# Patient Record
Sex: FEMALE | Race: WHITE | NOT HISPANIC OR LATINO | URBAN - METROPOLITAN AREA
[De-identification: names, ages, dates, MRNs, and addresses within clinical notes are randomized per-mention and may not be internally consistent; named-entity substitution may affect disease eponyms.]

---

## 2017-09-13 ENCOUNTER — OFFICE (OUTPATIENT)
Dept: URBAN - METROPOLITAN AREA CLINIC 75 | Facility: CLINIC | Age: 62
End: 2017-09-13

## 2017-09-13 VITALS
HEIGHT: 72 IN | DIASTOLIC BLOOD PRESSURE: 70 MMHG | WEIGHT: 189 LBS | SYSTOLIC BLOOD PRESSURE: 136 MMHG | HEART RATE: 82 BPM

## 2017-09-13 DIAGNOSIS — K74.60 UNSPECIFIED CIRRHOSIS OF LIVER: ICD-10-CM

## 2017-09-13 PROCEDURE — 99244 OFF/OP CNSLTJ NEW/EST MOD 40: CPT | Performed by: INTERNAL MEDICINE

## 2017-10-30 VITALS
RESPIRATION RATE: 26 BRPM | TEMPERATURE: 97.7 F | HEART RATE: 74 BPM | DIASTOLIC BLOOD PRESSURE: 97 MMHG | HEIGHT: 72 IN | HEART RATE: 89 BPM | RESPIRATION RATE: 16 BRPM | OXYGEN SATURATION: 100 % | WEIGHT: 187 LBS | TEMPERATURE: 98.5 F | SYSTOLIC BLOOD PRESSURE: 137 MMHG | OXYGEN SATURATION: 93 % | SYSTOLIC BLOOD PRESSURE: 151 MMHG | HEART RATE: 68 BPM | HEART RATE: 77 BPM | DIASTOLIC BLOOD PRESSURE: 74 MMHG | HEART RATE: 80 BPM | OXYGEN SATURATION: 96 % | SYSTOLIC BLOOD PRESSURE: 131 MMHG | SYSTOLIC BLOOD PRESSURE: 140 MMHG | SYSTOLIC BLOOD PRESSURE: 133 MMHG | DIASTOLIC BLOOD PRESSURE: 78 MMHG | RESPIRATION RATE: 19 BRPM | OXYGEN SATURATION: 80 % | DIASTOLIC BLOOD PRESSURE: 76 MMHG | DIASTOLIC BLOOD PRESSURE: 75 MMHG | HEART RATE: 69 BPM

## 2017-10-31 ENCOUNTER — OFFICE (OUTPATIENT)
Dept: URBAN - METROPOLITAN AREA CLINIC 64 | Facility: CLINIC | Age: 62
End: 2017-10-31

## 2017-10-31 ENCOUNTER — AMBULATORY SURGICAL CENTER (OUTPATIENT)
Dept: URBAN - METROPOLITAN AREA SURGERY 17 | Facility: SURGERY | Age: 62
End: 2017-10-31

## 2017-10-31 DIAGNOSIS — K29.70 GASTRITIS, UNSPECIFIED, WITHOUT BLEEDING: ICD-10-CM

## 2017-10-31 DIAGNOSIS — K74.60 UNSPECIFIED CIRRHOSIS OF LIVER: ICD-10-CM

## 2017-10-31 DIAGNOSIS — I85.00 ESOPHAGEAL VARICES WITHOUT BLEEDING: ICD-10-CM

## 2017-10-31 DIAGNOSIS — K29.50 UNSPECIFIED CHRONIC GASTRITIS WITHOUT BLEEDING: ICD-10-CM

## 2017-10-31 LAB
GI HISTOLOGY: A. UNSPECIFIED: (no result)
GI HISTOLOGY: PDF REPORT: (no result)

## 2017-10-31 PROCEDURE — 88305 TISSUE EXAM BY PATHOLOGIST: CPT | Performed by: INTERNAL MEDICINE

## 2017-10-31 PROCEDURE — 43239 EGD BIOPSY SINGLE/MULTIPLE: CPT | Performed by: INTERNAL MEDICINE

## 2017-10-31 RX ADMIN — PROPOFOL 50 MG: 10 INJECTION, EMULSION INTRAVENOUS at 14:14

## 2017-10-31 RX ADMIN — LIDOCAINE HYDROCHLORIDE 50 MG: 10 INJECTION, SOLUTION EPIDURAL; INFILTRATION; INTRACAUDAL; PERINEURAL at 14:10

## 2017-10-31 RX ADMIN — PROPOFOL 100 MG: 10 INJECTION, EMULSION INTRAVENOUS at 14:10

## 2017-10-31 RX ADMIN — PROPOFOL 50 MG: 10 INJECTION, EMULSION INTRAVENOUS at 14:12

## 2017-12-21 ENCOUNTER — OFFICE (OUTPATIENT)
Dept: URBAN - METROPOLITAN AREA CLINIC 75 | Facility: CLINIC | Age: 62
End: 2017-12-21

## 2017-12-21 VITALS
SYSTOLIC BLOOD PRESSURE: 130 MMHG | HEIGHT: 72 IN | DIASTOLIC BLOOD PRESSURE: 72 MMHG | WEIGHT: 192 LBS | HEART RATE: 95 BPM

## 2017-12-21 DIAGNOSIS — Z12.11 ENCOUNTER FOR SCREENING FOR MALIGNANT NEOPLASM OF COLON: ICD-10-CM

## 2017-12-21 DIAGNOSIS — K29.70 GASTRITIS, UNSPECIFIED, WITHOUT BLEEDING: ICD-10-CM

## 2017-12-21 DIAGNOSIS — K74.60 UNSPECIFIED CIRRHOSIS OF LIVER: ICD-10-CM

## 2017-12-21 DIAGNOSIS — I85.00 ESOPHAGEAL VARICES WITHOUT BLEEDING: ICD-10-CM

## 2017-12-21 PROCEDURE — 99214 OFFICE O/P EST MOD 30 MIN: CPT | Performed by: INTERNAL MEDICINE

## 2018-04-02 VITALS
HEART RATE: 67 BPM | RESPIRATION RATE: 16 BRPM | TEMPERATURE: 97.4 F | DIASTOLIC BLOOD PRESSURE: 88 MMHG | RESPIRATION RATE: 15 BRPM | DIASTOLIC BLOOD PRESSURE: 61 MMHG | OXYGEN SATURATION: 97 % | OXYGEN SATURATION: 98 % | DIASTOLIC BLOOD PRESSURE: 64 MMHG | HEART RATE: 74 BPM | RESPIRATION RATE: 29 BRPM | OXYGEN SATURATION: 95 % | OXYGEN SATURATION: 94 % | HEART RATE: 77 BPM | DIASTOLIC BLOOD PRESSURE: 86 MMHG | DIASTOLIC BLOOD PRESSURE: 68 MMHG | SYSTOLIC BLOOD PRESSURE: 134 MMHG | SYSTOLIC BLOOD PRESSURE: 139 MMHG | HEART RATE: 73 BPM | SYSTOLIC BLOOD PRESSURE: 124 MMHG | SYSTOLIC BLOOD PRESSURE: 142 MMHG | RESPIRATION RATE: 18 BRPM | SYSTOLIC BLOOD PRESSURE: 126 MMHG | DIASTOLIC BLOOD PRESSURE: 76 MMHG | SYSTOLIC BLOOD PRESSURE: 97 MMHG | HEART RATE: 78 BPM | TEMPERATURE: 97.8 F | RESPIRATION RATE: 22 BRPM | RESPIRATION RATE: 19 BRPM | DIASTOLIC BLOOD PRESSURE: 70 MMHG | RESPIRATION RATE: 17 BRPM | SYSTOLIC BLOOD PRESSURE: 140 MMHG | HEART RATE: 75 BPM | OXYGEN SATURATION: 93 % | DIASTOLIC BLOOD PRESSURE: 72 MMHG | RESPIRATION RATE: 34 BRPM | RESPIRATION RATE: 30 BRPM | HEIGHT: 72 IN | SYSTOLIC BLOOD PRESSURE: 137 MMHG | HEART RATE: 69 BPM | WEIGHT: 192 LBS | DIASTOLIC BLOOD PRESSURE: 67 MMHG | HEART RATE: 70 BPM

## 2018-04-03 ENCOUNTER — AMBULATORY SURGICAL CENTER (OUTPATIENT)
Dept: URBAN - METROPOLITAN AREA SURGERY 17 | Facility: SURGERY | Age: 63
End: 2018-04-03

## 2018-04-03 DIAGNOSIS — Z12.11 ENCOUNTER FOR SCREENING FOR MALIGNANT NEOPLASM OF COLON: ICD-10-CM

## 2018-04-03 DIAGNOSIS — K57.30 DIVERTICULOSIS OF LARGE INTESTINE WITHOUT PERFORATION OR ABS: ICD-10-CM

## 2018-04-03 DIAGNOSIS — K64.8 OTHER HEMORRHOIDS: ICD-10-CM

## 2018-04-03 PROCEDURE — 45378 DIAGNOSTIC COLONOSCOPY: CPT | Mod: 33 | Performed by: INTERNAL MEDICINE

## 2018-04-03 RX ADMIN — PROPOFOL 100 MG: 10 INJECTION, EMULSION INTRAVENOUS at 07:54

## 2018-04-03 RX ADMIN — PROPOFOL 25 MG: 10 INJECTION, EMULSION INTRAVENOUS at 07:58

## 2018-04-03 RX ADMIN — PROPOFOL 25 MG: 10 INJECTION, EMULSION INTRAVENOUS at 08:09

## 2018-04-03 RX ADMIN — LIDOCAINE HYDROCHLORIDE 25 MG: 10 INJECTION, SOLUTION EPIDURAL; INFILTRATION; INTRACAUDAL; PERINEURAL at 07:54

## 2018-04-03 RX ADMIN — PROPOFOL 25 MG: 10 INJECTION, EMULSION INTRAVENOUS at 08:05

## 2018-04-03 RX ADMIN — PROPOFOL 25 MG: 10 INJECTION, EMULSION INTRAVENOUS at 08:01

## 2018-04-03 RX ADMIN — PROPOFOL 25 MG: 10 INJECTION, EMULSION INTRAVENOUS at 08:03

## 2018-10-05 ENCOUNTER — OFFICE (OUTPATIENT)
Dept: URBAN - METROPOLITAN AREA CLINIC 75 | Facility: CLINIC | Age: 63
End: 2018-10-05

## 2018-10-05 VITALS
HEART RATE: 68 BPM | HEIGHT: 71 IN | DIASTOLIC BLOOD PRESSURE: 68 MMHG | SYSTOLIC BLOOD PRESSURE: 128 MMHG | WEIGHT: 188 LBS

## 2018-10-05 DIAGNOSIS — K74.60 UNSPECIFIED CIRRHOSIS OF LIVER: ICD-10-CM

## 2018-10-05 DIAGNOSIS — I85.00 ESOPHAGEAL VARICES WITHOUT BLEEDING: ICD-10-CM

## 2018-10-05 DIAGNOSIS — Z80.0 FAMILY HISTORY OF MALIGNANT NEOPLASM OF DIGESTIVE ORGANS: ICD-10-CM

## 2018-10-05 DIAGNOSIS — K58.9 IRRITABLE BOWEL SYNDROME WITHOUT DIARRHEA: ICD-10-CM

## 2018-10-05 PROCEDURE — 99214 OFFICE O/P EST MOD 30 MIN: CPT | Performed by: INTERNAL MEDICINE

## 2019-10-04 ENCOUNTER — OFFICE (OUTPATIENT)
Dept: URBAN - METROPOLITAN AREA CLINIC 75 | Facility: CLINIC | Age: 64
End: 2019-10-04

## 2019-10-04 VITALS
WEIGHT: 188 LBS | DIASTOLIC BLOOD PRESSURE: 82 MMHG | HEART RATE: 69 BPM | SYSTOLIC BLOOD PRESSURE: 122 MMHG | HEIGHT: 71 IN

## 2019-10-04 DIAGNOSIS — R15.2 FECAL URGENCY: ICD-10-CM

## 2019-10-04 DIAGNOSIS — K76.9 LIVER DISEASE, UNSPECIFIED: ICD-10-CM

## 2019-10-04 DIAGNOSIS — I85.00 ESOPHAGEAL VARICES WITHOUT BLEEDING: ICD-10-CM

## 2019-10-04 DIAGNOSIS — Q44.7 OTHER CONGENITAL MALFORMATIONS OF LIVER: ICD-10-CM

## 2019-10-04 PROCEDURE — 99214 OFFICE O/P EST MOD 30 MIN: CPT | Performed by: INTERNAL MEDICINE

## 2020-01-03 VITALS
DIASTOLIC BLOOD PRESSURE: 75 MMHG | HEART RATE: 77 BPM | SYSTOLIC BLOOD PRESSURE: 129 MMHG | HEART RATE: 74 BPM | HEART RATE: 64 BPM | HEART RATE: 68 BPM | SYSTOLIC BLOOD PRESSURE: 127 MMHG | OXYGEN SATURATION: 95 % | HEART RATE: 65 BPM | HEIGHT: 71 IN | HEART RATE: 83 BPM | DIASTOLIC BLOOD PRESSURE: 78 MMHG | SYSTOLIC BLOOD PRESSURE: 131 MMHG | RESPIRATION RATE: 20 BRPM | SYSTOLIC BLOOD PRESSURE: 166 MMHG | DIASTOLIC BLOOD PRESSURE: 80 MMHG | TEMPERATURE: 97.2 F | DIASTOLIC BLOOD PRESSURE: 82 MMHG | RESPIRATION RATE: 26 BRPM | OXYGEN SATURATION: 96 % | HEART RATE: 69 BPM | DIASTOLIC BLOOD PRESSURE: 77 MMHG | TEMPERATURE: 98 F | RESPIRATION RATE: 24 BRPM | SYSTOLIC BLOOD PRESSURE: 139 MMHG | DIASTOLIC BLOOD PRESSURE: 79 MMHG | OXYGEN SATURATION: 91 % | WEIGHT: 188 LBS | SYSTOLIC BLOOD PRESSURE: 134 MMHG | RESPIRATION RATE: 18 BRPM

## 2020-01-07 ENCOUNTER — OFFICE (OUTPATIENT)
Dept: URBAN - METROPOLITAN AREA PATHOLOGY 4 | Facility: PATHOLOGY | Age: 65
End: 2020-01-07

## 2020-01-07 ENCOUNTER — AMBULATORY SURGICAL CENTER (OUTPATIENT)
Dept: URBAN - METROPOLITAN AREA SURGERY 17 | Facility: SURGERY | Age: 65
End: 2020-01-07

## 2020-01-07 DIAGNOSIS — K74.60 UNSPECIFIED CIRRHOSIS OF LIVER: ICD-10-CM

## 2020-01-07 DIAGNOSIS — K31.89 OTHER DISEASES OF STOMACH AND DUODENUM: ICD-10-CM

## 2020-01-07 DIAGNOSIS — K29.70 GASTRITIS, UNSPECIFIED, WITHOUT BLEEDING: ICD-10-CM

## 2020-01-07 DIAGNOSIS — I85.00 ESOPHAGEAL VARICES WITHOUT BLEEDING: ICD-10-CM

## 2020-01-07 LAB
GI HISTOLOGY: A. UNSPECIFIED: (no result)
GI HISTOLOGY: PDF REPORT: (no result)

## 2020-01-07 PROCEDURE — 43239 EGD BIOPSY SINGLE/MULTIPLE: CPT | Performed by: INTERNAL MEDICINE

## 2020-01-07 PROCEDURE — 88305 TISSUE EXAM BY PATHOLOGIST: CPT | Performed by: INTERNAL MEDICINE

## 2020-07-27 ENCOUNTER — OFFICE (OUTPATIENT)
Dept: URBAN - METROPOLITAN AREA CLINIC 75 | Facility: CLINIC | Age: 65
End: 2020-07-27

## 2020-07-27 VITALS — TEMPERATURE: 97.8 F | HEIGHT: 71 IN | WEIGHT: 190 LBS

## 2020-07-27 DIAGNOSIS — I85.00 ESOPHAGEAL VARICES WITHOUT BLEEDING: ICD-10-CM

## 2020-07-27 DIAGNOSIS — K74.60 UNSPECIFIED CIRRHOSIS OF LIVER: ICD-10-CM

## 2020-07-27 PROCEDURE — 99214 OFFICE O/P EST MOD 30 MIN: CPT | Performed by: INTERNAL MEDICINE

## 2021-07-28 ENCOUNTER — INPATIENT HOSPITAL (OUTPATIENT)
Dept: URBAN - METROPOLITAN AREA HOSPITAL 107 | Facility: HOSPITAL | Age: 66
End: 2021-07-28

## 2021-07-28 DIAGNOSIS — D50.9 IRON DEFICIENCY ANEMIA, UNSPECIFIED: ICD-10-CM

## 2021-07-28 DIAGNOSIS — I85.00 ESOPHAGEAL VARICES WITHOUT BLEEDING: ICD-10-CM

## 2021-07-28 DIAGNOSIS — K74.60 UNSPECIFIED CIRRHOSIS OF LIVER: ICD-10-CM

## 2021-07-28 DIAGNOSIS — R04.2 HEMOPTYSIS: ICD-10-CM

## 2021-07-28 PROCEDURE — 99254 IP/OBS CNSLTJ NEW/EST MOD 60: CPT | Performed by: NURSE PRACTITIONER

## 2021-07-29 ENCOUNTER — INPATIENT HOSPITAL (OUTPATIENT)
Dept: URBAN - METROPOLITAN AREA HOSPITAL 107 | Facility: HOSPITAL | Age: 66
End: 2021-07-29

## 2021-07-29 DIAGNOSIS — I85.00 ESOPHAGEAL VARICES WITHOUT BLEEDING: ICD-10-CM

## 2021-07-29 DIAGNOSIS — K22.2 ESOPHAGEAL OBSTRUCTION: ICD-10-CM

## 2021-07-29 DIAGNOSIS — D50.9 IRON DEFICIENCY ANEMIA, UNSPECIFIED: ICD-10-CM

## 2021-07-29 DIAGNOSIS — K74.60 UNSPECIFIED CIRRHOSIS OF LIVER: ICD-10-CM

## 2021-07-29 DIAGNOSIS — R04.2 HEMOPTYSIS: ICD-10-CM

## 2021-07-29 PROCEDURE — 43235 EGD DIAGNOSTIC BRUSH WASH: CPT | Performed by: INTERNAL MEDICINE

## 2021-10-22 ENCOUNTER — OFFICE (OUTPATIENT)
Dept: URBAN - METROPOLITAN AREA CLINIC 75 | Facility: CLINIC | Age: 66
End: 2021-10-22

## 2021-10-22 VITALS
OXYGEN SATURATION: 97 % | WEIGHT: 184 LBS | HEIGHT: 71 IN | DIASTOLIC BLOOD PRESSURE: 78 MMHG | SYSTOLIC BLOOD PRESSURE: 122 MMHG | HEART RATE: 77 BPM

## 2021-10-22 DIAGNOSIS — K74.69 OTHER CIRRHOSIS OF LIVER: ICD-10-CM

## 2021-10-22 PROCEDURE — 99214 OFFICE O/P EST MOD 30 MIN: CPT | Performed by: NURSE PRACTITIONER

## 2022-12-26 ENCOUNTER — APPOINTMENT (OUTPATIENT)
Dept: GENERAL RADIOLOGY | Facility: HOSPITAL | Age: 67
DRG: 177 | End: 2022-12-26
Payer: COMMERCIAL

## 2022-12-26 ENCOUNTER — HOSPITAL ENCOUNTER (INPATIENT)
Facility: HOSPITAL | Age: 67
LOS: 5 days | Discharge: HOME | DRG: 177 | End: 2022-12-31
Attending: EMERGENCY MEDICINE | Admitting: STUDENT IN AN ORGANIZED HEALTH CARE EDUCATION/TRAINING PROGRAM
Payer: COMMERCIAL

## 2022-12-26 ENCOUNTER — APPOINTMENT (OUTPATIENT)
Dept: ULTRASOUND IMAGING | Facility: HOSPITAL | Age: 67
DRG: 177 | End: 2022-12-26
Payer: COMMERCIAL

## 2022-12-26 ENCOUNTER — APPOINTMENT (OUTPATIENT)
Dept: CT IMAGING | Facility: HOSPITAL | Age: 67
DRG: 177 | End: 2022-12-26
Payer: COMMERCIAL

## 2022-12-26 DIAGNOSIS — K29.80 DUODENITIS: ICD-10-CM

## 2022-12-26 DIAGNOSIS — Z94.0 HISTORY OF RENAL TRANSPLANT: ICD-10-CM

## 2022-12-26 DIAGNOSIS — R50.9 FEBRILE ILLNESS: ICD-10-CM

## 2022-12-26 DIAGNOSIS — J18.9 MULTIFOCAL PNEUMONIA: Primary | ICD-10-CM

## 2022-12-26 LAB
ALBUMIN SERPL-MCNC: 2.9 G/DL (ref 3.5–5.2)
ALBUMIN/GLOB SERPL: 0.7 G/DL
ALP SERPL-CCNC: 89 U/L (ref 39–117)
ALT SERPL W P-5'-P-CCNC: 29 U/L (ref 1–33)
ANION GAP SERPL CALCULATED.3IONS-SCNC: 7.3 MMOL/L (ref 5–15)
AST SERPL-CCNC: 37 U/L (ref 1–32)
BACTERIA UR QL AUTO: ABNORMAL /HPF
BASOPHILS # BLD AUTO: 0.01 10*3/MM3 (ref 0–0.2)
BASOPHILS NFR BLD AUTO: 0.1 % (ref 0–1.5)
BILIRUB SERPL-MCNC: 0.7 MG/DL (ref 0–1.2)
BILIRUB UR QL STRIP: NEGATIVE
BUN SERPL-MCNC: 19 MG/DL (ref 8–23)
BUN/CREAT SERPL: 20.9 (ref 7–25)
CALCIUM SPEC-SCNC: 9.3 MG/DL (ref 8.6–10.5)
CHLORIDE SERPL-SCNC: 107 MMOL/L (ref 98–107)
CLARITY UR: CLEAR
CO2 SERPL-SCNC: 23.7 MMOL/L (ref 22–29)
COLOR UR: YELLOW
CREAT SERPL-MCNC: 0.91 MG/DL (ref 0.57–1)
D-LACTATE SERPL-SCNC: 1 MMOL/L (ref 0.5–2)
DEPRECATED RDW RBC AUTO: 45.5 FL (ref 37–54)
EGFRCR SERPLBLD CKD-EPI 2021: 68.4 ML/MIN/1.73
EOSINOPHIL # BLD AUTO: 0 10*3/MM3 (ref 0–0.4)
EOSINOPHIL NFR BLD AUTO: 0 % (ref 0.3–6.2)
ERYTHROCYTE [DISTWIDTH] IN BLOOD BY AUTOMATED COUNT: 15.1 % (ref 12.3–15.4)
GLOBULIN UR ELPH-MCNC: 4 GM/DL
GLUCOSE SERPL-MCNC: 137 MG/DL (ref 65–99)
GLUCOSE UR STRIP-MCNC: NEGATIVE MG/DL
HCT VFR BLD AUTO: 33.4 % (ref 34–46.6)
HGB BLD-MCNC: 10.6 G/DL (ref 12–15.9)
HGB UR QL STRIP.AUTO: NEGATIVE
HYALINE CASTS UR QL AUTO: ABNORMAL /LPF
IMM GRANULOCYTES # BLD AUTO: 0.02 10*3/MM3 (ref 0–0.05)
IMM GRANULOCYTES NFR BLD AUTO: 0.3 % (ref 0–0.5)
KETONES UR QL STRIP: NEGATIVE
LEUKOCYTE ESTERASE UR QL STRIP.AUTO: ABNORMAL
LIPASE SERPL-CCNC: 36 U/L (ref 13–60)
LYMPHOCYTES # BLD AUTO: 0.47 10*3/MM3 (ref 0.7–3.1)
LYMPHOCYTES NFR BLD AUTO: 6.7 % (ref 19.6–45.3)
MCH RBC QN AUTO: 26.6 PG (ref 26.6–33)
MCHC RBC AUTO-ENTMCNC: 31.7 G/DL (ref 31.5–35.7)
MCV RBC AUTO: 83.7 FL (ref 79–97)
MONOCYTES # BLD AUTO: 0.69 10*3/MM3 (ref 0.1–0.9)
MONOCYTES NFR BLD AUTO: 9.8 % (ref 5–12)
NEUTROPHILS NFR BLD AUTO: 5.84 10*3/MM3 (ref 1.7–7)
NEUTROPHILS NFR BLD AUTO: 83.1 % (ref 42.7–76)
NITRITE UR QL STRIP: POSITIVE
NRBC BLD AUTO-RTO: 0 /100 WBC (ref 0–0.2)
PH UR STRIP.AUTO: <=5 [PH] (ref 5–8)
PLATELET # BLD AUTO: 145 10*3/MM3 (ref 140–450)
PMV BLD AUTO: 10.4 FL (ref 6–12)
POTASSIUM SERPL-SCNC: 3.9 MMOL/L (ref 3.5–5.2)
PROCALCITONIN SERPL-MCNC: 0.26 NG/ML (ref 0–0.25)
PROT SERPL-MCNC: 6.9 G/DL (ref 6–8.5)
PROT UR QL STRIP: ABNORMAL
RBC # BLD AUTO: 3.99 10*6/MM3 (ref 3.77–5.28)
RBC # UR STRIP: ABNORMAL /HPF
REF LAB TEST METHOD: ABNORMAL
SODIUM SERPL-SCNC: 138 MMOL/L (ref 136–145)
SP GR UR STRIP: >=1.03 (ref 1–1.03)
SQUAMOUS #/AREA URNS HPF: ABNORMAL /HPF
UROBILINOGEN UR QL STRIP: ABNORMAL
WBC # UR STRIP: ABNORMAL /HPF
WBC NRBC COR # BLD: 7.03 10*3/MM3 (ref 3.4–10.8)

## 2022-12-26 PROCEDURE — 25010000002 HYDROMORPHONE 1 MG/ML SOLUTION: Performed by: EMERGENCY MEDICINE

## 2022-12-26 PROCEDURE — 71045 X-RAY EXAM CHEST 1 VIEW: CPT

## 2022-12-26 PROCEDURE — 25010000002 ONDANSETRON PER 1 MG: Performed by: EMERGENCY MEDICINE

## 2022-12-26 PROCEDURE — 25010000002 IOPAMIDOL 61 % SOLUTION: Performed by: EMERGENCY MEDICINE

## 2022-12-26 PROCEDURE — 87086 URINE CULTURE/COLONY COUNT: CPT | Performed by: EMERGENCY MEDICINE

## 2022-12-26 PROCEDURE — 74177 CT ABD & PELVIS W/CONTRAST: CPT

## 2022-12-26 PROCEDURE — 25010000002 CEFTRIAXONE PER 250 MG: Performed by: EMERGENCY MEDICINE

## 2022-12-26 PROCEDURE — 25010000002 AZITHROMYCIN PER 500 MG: Performed by: EMERGENCY MEDICINE

## 2022-12-26 PROCEDURE — 83690 ASSAY OF LIPASE: CPT | Performed by: EMERGENCY MEDICINE

## 2022-12-26 PROCEDURE — 83605 ASSAY OF LACTIC ACID: CPT | Performed by: EMERGENCY MEDICINE

## 2022-12-26 PROCEDURE — 87040 BLOOD CULTURE FOR BACTERIA: CPT | Performed by: EMERGENCY MEDICINE

## 2022-12-26 PROCEDURE — 80053 COMPREHEN METABOLIC PANEL: CPT | Performed by: EMERGENCY MEDICINE

## 2022-12-26 PROCEDURE — 99285 EMERGENCY DEPT VISIT HI MDM: CPT

## 2022-12-26 PROCEDURE — 76705 ECHO EXAM OF ABDOMEN: CPT

## 2022-12-26 PROCEDURE — 3E03329 INTRODUCTION OF OTHER ANTI-INFECTIVE INTO PERIPHERAL VEIN, PERCUTANEOUS APPROACH: ICD-10-PCS | Performed by: STUDENT IN AN ORGANIZED HEALTH CARE EDUCATION/TRAINING PROGRAM

## 2022-12-26 PROCEDURE — 85025 COMPLETE CBC W/AUTO DIFF WBC: CPT | Performed by: EMERGENCY MEDICINE

## 2022-12-26 PROCEDURE — 25010000002 PIPERACILLIN SOD-TAZOBACTAM PER 1 G: Performed by: STUDENT IN AN ORGANIZED HEALTH CARE EDUCATION/TRAINING PROGRAM

## 2022-12-26 PROCEDURE — 36415 COLL VENOUS BLD VENIPUNCTURE: CPT

## 2022-12-26 PROCEDURE — XW033E5 INTRODUCTION OF REMDESIVIR ANTI-INFECTIVE INTO PERIPHERAL VEIN, PERCUTANEOUS APPROACH, NEW TECHNOLOGY GROUP 5: ICD-10-PCS | Performed by: STUDENT IN AN ORGANIZED HEALTH CARE EDUCATION/TRAINING PROGRAM

## 2022-12-26 PROCEDURE — 3E0DX3Z INTRODUCTION OF ANTI-INFLAMMATORY INTO MOUTH AND PHARYNX, EXTERNAL APPROACH: ICD-10-PCS | Performed by: STUDENT IN AN ORGANIZED HEALTH CARE EDUCATION/TRAINING PROGRAM

## 2022-12-26 PROCEDURE — 81001 URINALYSIS AUTO W/SCOPE: CPT | Performed by: EMERGENCY MEDICINE

## 2022-12-26 PROCEDURE — 84145 PROCALCITONIN (PCT): CPT | Performed by: EMERGENCY MEDICINE

## 2022-12-26 RX ORDER — MYCOPHENOLATE MOFETIL 250 MG/1
250 CAPSULE ORAL EVERY MORNING
COMMUNITY

## 2022-12-26 RX ORDER — SODIUM CHLORIDE 0.9 % (FLUSH) 0.9 %
10 SYRINGE (ML) INJECTION AS NEEDED
Status: DISCONTINUED | OUTPATIENT
Start: 2022-12-26 | End: 2022-12-31 | Stop reason: HOSPADM

## 2022-12-26 RX ORDER — HYDROMORPHONE HYDROCHLORIDE 1 MG/ML
0.5 INJECTION, SOLUTION INTRAMUSCULAR; INTRAVENOUS; SUBCUTANEOUS ONCE
Status: COMPLETED | OUTPATIENT
Start: 2022-12-26 | End: 2022-12-26

## 2022-12-26 RX ORDER — SODIUM CHLORIDE 9 MG/ML
40 INJECTION, SOLUTION INTRAVENOUS AS NEEDED
Status: DISCONTINUED | OUTPATIENT
Start: 2022-12-26 | End: 2022-12-31 | Stop reason: HOSPADM

## 2022-12-26 RX ORDER — ONDANSETRON 4 MG/1
4 TABLET, FILM COATED ORAL EVERY 6 HOURS PRN
Status: DISCONTINUED | OUTPATIENT
Start: 2022-12-26 | End: 2022-12-31 | Stop reason: HOSPADM

## 2022-12-26 RX ORDER — NADOLOL 20 MG/1
20 TABLET ORAL DAILY
COMMUNITY

## 2022-12-26 RX ORDER — MULTIPLE VITAMINS W/ MINERALS TAB 9MG-400MCG
1 TAB ORAL DAILY
COMMUNITY

## 2022-12-26 RX ORDER — ACETAMINOPHEN 325 MG/1
650 TABLET ORAL EVERY 4 HOURS PRN
Status: DISCONTINUED | OUTPATIENT
Start: 2022-12-26 | End: 2022-12-27

## 2022-12-26 RX ORDER — SODIUM CHLORIDE 0.9 % (FLUSH) 0.9 %
10 SYRINGE (ML) INJECTION EVERY 12 HOURS SCHEDULED
Status: DISCONTINUED | OUTPATIENT
Start: 2022-12-26 | End: 2022-12-31 | Stop reason: HOSPADM

## 2022-12-26 RX ORDER — ONDANSETRON 2 MG/ML
4 INJECTION INTRAMUSCULAR; INTRAVENOUS ONCE
Status: COMPLETED | OUTPATIENT
Start: 2022-12-26 | End: 2022-12-26

## 2022-12-26 RX ORDER — ACETAMINOPHEN 160 MG/5ML
650 SOLUTION ORAL EVERY 4 HOURS PRN
Status: DISCONTINUED | OUTPATIENT
Start: 2022-12-26 | End: 2022-12-27

## 2022-12-26 RX ORDER — ACETAMINOPHEN 325 MG/1
650 TABLET ORAL ONCE
Status: COMPLETED | OUTPATIENT
Start: 2022-12-26 | End: 2022-12-26

## 2022-12-26 RX ORDER — ACETAMINOPHEN 650 MG/1
650 SUPPOSITORY RECTAL EVERY 4 HOURS PRN
Status: DISCONTINUED | OUTPATIENT
Start: 2022-12-26 | End: 2022-12-27

## 2022-12-26 RX ORDER — TACROLIMUS 1 MG/1
1 CAPSULE ORAL 2 TIMES DAILY
COMMUNITY

## 2022-12-26 RX ORDER — ACETAMINOPHEN 500 MG
1000 TABLET ORAL ONCE
Status: DISCONTINUED | OUTPATIENT
Start: 2022-12-26 | End: 2022-12-26

## 2022-12-26 RX ORDER — CHOLECALCIFEROL (VITAMIN D3) 125 MCG
5 CAPSULE ORAL NIGHTLY PRN
Status: DISCONTINUED | OUTPATIENT
Start: 2022-12-26 | End: 2022-12-31 | Stop reason: HOSPADM

## 2022-12-26 RX ORDER — ONDANSETRON 2 MG/ML
4 INJECTION INTRAMUSCULAR; INTRAVENOUS EVERY 6 HOURS PRN
Status: DISCONTINUED | OUTPATIENT
Start: 2022-12-26 | End: 2022-12-31 | Stop reason: HOSPADM

## 2022-12-26 RX ADMIN — TAZOBACTAM SODIUM AND PIPERACILLIN SODIUM 3.38 G: 375; 3 INJECTION, SOLUTION INTRAVENOUS at 22:22

## 2022-12-26 RX ADMIN — CEFTRIAXONE SODIUM 1 G: 1 INJECTION, POWDER, FOR SOLUTION INTRAMUSCULAR; INTRAVENOUS at 20:02

## 2022-12-26 RX ADMIN — HYDROMORPHONE HYDROCHLORIDE 0.5 MG: 1 INJECTION, SOLUTION INTRAMUSCULAR; INTRAVENOUS; SUBCUTANEOUS at 18:06

## 2022-12-26 RX ADMIN — AZITHROMYCIN MONOHYDRATE 500 MG: 500 INJECTION, POWDER, LYOPHILIZED, FOR SOLUTION INTRAVENOUS at 20:40

## 2022-12-26 RX ADMIN — SODIUM CHLORIDE, POTASSIUM CHLORIDE, SODIUM LACTATE AND CALCIUM CHLORIDE 1000 ML: 600; 310; 30; 20 INJECTION, SOLUTION INTRAVENOUS at 18:04

## 2022-12-26 RX ADMIN — IOPAMIDOL 85 ML: 612 INJECTION, SOLUTION INTRAVENOUS at 19:31

## 2022-12-26 RX ADMIN — ONDANSETRON 4 MG: 2 INJECTION INTRAMUSCULAR; INTRAVENOUS at 18:04

## 2022-12-26 RX ADMIN — ACETAMINOPHEN 650 MG: 325 TABLET, FILM COATED ORAL at 18:04

## 2022-12-26 NOTE — ED NOTES
Pt arrives with assist to triage for right upper quadrant pain under her breast that started around 1530 today. Pt denies any injury. Pt has history of kidney transplant on that side

## 2022-12-27 PROBLEM — J12.82 PNEUMONIA DUE TO COVID-19 VIRUS: Status: ACTIVE | Noted: 2022-12-27

## 2022-12-27 PROBLEM — R10.11 RIGHT UPPER QUADRANT ABDOMINAL PAIN: Status: ACTIVE | Noted: 2022-12-27

## 2022-12-27 PROBLEM — U07.1 PNEUMONIA DUE TO COVID-19 VIRUS: Status: ACTIVE | Noted: 2022-12-27

## 2022-12-27 PROBLEM — I12.9 BENIGN HYPERTENSIVE KIDNEY DISEASE WITH CHRONIC KIDNEY DISEASE: Status: ACTIVE | Noted: 2020-10-06

## 2022-12-27 PROBLEM — I10 BENIGN HYPERTENSION: Status: ACTIVE | Noted: 2021-12-13

## 2022-12-27 PROBLEM — K81.9 ACALCULOUS CHOLECYSTITIS: Status: ACTIVE | Noted: 2022-12-27

## 2022-12-27 PROBLEM — N30.00 ACUTE CYSTITIS WITHOUT HEMATURIA: Status: ACTIVE | Noted: 2022-12-27

## 2022-12-27 PROBLEM — K29.80 DUODENITIS: Status: ACTIVE | Noted: 2022-12-27

## 2022-12-27 PROBLEM — K74.60 CIRRHOSIS OF LIVER: Status: ACTIVE | Noted: 2022-12-27

## 2022-12-27 LAB
ALBUMIN SERPL-MCNC: 2.5 G/DL (ref 3.5–5.2)
ALP SERPL-CCNC: 76 U/L (ref 39–117)
ALT SERPL W P-5'-P-CCNC: 24 U/L (ref 1–33)
ANION GAP SERPL CALCULATED.3IONS-SCNC: 6.4 MMOL/L (ref 5–15)
AST SERPL-CCNC: 26 U/L (ref 1–32)
B PARAPERT DNA SPEC QL NAA+PROBE: NOT DETECTED
B PERT DNA SPEC QL NAA+PROBE: NOT DETECTED
BASOPHILS # BLD AUTO: 0.01 10*3/MM3 (ref 0–0.2)
BASOPHILS NFR BLD AUTO: 0.3 % (ref 0–1.5)
BILIRUB CONJ SERPL-MCNC: <0.2 MG/DL (ref 0–0.3)
BILIRUB INDIRECT SERPL-MCNC: ABNORMAL MG/DL
BILIRUB SERPL-MCNC: 0.4 MG/DL (ref 0–1.2)
BUN SERPL-MCNC: 17 MG/DL (ref 8–23)
BUN/CREAT SERPL: 20 (ref 7–25)
C PNEUM DNA NPH QL NAA+NON-PROBE: NOT DETECTED
CALCIUM SPEC-SCNC: 8.9 MG/DL (ref 8.6–10.5)
CHLORIDE SERPL-SCNC: 107 MMOL/L (ref 98–107)
CO2 SERPL-SCNC: 24.6 MMOL/L (ref 22–29)
CREAT SERPL-MCNC: 0.85 MG/DL (ref 0.57–1)
CREAT SERPL-MCNC: 0.9 MG/DL (ref 0.57–1)
DEPRECATED RDW RBC AUTO: 47.3 FL (ref 37–54)
EGFRCR SERPLBLD CKD-EPI 2021: 69.3 ML/MIN/1.73
EGFRCR SERPLBLD CKD-EPI 2021: 74.3 ML/MIN/1.73
EOSINOPHIL # BLD AUTO: 0.02 10*3/MM3 (ref 0–0.4)
EOSINOPHIL NFR BLD AUTO: 0.5 % (ref 0.3–6.2)
ERYTHROCYTE [DISTWIDTH] IN BLOOD BY AUTOMATED COUNT: 15.3 % (ref 12.3–15.4)
FLUAV SUBTYP SPEC NAA+PROBE: NOT DETECTED
FLUBV RNA ISLT QL NAA+PROBE: NOT DETECTED
GLUCOSE SERPL-MCNC: 124 MG/DL (ref 65–99)
HADV DNA SPEC NAA+PROBE: NOT DETECTED
HAV IGM SERPL QL IA: NORMAL
HBV CORE IGM SERPL QL IA: NORMAL
HBV SURFACE AG SERPL QL IA: NORMAL
HCOV 229E RNA SPEC QL NAA+PROBE: NOT DETECTED
HCOV HKU1 RNA SPEC QL NAA+PROBE: NOT DETECTED
HCOV NL63 RNA SPEC QL NAA+PROBE: NOT DETECTED
HCOV OC43 RNA SPEC QL NAA+PROBE: NOT DETECTED
HCT VFR BLD AUTO: 30.4 % (ref 34–46.6)
HCV AB SER DONR QL: NORMAL
HGB BLD-MCNC: 9.7 G/DL (ref 12–15.9)
HMPV RNA NPH QL NAA+NON-PROBE: NOT DETECTED
HPIV1 RNA ISLT QL NAA+PROBE: NOT DETECTED
HPIV2 RNA SPEC QL NAA+PROBE: NOT DETECTED
HPIV3 RNA NPH QL NAA+PROBE: NOT DETECTED
HPIV4 P GENE NPH QL NAA+PROBE: NOT DETECTED
IMM GRANULOCYTES # BLD AUTO: 0.01 10*3/MM3 (ref 0–0.05)
IMM GRANULOCYTES NFR BLD AUTO: 0.3 % (ref 0–0.5)
IRON 24H UR-MRATE: 17 MCG/DL (ref 37–145)
IRON SATN MFR SERPL: 7 % (ref 20–50)
LYMPHOCYTES # BLD AUTO: 0.47 10*3/MM3 (ref 0.7–3.1)
LYMPHOCYTES NFR BLD AUTO: 12.4 % (ref 19.6–45.3)
M PNEUMO IGG SER IA-ACNC: NOT DETECTED
MAGNESIUM SERPL-MCNC: 1.8 MG/DL (ref 1.6–2.4)
MCH RBC QN AUTO: 27.1 PG (ref 26.6–33)
MCHC RBC AUTO-ENTMCNC: 31.9 G/DL (ref 31.5–35.7)
MCV RBC AUTO: 84.9 FL (ref 79–97)
MONOCYTES # BLD AUTO: 0.34 10*3/MM3 (ref 0.1–0.9)
MONOCYTES NFR BLD AUTO: 9 % (ref 5–12)
NEUTROPHILS NFR BLD AUTO: 2.93 10*3/MM3 (ref 1.7–7)
NEUTROPHILS NFR BLD AUTO: 77.5 % (ref 42.7–76)
NRBC BLD AUTO-RTO: 0 /100 WBC (ref 0–0.2)
PHOSPHATE SERPL-MCNC: 2.3 MG/DL (ref 2.5–4.5)
PLATELET # BLD AUTO: 105 10*3/MM3 (ref 140–450)
PMV BLD AUTO: 10.5 FL (ref 6–12)
POTASSIUM SERPL-SCNC: 4 MMOL/L (ref 3.5–5.2)
PROT SERPL-MCNC: 6.3 G/DL (ref 6–8.5)
RBC # BLD AUTO: 3.58 10*6/MM3 (ref 3.77–5.28)
RHINOVIRUS RNA SPEC NAA+PROBE: NOT DETECTED
RSV RNA NPH QL NAA+NON-PROBE: NOT DETECTED
SARS-COV-2 RNA NPH QL NAA+NON-PROBE: DETECTED
SODIUM SERPL-SCNC: 138 MMOL/L (ref 136–145)
TIBC SERPL-MCNC: 249 MCG/DL (ref 298–536)
TRANSFERRIN SERPL-MCNC: 167 MG/DL (ref 200–360)
WBC NRBC COR # BLD: 3.78 10*3/MM3 (ref 3.4–10.8)

## 2022-12-27 PROCEDURE — 63710000001 TACROLIMUS PER 1 MG: Performed by: STUDENT IN AN ORGANIZED HEALTH CARE EDUCATION/TRAINING PROGRAM

## 2022-12-27 PROCEDURE — 25010000002 ENOXAPARIN PER 10 MG: Performed by: STUDENT IN AN ORGANIZED HEALTH CARE EDUCATION/TRAINING PROGRAM

## 2022-12-27 PROCEDURE — 63710000001 ONDANSETRON PER 8 MG: Performed by: STUDENT IN AN ORGANIZED HEALTH CARE EDUCATION/TRAINING PROGRAM

## 2022-12-27 PROCEDURE — 80048 BASIC METABOLIC PNL TOTAL CA: CPT | Performed by: STUDENT IN AN ORGANIZED HEALTH CARE EDUCATION/TRAINING PROGRAM

## 2022-12-27 PROCEDURE — 84466 ASSAY OF TRANSFERRIN: CPT | Performed by: INTERNAL MEDICINE

## 2022-12-27 PROCEDURE — 25010000002 REMDESIVIR 100 MG/20ML SOLUTION 1 EACH VIAL: Performed by: STUDENT IN AN ORGANIZED HEALTH CARE EDUCATION/TRAINING PROGRAM

## 2022-12-27 PROCEDURE — 0202U NFCT DS 22 TRGT SARS-COV-2: CPT | Performed by: STUDENT IN AN ORGANIZED HEALTH CARE EDUCATION/TRAINING PROGRAM

## 2022-12-27 PROCEDURE — 83735 ASSAY OF MAGNESIUM: CPT | Performed by: STUDENT IN AN ORGANIZED HEALTH CARE EDUCATION/TRAINING PROGRAM

## 2022-12-27 PROCEDURE — 99222 1ST HOSP IP/OBS MODERATE 55: CPT | Performed by: INTERNAL MEDICINE

## 2022-12-27 PROCEDURE — 83540 ASSAY OF IRON: CPT | Performed by: INTERNAL MEDICINE

## 2022-12-27 PROCEDURE — 25010000002 HYDROMORPHONE PER 4 MG: Performed by: NURSE PRACTITIONER

## 2022-12-27 PROCEDURE — 25010000002 PIPERACILLIN SOD-TAZOBACTAM PER 1 G: Performed by: STUDENT IN AN ORGANIZED HEALTH CARE EDUCATION/TRAINING PROGRAM

## 2022-12-27 PROCEDURE — 25010000002 HYDROMORPHONE 1 MG/ML SOLUTION: Performed by: NURSE PRACTITIONER

## 2022-12-27 PROCEDURE — 80076 HEPATIC FUNCTION PANEL: CPT | Performed by: STUDENT IN AN ORGANIZED HEALTH CARE EDUCATION/TRAINING PROGRAM

## 2022-12-27 PROCEDURE — 84100 ASSAY OF PHOSPHORUS: CPT | Performed by: STUDENT IN AN ORGANIZED HEALTH CARE EDUCATION/TRAINING PROGRAM

## 2022-12-27 PROCEDURE — 82565 ASSAY OF CREATININE: CPT | Performed by: STUDENT IN AN ORGANIZED HEALTH CARE EDUCATION/TRAINING PROGRAM

## 2022-12-27 PROCEDURE — 85025 COMPLETE CBC W/AUTO DIFF WBC: CPT | Performed by: STUDENT IN AN ORGANIZED HEALTH CARE EDUCATION/TRAINING PROGRAM

## 2022-12-27 PROCEDURE — 99222 1ST HOSP IP/OBS MODERATE 55: CPT | Performed by: SURGERY

## 2022-12-27 PROCEDURE — 80074 ACUTE HEPATITIS PANEL: CPT | Performed by: NURSE PRACTITIONER

## 2022-12-27 PROCEDURE — 63710000001 DEXAMETHASONE PER 0.25 MG: Performed by: STUDENT IN AN ORGANIZED HEALTH CARE EDUCATION/TRAINING PROGRAM

## 2022-12-27 RX ORDER — ENOXAPARIN SODIUM 100 MG/ML
40 INJECTION SUBCUTANEOUS EVERY 24 HOURS
Status: DISCONTINUED | OUTPATIENT
Start: 2022-12-27 | End: 2022-12-31 | Stop reason: HOSPADM

## 2022-12-27 RX ORDER — SODIUM CHLORIDE 9 MG/ML
50 INJECTION, SOLUTION INTRAVENOUS CONTINUOUS
Status: DISCONTINUED | OUTPATIENT
Start: 2022-12-27 | End: 2022-12-29

## 2022-12-27 RX ORDER — NADOLOL 20 MG/1
20 TABLET ORAL DAILY
Status: DISCONTINUED | OUTPATIENT
Start: 2022-12-27 | End: 2022-12-31 | Stop reason: HOSPADM

## 2022-12-27 RX ORDER — PANTOPRAZOLE SODIUM 40 MG/1
40 TABLET, DELAYED RELEASE ORAL
Status: DISCONTINUED | OUTPATIENT
Start: 2022-12-28 | End: 2022-12-31 | Stop reason: HOSPADM

## 2022-12-27 RX ORDER — TACROLIMUS 1 MG/1
1 CAPSULE ORAL 2 TIMES DAILY
Status: DISCONTINUED | OUTPATIENT
Start: 2022-12-27 | End: 2022-12-31 | Stop reason: HOSPADM

## 2022-12-27 RX ORDER — MYCOPHENOLATE MOFETIL 250 MG/1
250 CAPSULE ORAL EVERY MORNING
Status: DISCONTINUED | OUTPATIENT
Start: 2022-12-28 | End: 2022-12-31 | Stop reason: HOSPADM

## 2022-12-27 RX ORDER — HYDROMORPHONE HYDROCHLORIDE 1 MG/ML
0.5 INJECTION, SOLUTION INTRAMUSCULAR; INTRAVENOUS; SUBCUTANEOUS
Status: DISCONTINUED | OUTPATIENT
Start: 2022-12-27 | End: 2022-12-31 | Stop reason: HOSPADM

## 2022-12-27 RX ADMIN — ONDANSETRON HYDROCHLORIDE 4 MG: 4 TABLET, FILM COATED ORAL at 20:09

## 2022-12-27 RX ADMIN — HYDROMORPHONE HYDROCHLORIDE 0.5 MG: 1 INJECTION, SOLUTION INTRAMUSCULAR; INTRAVENOUS; SUBCUTANEOUS at 11:58

## 2022-12-27 RX ADMIN — ENOXAPARIN SODIUM 40 MG: 100 INJECTION SUBCUTANEOUS at 15:53

## 2022-12-27 RX ADMIN — TAZOBACTAM SODIUM AND PIPERACILLIN SODIUM 3.38 G: 375; 3 INJECTION, SOLUTION INTRAVENOUS at 11:58

## 2022-12-27 RX ADMIN — REMDESIVIR 200 MG: 100 INJECTION, POWDER, LYOPHILIZED, FOR SOLUTION INTRAVENOUS at 17:10

## 2022-12-27 RX ADMIN — SODIUM CHLORIDE 50 ML/HR: 9 INJECTION, SOLUTION INTRAVENOUS at 11:58

## 2022-12-27 RX ADMIN — TAZOBACTAM SODIUM AND PIPERACILLIN SODIUM 3.38 G: 375; 3 INJECTION, SOLUTION INTRAVENOUS at 03:37

## 2022-12-27 RX ADMIN — TAZOBACTAM SODIUM AND PIPERACILLIN SODIUM 3.38 G: 375; 3 INJECTION, SOLUTION INTRAVENOUS at 20:09

## 2022-12-27 RX ADMIN — TACROLIMUS 1 MG: 1 CAPSULE ORAL at 20:09

## 2022-12-27 RX ADMIN — HYDROMORPHONE HYDROCHLORIDE 0.5 MG: 1 INJECTION, SOLUTION INTRAMUSCULAR; INTRAVENOUS; SUBCUTANEOUS at 20:09

## 2022-12-27 RX ADMIN — Medication 1 PACKET: at 20:09

## 2022-12-27 RX ADMIN — HYDROMORPHONE HYDROCHLORIDE 0.5 MG: 1 INJECTION, SOLUTION INTRAMUSCULAR; INTRAVENOUS; SUBCUTANEOUS at 03:38

## 2022-12-27 RX ADMIN — DEXAMETHASONE 6 MG: 4 TABLET ORAL at 15:53

## 2022-12-27 NOTE — CONSULTS
[facility]  Initial Inpatient Consult Note    Referring Provider: Abnormal CT abdomen    Reason for Consultation: LHA    Subjective     History of present illness:      Thank you for requesting my opinion.    67-year-old woman with a history of polycystic kidney disease status postrenal transplant on immunosuppression admitted to the ER yesterday with complaints of acute onset right upper quadrant pain.  Gastroenterology is consulted for abnormal CT imaging of the abdomen.    She states she has felt poorly for the past few days with malaise, fatigue and poor appetite.  Yesterday, while moving out of bed, she developed acute onset of sharp right-sided abdominal pain.  She said it was greater than 10 out of 10 in severity.  Pain has reduced but she is  to palpation up under the rib cage in the right upper quadrant.    CT imaging notes mild thickening and irregularity of the gallbladder with the radiologist raising concern for a calculus cholecystitis..  Nodular appearance of liver consistent with cirrhosis, splenomegaly with collateral vessel formation consistent with portal hypertension.  Liver cyst.  Colonic diverticulosis.  Thick-walled segment of jejunum consistent with enteritis.  Full thickening and irregularity of the duodenum suggesting duodenitis.    She is now on antibiotics for multifocal pneumonia findings on yesterday's chest x-ray.  Blood and urine cultures are pending.    Review of her chart indicates that she has known cirrhosis since 2017, she has had a liver biopsy, and this is thought to be associated with her polycystic disease.  She is followed by Dr. Hernandez with [facility] and has most recently seen his nurse practitioner though she is due for follow-up.  Her last EGD was July 2021 and notable for small esophageal varices.    Labs are notable for a mildly elevated AST level.        Past Medical History:  Past Medical History:   Diagnosis Date  "  • Acute cystitis without hematuria 12/27/2022   • Kidney transplanted    • Right upper quadrant abdominal pain 12/27/2022   • Tachycardia     Had ablation       Past Surgical History:  Past Surgical History:   Procedure Laterality Date   • PARATHYROIDECTOMY      \"All removed but 1/2 of one\"   • TRANSPLANTATION RENAL          Social History:   Social History     Tobacco Use   • Smoking status: Never   • Smokeless tobacco: Never   Substance Use Topics   • Alcohol use: Never        Family History:  History reviewed. No pertinent family history.    Home Meds:  Medications Prior to Admission   Medication Sig Dispense Refill Last Dose   • multivitamin with minerals (MULTIVITAMIN ADULT PO) Take 1 tablet by mouth Daily.   12/26/2022   • mycophenolate (CELLCEPT) 250 MG capsule Take 250 mg by mouth Every Morning.   12/26/2022   • nadolol (CORGARD) 20 MG tablet Take 20 mg by mouth Daily.   12/26/2022   • tacrolimus (PROGRAF) 1 MG capsule Take 1 mg by mouth 2 (Two) Times a Day.   12/26/2022       Current Meds:   piperacillin-tazobactam, 3.375 g, Intravenous, Q8H  sodium chloride, 10 mL, Intravenous, Q12H        Allergies:  No Known Allergies    Review of Systems  All systems were reviewed and negative except for:  Constitution:  positive for anorexia, fatigue and malaise  Gastrointestinal: positive for  pain     Objective     Vital Signs  Temp:  [97 °F (36.1 °C)-101.2 °F (38.4 °C)] 97.5 °F (36.4 °C)  Heart Rate:  [] 61  Resp:  [16-20] 20  BP: (101-139)/(68-85) 129/72    Physical Exam:  Constitutional:   Alert, cooperative, in no acute distress, appears stated age   Eyes:           Lids and lashes normal, conjunctivae and sclerae normal,   no icterus   Ears, nose, mouth and throat:  Normal appearance of external ears and nose, no oral l  lesions, no thrush, oral mucosa moist   Respiratory:    Clear to auscultation, respirations regular, even and             Unlabored on nasal cannula    Cardiovascular:   Regular rhythm " and normal rate, normal S1 and S2, no        murmur, no gallop, palpable distal pulses, no lower extremity edema   Gastrointestinal:    Soft, nondistended, mildly tender to palpation, no guarding, no rebound tenderness, normal bowel sounds, no palpable masses or organomegaly  Rectal exam: deferred   Musculoskeletal:  Normal station, no atrophy, no tenderness to palpation, normal digits and nails   Skin:  Normal color, no bleeding, bruising, rashes or lesions   Lymphatics:  No palpable cervical or supraclavicular adenopathy   Psychiatric:  Judgement and insight: normal   Orientation to person, place and time: normal   Mood and affect: normal       Results Review:   I reviewed the patient's new clinical results.    Results from last 7 days   Lab Units 12/26/22  1755   WBC 10*3/mm3 7.03   HEMOGLOBIN g/dL 10.6*   HEMATOCRIT % 33.4*   PLATELETS 10*3/mm3 145       Results from last 7 days   Lab Units 12/26/22  1755   SODIUM mmol/L 138   POTASSIUM mmol/L 3.9   CHLORIDE mmol/L 107   CO2 mmol/L 23.7   BUN mg/dL 19   CREATININE mg/dL 0.91   CALCIUM mg/dL 9.3   BILIRUBIN mg/dL 0.7   ALK PHOS U/L 89   ALT (SGPT) U/L 29   AST (SGOT) U/L 37*   GLUCOSE mg/dL 137*             Lab Results   Lab Value Date/Time    LIPASE 36 12/26/2022 1755    LIPASE 32 07/28/2021 1137       Radiology:  Imaging Results (Last 72 Hours)     Procedure Component Value Units Date/Time    CT Abdomen Pelvis With Contrast [[id number]] Collected: 12/26/22 1939     Updated: 12/26/22 2012    Narrative:      CT ABDOMEN PELVIS W CONTRAST-     Radiation dose reduction techniques were utilized, including automated  exposure control and exposure modulation based on body size.           Clinical: Right upper quadrant pain, febrile     Correlation with gallbladder ultrasound earlier at 1831 hours.     FINDINGS: There is mild thickening and irregularity of the gallbladder  wall which was not appreciated on the ultrasound examination.  Potentially calculus cholecystitis.  CBD is dilated, 11 mm in diameter.  This smoothly tapers to the ampulla. No stone demonstrated. Possible  small diverticulum at this location measuring 13 mm, partially filled  with gas.     The liver is grossly enlarged and heterogenous in attenuation with  superficial irregularity and a nodular appearance consistent with  cirrhosis. Areas of diminished attenuation likely fatty infiltration.  Underlying diffuse hepatocellular disease or neoplasm possible. The  spleen is enlarged and there is extensive collateral vessel formation  throughout the upper abdomen with recannulization of the umbilical vein  consistent with portal hypertension. Within the right lobe of the liver  there is a 14 mm area of nodularity having the appearance of a cyst. The  pancreas is satisfactory in appearance.     Both adrenal glands are normal. The native kidneys are small and there  is nephrocalcinosis. There is 2 cm right renal cyst. Right pelvic renal  transplant demonstrates a normal pattern of enhancement, no obstructive  uropathy mass or calculus. There is wall thickening demonstrated along  the right lateral border of the urinary bladder. There is some serosal  spiculation at this location. Cystitis not excluded. Tiny gas bubble is  noted within, has a been recent catheterization?     There is diverticulosis of the colon. No indication of diverticulitis.  Within the midabdomen there is a thick-walled segment of jejunum  measuring approximately 12 cm in length. This consistent with enteritis.  The stomach is collapsed. Duodenal bulb is typical in appearance. There  is some fold thickening and irregularity of the second third and fourth  portions of the duodenum suggestive of duodenitis.     No free air or free intraperitoneal fluid. The uterus is small in size,  appropriate for age, no ovarian abnormality. There are Tarlov cysts.     Conclusion:  1. The liver is grossly abnormal, it is enlarged with surface  irregularity and  heterogenous. The appearance is consistent with  cirrhosis with likely fatty infiltration. Underlying hepatitis or  hepatocellular carcinoma not excluded. There is splenomegaly and  extensive collateral vessel formation consistent with portal  hypertension.  2. There is gallbladder wall thickening and irregularity, possible  acalculus cholecystitis. CBD is dilated at 11 mm with smooth tapering to  the ampulla. No definite stone identified. At this location there  appears to be a partially gas-filled diverticulum.  3. Renal transplant is satisfactory in appearance. There is asymmetric  wall thickening of the right bladder with serosal irregularity/edema  worrisome for cystitis.  4. Long segment of jejunum which demonstrates wall thickening. In  addition there is fold irregularity of the duodenum, findings suggest  enteritis/duodenitis.  5. Diverticulosis of the colon. No diverticulitis.     MRI/MRCP would be helpful as follow-up.        This report was finalized on 12/26/2022 8:09 PM by Dr. Tong Noriega M.D.       XR Chest 1 View [[id number]] Collected: 12/26/22 1922     Updated: 12/26/22 1930    Narrative:      SINGLE VIEW OF THE CHEST     HISTORY: Fever     COMPARISON: None available.     FINDINGS:  Cardiomegaly is present. There is tortuosity of the aorta. No  pneumothorax is seen. There is some blunting the left costophrenic  angle. There are multifocal pulmonary opacities which are noted within  the right upper lobe, concerning for pneumonia. There is some additional  mild consolidation noted at the lung bases bilaterally. This may  represent atelectasis and/or scarring, although additional infiltrate is  not excluded.       Impression:      Multifocal pulmonary opacities, concerning for pneumonia.     This report was finalized on 12/26/2022 7:24 PM by Dr. Jackelyn Merritt M.D.       US Gallbladder [[id number]] Collected: 12/26/22 1857     Updated: 12/26/22 1908    Narrative:      US GALLBLADDER-clinical:  Fever with right upper quadrant pain     Renal transplant     COMPARISON: None     FINDINGS: Only a small portion of the pancreatic body could be  visualized and is within normal limits however the majority is obscured.  No pancreatic ductal dilatation seen. No peripancreatic fluid  identified.     There is some coarsening of the overall hepatic echotexture in addition  there is some surface irregularity of the liver, the findings are  worrisome for cirrhosis. No intrahepatic ductal dilatation.     The native right kidney is small, 4 cm in length. The transplant is 11  cm in length. Transplant cortical echotexture is within normal limits,  no obstructive uropathy calculus or mass is demonstrated. No renal RI  calculated.     The gallbladder is satisfactory in appearance. No gallstones or  gallbladder wall thickening nor pericholecystic fluid. CBD is dilated at  12 mm. Pancreatic protocol CT would be the best means for further  evaluation. No free fluid is demonstrated within the upper abdomen.     CONCLUSION: The gallbladder is satisfactory in appearance, no gallstones  seen. CBD diameter however is abnormal at 12 mm. Only small portion of  the pancreatic body could be visualized. Pancreatic protocol CT would be  the best means for further evaluation if possible.     The right renal transplant is satisfactory in appearance. There is  coarsening of the hepatic echotexture with surface irregularity  worrisome for cirrhosis. No free fluid is demonstrated within the right  upper quadrant.     This report was finalized on 12/26/2022 7:05 PM by Dr. Tong Noriega M.D.             Assessment & Plan       Multifocal pneumonia    Right upper quadrant abdominal pain    History of kidney transplant    Acute cystitis without hematuria      Impression  1.  Abnormal CT imaging of the abdomen with duodenitis, thickened jejunum    2.  Cirrhosis with portal hypertension: Chronic issue, thought to be related to her polycystic  disease    3.  Acute right upper quadrant pain: Concerns for acalculus cholecystitis    4.  History of polycystic kidney disease status postrenal transplant on immunosuppression    5.  Multifocal pneumonia: On antibiotics    Plan  Await general surgery's input regarding acalculus cholecystitis  If there is no concern for a calculus cholecystitis, will plan for EGD to further assess duodenitis seen on CT imaging when cleared by Dr. Grove from her pneumonia standpoint  Will defer diet to general surgery  Advised her and her family to contact Dr. Hernandez's office to arrange for hospital follow-up regarding her cirrhosis; she is established with that group    I discussed the patients findings and my recommendations with patient and family    All necessary PPE, including face mask and eye protection, were worn during this encounter.  Hand sanitization was performed both before and after the patient interaction.    Patt Wilkins MD  [facility]      Dictated utilizing Dragon dictation

## 2022-12-27 NOTE — CONSULTS
12/27/2022: Consult to Infection Prevention nurse received regarding positive COVID-19 testing results. Patient is COVID positive as noted with respiratory viral panel testing. Enhanced Droplet and Contact Precautions required. Cheryl LEBRONN, RN, CIC

## 2022-12-27 NOTE — PLAN OF CARE
Problem: Adult Inpatient Plan of Care  Goal: Plan of Care Review  Outcome: Ongoing, Progressing  Goal: Patient-Specific Goal (Individualized)  Outcome: Ongoing, Progressing  Goal: Absence of Hospital-Acquired Illness or Injury  Outcome: Ongoing, Progressing  Intervention: Prevent Skin Injury  Recent Flowsheet Documentation  Taken 12/26/2022 2200 by Aida Rich RN  Skin Protection:   tubing/devices free from skin contact   transparent dressing maintained   adhesive use limited  Intervention: Prevent and Manage VTE (Venous Thromboembolism) Risk  Recent Flowsheet Documentation  Taken 12/26/2022 2200 by Aida Rich RN  VTE Prevention/Management: sequential compression devices on  Range of Motion: active ROM (range of motion) encouraged  Goal: Optimal Comfort and Wellbeing  Outcome: Ongoing, Progressing  Intervention: Monitor Pain and Promote Comfort  Recent Flowsheet Documentation  Taken 12/26/2022 2200 by Aida Rich RN  Pain Management Interventions:   quiet environment facilitated   position adjusted   pillow support provided   diversional activity provided   care clustered  Intervention: Provide Person-Centered Care  Recent Flowsheet Documentation  Taken 12/26/2022 2200 by Aida Rich RN  Trust Relationship/Rapport:   care explained   choices provided   emotional support provided   empathic listening provided   questions answered  Goal: Readiness for Transition of Care  Outcome: Ongoing, Progressing  Intervention: Mutually Develop Transition Plan  Recent Flowsheet Documentation  Taken 12/26/2022 2157 by Aida Rich RN  Transportation Anticipated: family or friend will provide  Patient/Family Anticipated Services at Transition: none  Patient/Family Anticipates Transition to: home with family     Problem: Fall Injury Risk  Goal: Absence of Fall and Fall-Related Injury  Outcome: Ongoing, Progressing     Problem: Pain Acute  Goal: Acceptable Pain Control and Functional Ability  Outcome:  Ongoing, Progressing  Intervention: Develop Pain Management Plan  Recent Flowsheet Documentation  Taken 12/26/2022 2200 by Aida Rich, RN  Pain Management Interventions:   quiet environment facilitated   position adjusted   pillow support provided   diversional activity provided   care clustered  Intervention: Optimize Psychosocial Wellbeing  Recent Flowsheet Documentation  Taken 12/26/2022 2200 by Aida Rich, RN  Diversional Activities:   smartphone   television   Goal Outcome Evaluation:

## 2022-12-27 NOTE — PROGRESS NOTES
"[facility]: Enoxaparin Consult    Jacey Valdez has a pharmacy consult to dose prophylactic enoxaparin per Dr. Harrison's request.     Indication: VTE Prophylaxis  Home Anticoagulation: none     Relevant clinical data and objective history reviewed:  67 y.o. female 180.3 cm (71\") 85.5 kg (188 lb 9.6 oz)   Body mass index is 26.3 kg/m².   Results from last 7 days   Lab Units 12/27/22  0945   PLATELETS 10*3/mm3 105*     Estimated Creatinine Clearance: 75.6 mL/min (by C-G formula based on SCr of 0.85 mg/dL).    Assessment/Plan    Will start patient on 40mg subcutaneous every 24 hours, adjusted for renal function. Consult order will be discontinued but pharmacy will continue to follow.     Jenaro Kamara [facility]  Clinical Pharmacist    "

## 2022-12-27 NOTE — PROGRESS NOTES
Name: Jacey Valdez ADMIT: 2022   : 1955  PCP: Morales Alan MD    MRN: [id number] LOS: 1 days   AGE/SEX: 67 y.o. female  ROOM: [facility]     Subjective   Subjective     No events overnight. No new complaints. Does endorse some cough and fatigue and continued right upper quadrant pain       Objective   Objective   Vital Signs  Temp:  [97 °F (36.1 °C)-101.2 °F (38.4 °C)] 97.3 °F (36.3 °C)  Heart Rate:  [] 61  Resp:  [16-20] 20  BP: (101-139)/(68-85) 111/68  SpO2:  [89 %-98 %] 96 %  on  Flow (L/min):  [2] 2;   Device (Oxygen Therapy): nasal cannula  Body mass index is 26.3 kg/m².  Physical Exam  Constitutional:       General: She is not in acute distress.     Appearance: She is not toxic-appearing.   Cardiovascular:      Rate and Rhythm: Normal rate and regular rhythm.      Heart sounds: Normal heart sounds.   Pulmonary:      Effort: Pulmonary effort is normal. No respiratory distress.      Breath sounds: Normal breath sounds. No wheezing, rhonchi or rales.   Abdominal:      General: Bowel sounds are normal. There is no distension.      Palpations: Abdomen is soft.      Tenderness: There is abdominal tenderness. There is no guarding or rebound.   Musculoskeletal:         General: No tenderness.      Right lower leg: No edema.      Left lower leg: No edema.   Neurological:      Mental Status: She is alert.   Psychiatric:         Mood and Affect: Mood normal.         Behavior: Behavior normal.         Results Review     I reviewed the patient's new clinical results.  Results from last 7 days   Lab Units 22  0945 22  1755   WBC 10*3/mm3 3.78 7.03   HEMOGLOBIN g/dL 9.7* 10.6*   PLATELETS 10*3/mm3 105* 145     Results from last 7 days   Lab Units 22  0945 22  1755   SODIUM mmol/L 138 138   POTASSIUM mmol/L 4.0 3.9   CHLORIDE mmol/L 107 107   CO2 mmol/L 24.6 23.7   BUN mg/dL 17 19   CREATININE mg/dL 0.85 0.91   GLUCOSE mg/dL 124* 137*   Estimated Creatinine Clearance:  75.6 mL/min (by C-G formula based on SCr of 0.85 mg/dL).  Results from last 7 days   Lab Units 12/26/22  1755   ALBUMIN g/dL 2.9*   BILIRUBIN mg/dL 0.7   ALK PHOS U/L 89   AST (SGOT) U/L 37*   ALT (SGPT) U/L 29     Results from last 7 days   Lab Units 12/27/22  0945 12/26/22  1755   CALCIUM mg/dL 8.9 9.3   ALBUMIN g/dL  --  2.9*   MAGNESIUM mg/dL 1.8  --    PHOSPHORUS mg/dL 2.3*  --      Results from last 7 days   Lab Units 12/26/22  1755   PROCALCITONIN ng/mL 0.26*   LACTATE mmol/L 1.0     COVID19   Date Value Ref Range Status   12/27/2022 Detected (C) Not Detected - Ref. Range Final     No results found for: HGBA1C, POCGLU    CT Abdomen Pelvis With Contrast  CT ABDOMEN PELVIS W CONTRAST-     Radiation dose reduction techniques were utilized, including automated  exposure control and exposure modulation based on body size.           Clinical: Right upper quadrant pain, febrile     Correlation with gallbladder ultrasound earlier at 1831 hours.     FINDINGS: There is mild thickening and irregularity of the gallbladder  wall which was not appreciated on the ultrasound examination.  Potentially calculus cholecystitis. CBD is dilated, 11 mm in diameter.  This smoothly tapers to the ampulla. No stone demonstrated. Possible  small diverticulum at this location measuring 13 mm, partially filled  with gas.     The liver is grossly enlarged and heterogenous in attenuation with  superficial irregularity and a nodular appearance consistent with  cirrhosis. Areas of diminished attenuation likely fatty infiltration.  Underlying diffuse hepatocellular disease or neoplasm possible. The  spleen is enlarged and there is extensive collateral vessel formation  throughout the upper abdomen with recannulization of the umbilical vein  consistent with portal hypertension. Within the right lobe of the liver  there is a 14 mm area of nodularity having the appearance of a cyst. The  pancreas is satisfactory in appearance.     Both adrenal  glands are normal. The native kidneys are small and there  is nephrocalcinosis. There is 2 cm right renal cyst. Right pelvic renal  transplant demonstrates a normal pattern of enhancement, no obstructive  uropathy mass or calculus. There is wall thickening demonstrated along  the right lateral border of the urinary bladder. There is some serosal  spiculation at this location. Cystitis not excluded. Tiny gas bubble is  noted within, has a been recent catheterization?     There is diverticulosis of the colon. No indication of diverticulitis.  Within the midabdomen there is a thick-walled segment of jejunum  measuring approximately 12 cm in length. This consistent with enteritis.  The stomach is collapsed. Duodenal bulb is typical in appearance. There  is some fold thickening and irregularity of the second third and fourth  portions of the duodenum suggestive of duodenitis.     No free air or free intraperitoneal fluid. The uterus is small in size,  appropriate for age, no ovarian abnormality. There are Tarlov cysts.     Conclusion:  1. The liver is grossly abnormal, it is enlarged with surface  irregularity and heterogenous. The appearance is consistent with  cirrhosis with likely fatty infiltration. Underlying hepatitis or  hepatocellular carcinoma not excluded. There is splenomegaly and  extensive collateral vessel formation consistent with portal  hypertension.  2. There is gallbladder wall thickening and irregularity, possible  acalculus cholecystitis. CBD is dilated at 11 mm with smooth tapering to  the ampulla. No definite stone identified. At this location there  appears to be a partially gas-filled diverticulum.  3. Renal transplant is satisfactory in appearance. There is asymmetric  wall thickening of the right bladder with serosal irregularity/edema  worrisome for cystitis.  4. Long segment of jejunum which demonstrates wall thickening. In  addition there is fold irregularity of the duodenum, findings  suggest  enteritis/duodenitis.  5. Diverticulosis of the colon. No diverticulitis.     MRI/MRCP would be helpful as follow-up.        This report was finalized on 12/26/2022 8:09 PM by Dr. Tong Noriega M.D.     XR Chest 1 View  Narrative: SINGLE VIEW OF THE CHEST     HISTORY: Fever     COMPARISON: None available.     FINDINGS:  Cardiomegaly is present. There is tortuosity of the aorta. No  pneumothorax is seen. There is some blunting the left costophrenic  angle. There are multifocal pulmonary opacities which are noted within  the right upper lobe, concerning for pneumonia. There is some additional  mild consolidation noted at the lung bases bilaterally. This may  represent atelectasis and/or scarring, although additional infiltrate is  not excluded.     Impression: Multifocal pulmonary opacities, concerning for pneumonia.     This report was finalized on 12/26/2022 7:24 PM by Dr. Jackelyn Merritt M.D.     US Gallbladder  US GALLBLADDER-clinical: Fever with right upper quadrant pain     Renal transplant     COMPARISON: None     FINDINGS: Only a small portion of the pancreatic body could be  visualized and is within normal limits however the majority is obscured.  No pancreatic ductal dilatation seen. No peripancreatic fluid  identified.     There is some coarsening of the overall hepatic echotexture in addition  there is some surface irregularity of the liver, the findings are  worrisome for cirrhosis. No intrahepatic ductal dilatation.     The native right kidney is small, 4 cm in length. The transplant is 11  cm in length. Transplant cortical echotexture is within normal limits,  no obstructive uropathy calculus or mass is demonstrated. No renal RI  calculated.     The gallbladder is satisfactory in appearance. No gallstones or  gallbladder wall thickening nor pericholecystic fluid. CBD is dilated at  12 mm. Pancreatic protocol CT would be the best means for further  evaluation. No free fluid is demonstrated  within the upper abdomen.     CONCLUSION: The gallbladder is satisfactory in appearance, no gallstones  seen. CBD diameter however is abnormal at 12 mm. Only small portion of  the pancreatic body could be visualized. Pancreatic protocol CT would be  the best means for further evaluation if possible.     The right renal transplant is satisfactory in appearance. There is  coarsening of the hepatic echotexture with surface irregularity  worrisome for cirrhosis. No free fluid is demonstrated within the right  upper quadrant.     This report was finalized on 12/26/2022 7:05 PM by Dr. Tong Noriega M.D.       Scheduled Medications  dexamethasone, 6 mg, Oral, Daily With Breakfast  piperacillin-tazobactam, 3.375 g, Intravenous, Q8H  remdesivir, 200 mg, Intravenous, Q24H   Followed by  [START ON 12/28/2022] remdesivir, 100 mg, Intravenous, Q24H  sodium chloride, 10 mL, Intravenous, Q12H    Infusions  Pharmacy Consult - Remdesivir,   sodium chloride, 50 mL/hr, Last Rate: 50 mL/hr (12/27/22 1158)    Diet  Diet: Liquid Diets; Clear Liquid; Texture: Regular Texture (IDDSI 7); Fluid Consistency: Thin (IDDSI 0)       Assessment/Plan     Active Hospital Problems    Diagnosis  POA   • **Pneumonia due to COVID-19 virus [U07.1, J12.82]  Yes   • Right upper quadrant abdominal pain [R10.11]  Yes   • Acalculous cholecystitis [K81.9]  Yes   • Duodenitis [K29.80]  Yes   • Cirrhosis of liver (HCC) [K74.60]  Yes   • Multifocal pneumonia [J18.9]  Yes   • History of kidney transplant [Z94.0]  Not Applicable      Resolved Hospital Problems   No resolved problems to display.       67 y.o. female admitted with Pneumonia due to COVID-19 virus.    · COVID-19 pneumonia causing hypoxemia-start remdesivir x 5 days or until discharge and dexamethasone 6 mg daily for 10 days or until discharge. Monitor daily CRP. procalcitonin is very slightly elevated, but in the absence of a consolidation or leukocytosis I think this is nonspecific and could be  related to covid itself or her acalculous cholecystitis. Regardless, she is on zosyn.  · Acalculous cholecystitis-on zosyn. General surgery is following and if she doesn't improve they will plan cholecystostomy tube as she isn't a candidate for cholecystectomy at this time due to cirrhosis  · Duodenitis-GI is consulted and considering EGD. No objection from my standpoint to proceed, but I do recognize that often COVID infection prevents non-urgent diagnostic procedures. Add PPI   · Asymptomatic bacturia-on zosyn, but I doubt that she has a UTI  · Cirrhosis with history of varices-continue nadolol   · History of renal transplantation for polycystic kidney disease-continue cellcept and tacrolimus  · Pharmacy to dose Lovenox for DVT prophylaxis.  · Full code.  · Discussed with patient, spouse and nursing staff.  · Anticipate discharge home with family timing yet to be determined.      Pablo Grove MD  [facility]  12/27/22  14:48 EST    I wore protective equipment throughout this patient encounter including a face mask, gloves and protective eyewear.  Hand hygiene was performed before donning protective equipment and after removal when leaving the room.

## 2022-12-27 NOTE — H&P
Patient Name:  Jacey Valdez  YOB: 1955  MRN:  [id number]  Admit Date:  12/26/2022  Patient Care Team:  Morales Alan MD as PCP - General (Family Medicine)      Subjective   History Present Illness     Chief Complaint   Patient presents with    Abdominal Pain         History of Present Illness   Ms. Valdez is a 67 y.o. old female with a past medical history that includes polycystic kidney disease status post renal transplant, HTN and tachycardia who presents to [facility] today with complaints of 1 day 10/10 non radiating sharp stabbing right upper quadrant pain with no exacerbating or eliminating factors.  She also endorses some mild nausea. She denies any other acute distress chest pain, shortness of breath, palpitations, lightheadedness, dizziness, syncope, headache, fever, chills, vomiting, diarrhea, constipation, or  symptoms.  Patient does also report chronic sinusitis, with drainage and nonproductive cough.      Initial evaluation in the emergency department revealed  Fever of 101.2, chest x-ray was remarkable for multifocal pneumonia, initially 89% on room air was placed on 2 L nasal cannula with increased oxygen saturation of 97%.  Patient currently on room air with oxygen saturations of 94%.  Normal white count of 7.03, noted decreased hemoglobin 10.6, and hematocrit 33.4 which looks to be close to her baseline, a mildly elevated AST of 37.  Urinalysis positive nitrates, 1+ leukocytes, 6-12 WBCs, 2+ bacteria, with 3-6 squamous epithelial cells.  CT scan remarkable for grossly abnormal liver enlarged with surface irregularities and heterogeneous.  Consistent with cirrhosis.  Splenomegaly with extensive collateral vessel formation consistent with portal hypertension.  There is gallbladder wall thickening and irregularity possible acalculous cholecystitis.  Common bile duct is dilated at 11 mm with smooth tapering to the ampulla.  Asymmetric wall thickening of the  "right bladder with serosal irregularities consistent with cystitis.  Long segment of jejunum demonstrates wall thickening and additional fold irregularities of the duodenum, enteritis versus duodenitis.  And diverticulosis of the colon is noted.  Abdominal ultrasound shows satisfactory appearance of gallbladder with no gallstones seen.,  Bile duct dilation at 12 mm.  Coarsening of hepatic echotexture with surface irregularities consistent with cirrhosis.    Patient will be admitted to the hospitalist group for further evaluation and treatment of right upper quadrant pain, possible cirrhosis, acute cystitis, and other acute and or chronic conditions.  Review of Systems   Constitutional: Positive for appetite change.   HENT: Negative.    Eyes: Negative.    Respiratory: Negative.    Cardiovascular: Negative.    Gastrointestinal: Positive for abdominal pain and nausea.   Endocrine: Negative.    Genitourinary: Negative.    Musculoskeletal: Negative.    Skin: Negative.    Allergic/Immunologic: Negative.    Neurological: Negative.    Hematological: Negative.    Psychiatric/Behavioral: Negative.    All other systems reviewed and are negative.       Personal History     Past Medical History:   Diagnosis Date    Kidney transplanted     Right upper quadrant abdominal pain 12/27/2022    Tachycardia     Had ablation     Past Surgical History:   Procedure Laterality Date    PARATHYROIDECTOMY      \"All removed but 1/2 of one\"    TRANSPLANTATION RENAL       History reviewed. No pertinent family history.  Social History     Tobacco Use    Smoking status: Never    Smokeless tobacco: Never   Vaping Use    Vaping Use: Never used   Substance Use Topics    Alcohol use: Never    Drug use: Never     No current facility-administered medications on file prior to encounter.     Current Outpatient Medications on File Prior to Encounter   Medication Sig Dispense Refill    multivitamin with minerals (MULTIVITAMIN ADULT PO) Take 1 tablet by mouth " Daily.      mycophenolate (CELLCEPT) 250 MG capsule Take 250 mg by mouth Every Morning.      nadolol (CORGARD) 20 MG tablet Take 20 mg by mouth Daily.      tacrolimus (PROGRAF) 1 MG capsule Take 1 mg by mouth 2 (Two) Times a Day.       No Known Allergies    Objective    Objective     Vital Signs  Temp:  [97 °F (36.1 °C)-101.2 °F (38.4 °C)] 97.7 °F (36.5 °C)  Heart Rate:  [] 68  Resp:  [16-20] 20  BP: (101-139)/(68-85) 101/68  SpO2:  [89 %-98 %] 92 %  on  Flow (L/min):  [2] 2;   Device (Oxygen Therapy): nasal cannula  Body mass index is 26.3 kg/m².    Physical Exam  Vitals and nursing note reviewed.   Constitutional:       Appearance: Normal appearance.   HENT:      Mouth/Throat:      Mouth: Mucous membranes are dry.   Eyes:      Conjunctiva/sclera: Conjunctivae normal.   Cardiovascular:      Rate and Rhythm: Normal rate and regular rhythm.      Pulses: Normal pulses.      Heart sounds: Normal heart sounds.   Pulmonary:      Effort: Pulmonary effort is normal.      Breath sounds: Normal breath sounds.   Abdominal:      General: There is distension.      Palpations: Abdomen is soft.      Tenderness: There is abdominal tenderness.   Musculoskeletal:         General: Normal range of motion.   Skin:     General: Skin is warm and dry.      Capillary Refill: Capillary refill takes less than 2 seconds.   Neurological:      General: No focal deficit present.      Mental Status: She is alert and oriented to person, place, and time.   Psychiatric:         Mood and Affect: Mood normal.         Behavior: Behavior normal.         Results Review:  I reviewed the patient's new clinical results.  I reviewed the patient's new imaging results and agree with the interpretation.  I reviewed the patient's other test results and agree with the interpretation  I personally viewed and interpreted the patient's EKG/Telemetry data  Discussed with ED provider.    Lab Results (last 24 hours)       Procedure Component Value Units Date/Time     CBC & Differential [[id number]]  (Abnormal) Collected: 12/26/22 1755    Specimen: Blood Updated: 12/26/22 1805    Narrative:      The following orders were created for panel order CBC & Differential.  Procedure                               Abnormality         Status                     ---------                               -----------         ------                     CBC Auto Differential[[id number]]        Abnormal            Final result                 Please view results for these tests on the individual orders.    Comprehensive Metabolic Panel [[id number]]  (Abnormal) Collected: 12/26/22 1755    Specimen: Blood Updated: 12/26/22 1826     Glucose 137 mg/dL      BUN 19 mg/dL      Creatinine 0.91 mg/dL      Sodium 138 mmol/L      Potassium 3.9 mmol/L      Chloride 107 mmol/L      CO2 23.7 mmol/L      Calcium 9.3 mg/dL      Total Protein 6.9 g/dL      Albumin 2.9 g/dL      ALT (SGPT) 29 U/L      AST (SGOT) 37 U/L      Alkaline Phosphatase 89 U/L      Total Bilirubin 0.7 mg/dL      Globulin 4.0 gm/dL      A/G Ratio 0.7 g/dL      BUN/Creatinine Ratio 20.9     Anion Gap 7.3 mmol/L      eGFR 68.4 mL/min/1.73      Comment: National Kidney Foundation and American Society of Nephrology (ASN) Task Force recommended calculation based on the Chronic Kidney Disease Epidemiology Collaboration (CKD-EPI) equation refit without adjustment for race.       Narrative:      GFR Normal >60  Chronic Kidney Disease <60  Kidney Failure <15      Lipase [[id number]]  (Normal) Collected: 12/26/22 1755    Specimen: Blood Updated: 12/26/22 1826     Lipase 36 U/L     CBC Auto Differential [[id number]]  (Abnormal) Collected: 12/26/22 1755    Specimen: Blood Updated: 12/26/22 1805     WBC 7.03 10*3/mm3      RBC 3.99 10*6/mm3      Hemoglobin 10.6 g/dL      Hematocrit 33.4 %      MCV 83.7 fL      MCH 26.6 pg      MCHC 31.7 g/dL      RDW 15.1 %      RDW-SD 45.5 fl      MPV 10.4 fL      Platelets 145 10*3/mm3      Neutrophil % 83.1 %       "Lymphocyte % 6.7 %      Monocyte % 9.8 %      Eosinophil % 0.0 %      Basophil % 0.1 %      Immature Grans % 0.3 %      Neutrophils, Absolute 5.84 10*3/mm3      Lymphocytes, Absolute 0.47 10*3/mm3      Monocytes, Absolute 0.69 10*3/mm3      Eosinophils, Absolute 0.00 10*3/mm3      Basophils, Absolute 0.01 10*3/mm3      Immature Grans, Absolute 0.02 10*3/mm3      nRBC 0.0 /100 WBC     Lactic Acid, Plasma [[id number]]  (Normal) Collected: 12/26/22 1755    Specimen: Blood Updated: 12/26/22 1818     Lactate 1.0 mmol/L     Blood Culture - Blood, Arm, Right [[id number]] Collected: 12/26/22 1755    Specimen: Blood from Arm, Right Updated: 12/26/22 1800    Blood Culture - Blood, Arm, Left [[id number]] Collected: 12/26/22 1755    Specimen: Blood from Arm, Left Updated: 12/26/22 1800    Procalcitonin [[id number]]  (Abnormal) Collected: 12/26/22 1755    Specimen: Blood Updated: 12/26/22 1833     Procalcitonin 0.26 ng/mL     Narrative:      As a Marker for Sepsis (Non-Neonates):    1. <0.5 ng/mL represents a low risk of severe sepsis and/or septic shock.  2. >2 ng/mL represents a high risk of severe sepsis and/or septic shock.    As a Marker for Lower Respiratory Tract Infections that require antibiotic therapy:    PCT on Admission    Antibiotic Therapy       6-12 Hrs later    >0.5                Strongly Recommended  >0.25 - <0.5        Recommended   0.1 - 0.25          Discouraged              Remeasure/reassess PCT  <0.1                Strongly Discouraged     Remeasure/reassess PCT    As 28 day mortality risk marker: \"Change in Procalcitonin Result\" (>80% or <=80%) if Day 0 (or Day 1) and Day 4 values are available. Refer to http://www.[facility]-pct-calculator.com    Change in PCT <=80%  A decrease of PCT levels below or equal to 80% defines a positive change in PCT test result representing a higher risk for 28-day all-cause mortality of patients diagnosed with severe sepsis for septic shock.    Change in PCT >80%  A decrease of " PCT levels of more than 80% defines a negative change in PCT result representing a lower risk for 28-day all-cause mortality of patients diagnosed with severe sepsis or septic shock.       Urinalysis With Culture If Indicated - Urine, Clean Catch [[id number]]  (Abnormal) Collected: 12/26/22 2239    Specimen: Urine, Clean Catch Updated: 12/26/22 2301     Color, UA Yellow     Appearance, UA Clear     pH, UA <=5.0     Specific Gravity, UA >=1.030     Glucose, UA Negative     Ketones, UA Negative     Bilirubin, UA Negative     Blood, UA Negative     Protein,  mg/dL (2+)     Leuk Esterase, UA Small (1+)     Nitrite, UA Positive     Urobilinogen, UA 1.0 E.U./dL    Narrative:      In absence of clinical symptoms, the presence of pyuria, bacteria, and/or nitrites on the urinalysis result does not correlate with infection.    Urinalysis, Microscopic Only - Urine, Clean Catch [[id number]]  (Abnormal) Collected: 12/26/22 2239    Specimen: Urine, Clean Catch Updated: 12/26/22 2301     RBC, UA None Seen /HPF      WBC, UA 6-12 /HPF      Bacteria, UA 2+ /HPF      Squamous Epithelial Cells, UA 3-6 /HPF      Hyaline Casts, UA 0-2 /LPF      Methodology Manual Light Microscopy    Urine Culture - Urine, Urine, Clean Catch [[id number]] Collected: 12/26/22 2239    Specimen: Urine, Clean Catch Updated: 12/26/22 2301            Imaging Results (Last 24 Hours)       Procedure Component Value Units Date/Time    CT Abdomen Pelvis With Contrast [[id number]] Collected: 12/26/22 1939     Updated: 12/26/22 2012    Narrative:      CT ABDOMEN PELVIS W CONTRAST-     Radiation dose reduction techniques were utilized, including automated  exposure control and exposure modulation based on body size.           Clinical: Right upper quadrant pain, febrile     Correlation with gallbladder ultrasound earlier at 1831 hours.     FINDINGS: There is mild thickening and irregularity of the gallbladder  wall which was not appreciated on the ultrasound  examination.  Potentially calculus cholecystitis. CBD is dilated, 11 mm in diameter.  This smoothly tapers to the ampulla. No stone demonstrated. Possible  small diverticulum at this location measuring 13 mm, partially filled  with gas.     The liver is grossly enlarged and heterogenous in attenuation with  superficial irregularity and a nodular appearance consistent with  cirrhosis. Areas of diminished attenuation likely fatty infiltration.  Underlying diffuse hepatocellular disease or neoplasm possible. The  spleen is enlarged and there is extensive collateral vessel formation  throughout the upper abdomen with recannulization of the umbilical vein  consistent with portal hypertension. Within the right lobe of the liver  there is a 14 mm area of nodularity having the appearance of a cyst. The  pancreas is satisfactory in appearance.     Both adrenal glands are normal. The native kidneys are small and there  is nephrocalcinosis. There is 2 cm right renal cyst. Right pelvic renal  transplant demonstrates a normal pattern of enhancement, no obstructive  uropathy mass or calculus. There is wall thickening demonstrated along  the right lateral border of the urinary bladder. There is some serosal  spiculation at this location. Cystitis not excluded. Tiny gas bubble is  noted within, has a been recent catheterization?     There is diverticulosis of the colon. No indication of diverticulitis.  Within the midabdomen there is a thick-walled segment of jejunum  measuring approximately 12 cm in length. This consistent with enteritis.  The stomach is collapsed. Duodenal bulb is typical in appearance. There  is some fold thickening and irregularity of the second third and fourth  portions of the duodenum suggestive of duodenitis.     No free air or free intraperitoneal fluid. The uterus is small in size,  appropriate for age, no ovarian abnormality. There are Tarlov cysts.     Conclusion:  1. The liver is grossly abnormal, it  is enlarged with surface  irregularity and heterogenous. The appearance is consistent with  cirrhosis with likely fatty infiltration. Underlying hepatitis or  hepatocellular carcinoma not excluded. There is splenomegaly and  extensive collateral vessel formation consistent with portal  hypertension.  2. There is gallbladder wall thickening and irregularity, possible  acalculus cholecystitis. CBD is dilated at 11 mm with smooth tapering to  the ampulla. No definite stone identified. At this location there  appears to be a partially gas-filled diverticulum.  3. Renal transplant is satisfactory in appearance. There is asymmetric  wall thickening of the right bladder with serosal irregularity/edema  worrisome for cystitis.  4. Long segment of jejunum which demonstrates wall thickening. In  addition there is fold irregularity of the duodenum, findings suggest  enteritis/duodenitis.  5. Diverticulosis of the colon. No diverticulitis.     MRI/MRCP would be helpful as follow-up.        This report was finalized on 12/26/2022 8:09 PM by Dr. Tong Noriega M.D.       XR Chest 1 View [[id number]] Collected: 12/26/22 1922     Updated: 12/26/22 1930    Narrative:      SINGLE VIEW OF THE CHEST     HISTORY: Fever     COMPARISON: None available.     FINDINGS:  Cardiomegaly is present. There is tortuosity of the aorta. No  pneumothorax is seen. There is some blunting the left costophrenic  angle. There are multifocal pulmonary opacities which are noted within  the right upper lobe, concerning for pneumonia. There is some additional  mild consolidation noted at the lung bases bilaterally. This may  represent atelectasis and/or scarring, although additional infiltrate is  not excluded.       Impression:      Multifocal pulmonary opacities, concerning for pneumonia.     This report was finalized on 12/26/2022 7:24 PM by Dr. Jackelyn Merritt M.D.       US Gallbladder [[id number]] Collected: 12/26/22 1857     Updated: 12/26/22 1908     Narrative:      US GALLBLADDER-clinical: Fever with right upper quadrant pain     Renal transplant     COMPARISON: None     FINDINGS: Only a small portion of the pancreatic body could be  visualized and is within normal limits however the majority is obscured.  No pancreatic ductal dilatation seen. No peripancreatic fluid  identified.     There is some coarsening of the overall hepatic echotexture in addition  there is some surface irregularity of the liver, the findings are  worrisome for cirrhosis. No intrahepatic ductal dilatation.     The native right kidney is small, 4 cm in length. The transplant is 11  cm in length. Transplant cortical echotexture is within normal limits,  no obstructive uropathy calculus or mass is demonstrated. No renal RI  calculated.     The gallbladder is satisfactory in appearance. No gallstones or  gallbladder wall thickening nor pericholecystic fluid. CBD is dilated at  12 mm. Pancreatic protocol CT would be the best means for further  evaluation. No free fluid is demonstrated within the upper abdomen.     CONCLUSION: The gallbladder is satisfactory in appearance, no gallstones  seen. CBD diameter however is abnormal at 12 mm. Only small portion of  the pancreatic body could be visualized. Pancreatic protocol CT would be  the best means for further evaluation if possible.     The right renal transplant is satisfactory in appearance. There is  coarsening of the hepatic echotexture with surface irregularity  worrisome for cirrhosis. No free fluid is demonstrated within the right  upper quadrant.     This report was finalized on 12/26/2022 7:05 PM by Dr. Tong Noriega M.D.                   No orders to display        Assessment/Plan     Active Hospital Problems    Diagnosis  POA    **Multifocal pneumonia [J18.9]  Yes    Right upper quadrant abdominal pain [R10.11]  Yes    History of kidney transplant [Z94.0]  Not Applicable      Resolved Hospital Problems   No resolved problems to  display.     Multifocal pneumonia  Chest X ray confirmed   Blood cultures pending   Encourage pulmonary hygiene, turn cough deep breathe, incentive spirometry  Monitor fever-acetaminophen given in ED  Continue Zosyn  Continuous cardiac monitoring and pulse oximetry  Supplemental oxygen as needed for hypoxia/respiratory distress to maintain oxygen saturation greater than 90%.  Azithromycin, and ceftriaxone administered in ED, not continued Zosyn continued    Right upper quadrant abdominal pain  CT Scan reviewed concerning for possible  cirrhosis, acalculous cholecystitis, enterocolitis, and or duodenitis  General surgery consulted  GI consulted   Continue as needed hydromorphone  Hepatitis panel ordered  Monitor CBC, BMP    Acute cystitis  Urinalysis reviewed  Continue Zosyn    History of kidney transplant  Continue home Prograf, CellCept, and nadolol  CT scan reviewed transplant in appropriate position  Avoid nephrotoxic medications, hypovolemia, hypotension  Monitor I's and O's and daily weights    I discussed the patient's findings and my recommendations with patient and spouse.    VTE Prophylaxis - SCDs.  Code Status - Full code.       ERYN Moore  [facility]  12/27/22  02:09 EST

## 2022-12-27 NOTE — CASE MANAGEMENT/SOCIAL WORK
Discharge Planning Assessment  [facility]     Patient Name: Jacey Valdez  MRN: [id number]  Today's Date: 12/27/2022    Admit Date: 12/26/2022    Plan: Home with family   Discharge Needs Assessment     Row Name 12/27/22 1243       Living Environment    People in Home spouse    Current Living Arrangements home    Primary Care Provided by self    Family Caregiver if Needed spouse    Quality of Family Relationships involved;helpful    Able to Return to Prior Arrangements yes       Resource/Environmental Concerns    Resource/Environmental Concerns none       Transition Planning    Patient/Family Anticipates Transition to home with family    Patient/Family Anticipated Services at Transition none    Transportation Anticipated family or friend will provide       Discharge Needs Assessment    Readmission Within the Last 30 Days no previous admission in last 30 days    Equipment Currently Used at Home none    Concerns to be Addressed adjustment to diagnosis/illness    Anticipated Changes Related to Illness none    Equipment Needed After Discharge none               Discharge Plan     Row Name 12/27/22 1244       Plan    Plan Home with family    Patient/Family in Agreement with Plan yes    Plan Comments Spoke with pt at bedside, introduced self and explained CCP role, and confirmed pharmacy and face sheet information. Pt lives with her  Markus in 1 level home with optional basement, 4 steps to enter. She is IADL’s, uses no DME, no HH or SNF history. She plans home with  to transport, no anticipated d/c needs. CCP will follow -Patt WARNERMaurisio              Continued Care and Services - Admitted Since 12/26/2022    Coordination has not been started for this encounter.       Expected Discharge Date and Time     Expected Discharge Date Expected Discharge Time    Dec 29, 2022          Demographic Summary     Row Name 12/27/22 1242       General Information    Admission Type inpatient               Functional Status     Row  Name 12/27/22 1242       Functional Status    Usual Activity Tolerance excellent    Current Activity Tolerance good       Assessment of Health Literacy    Health Literacy Excellent       Functional Status, IADL    Medications independent    Meal Preparation independent    Housekeeping independent    Laundry independent    Shopping independent       Mental Status    General Appearance WDL WDL       Mental Status Summary    Recent Changes in Mental Status/Cognitive Functioning no changes               Psychosocial    No documentation.                Abuse/Neglect    No documentation.                Legal     Row Name 12/27/22 1242       Financial/Legal    Who Manages Finances if Patient Unable                Substance Abuse    No documentation.                Patient Forms    No documentation.                   Patt Templeton RN

## 2022-12-27 NOTE — ED NOTES
Patient states she has had RUQ pain for a few hours today but has not felt well for the last few days. Patient denies n/v/d.

## 2022-12-27 NOTE — PAYOR COMM NOTE
"Jacey Valdez (67 y.o. Female)     ATTN: INITIAL CLINICALS TO REVIEW FOR INPATIENT AUTHORIZATION:  [id number]    PLEASE REPLY TO UR DEPT: [phone],  [phone]    [facility]: NPI [id number]      Date of Birth   1955    Social Security Number       Address   [address] [place] [address]    Home Phone   [phone]    MRN   [id number]       Mandaen   None    Marital Status                               Admission Date   12/26/22    Admission Type   Emergency    Admitting Provider   Sylvia Ceja MD    Attending Provider   Pablo Grove MD    Department, Room/Bed   [address], [address]/1       Discharge Date       Discharge Disposition       Discharge Destination                               Attending Provider: Pablo Grove MD    Allergies: No Known Allergies    Isolation: Enh Drop/Con   Infection: MRSA (12/27/22), COVID (rule out) (12/27/22)   Code Status: CPR    Ht: 180.3 cm (71\")   Wt: 85.5 kg (188 lb 9.6 oz)    Admission Cmt: None   Principal Problem: Multifocal pneumonia [J18.9]                 Active Insurance as of 12/26/2022     Primary Coverage     Payor Plan Insurance Group Employer/Plan Group    [facility] [employer] [id number]     Payor Plan Address Payor Plan Phone Number Payor Plan Fax Number Effective Dates    [address] [phone]  1/1/2022 - None Entered    [place] [address]       Subscriber Name Subscriber Birth Date Member ID       YOHANNES VALDEZ P 2/28/1954 [id number]                 Emergency Contacts      (Rel.) Home Phone Work Phone Mobile Phone    YOHANNES VALDEZ (Spouse) -- -- [phone]               History & Physical      Erika Rodriguez APRN at 12/26/22 1139     Attestation signed by Pablo Grove MD at 12/27/22 7330    I have reviewed this document                      Patient Name:  Jacey Valdez  YOB: 1955  MRN:  [id number]  Admit Date:  " 12/26/2022  Patient Care Team:  Morales Alan MD as PCP - General (Family Medicine)      Subjective   History Present Illness     Chief Complaint   Patient presents with   • Abdominal Pain         History of Present Illness   Ms. Valdez is a 67 y.o. old female with a past medical history that includes polycystic kidney disease status post renal transplant, HTN and tachycardia who presents to [facility] today with complaints of 1 day 10/10 non radiating sharp stabbing right upper quadrant pain with no exacerbating or eliminating factors.  She also endorses some mild nausea. She denies any other acute distress chest pain, shortness of breath, palpitations, lightheadedness, dizziness, syncope, headache, fever, chills, vomiting, diarrhea, constipation, or  symptoms.  Patient does also report chronic sinusitis, with drainage and nonproductive cough.      Initial evaluation in the emergency department revealed  Fever of 101.2, chest x-ray was remarkable for multifocal pneumonia, initially 89% on room air was placed on 2 L nasal cannula with increased oxygen saturation of 97%.  Patient currently on room air with oxygen saturations of 94%.  Normal white count of 7.03, noted decreased hemoglobin 10.6, and hematocrit 33.4 which looks to be close to her baseline, a mildly elevated AST of 37.  Urinalysis positive nitrates, 1+ leukocytes, 6-12 WBCs, 2+ bacteria, with 3-6 squamous epithelial cells.  CT scan remarkable for grossly abnormal liver enlarged with surface irregularities and heterogeneous.  Consistent with cirrhosis.  Splenomegaly with extensive collateral vessel formation consistent with portal hypertension.  There is gallbladder wall thickening and irregularity possible acalculous cholecystitis.  Common bile duct is dilated at 11 mm with smooth tapering to the ampulla.  Asymmetric wall thickening of the right bladder with serosal irregularities consistent with cystitis.  Long segment of  "jejunum demonstrates wall thickening and additional fold irregularities of the duodenum, enteritis versus duodenitis.  And diverticulosis of the colon is noted.  Abdominal ultrasound shows satisfactory appearance of gallbladder with no gallstones seen.,  Bile duct dilation at 12 mm.  Coarsening of hepatic echotexture with surface irregularities consistent with cirrhosis.    Patient will be admitted to the hospitalist group for further evaluation and treatment of right upper quadrant pain, possible cirrhosis, acute cystitis, and other acute and or chronic conditions.  Review of Systems   Constitutional: Positive for appetite change.   HENT: Negative.    Eyes: Negative.    Respiratory: Negative.    Cardiovascular: Negative.    Gastrointestinal: Positive for abdominal pain and nausea.   Endocrine: Negative.    Genitourinary: Negative.    Musculoskeletal: Negative.    Skin: Negative.    Allergic/Immunologic: Negative.    Neurological: Negative.    Hematological: Negative.    Psychiatric/Behavioral: Negative.    All other systems reviewed and are negative.       Personal History     Past Medical History:   Diagnosis Date   • Kidney transplanted    • Right upper quadrant abdominal pain 12/27/2022   • Tachycardia     Had ablation     Past Surgical History:   Procedure Laterality Date   • PARATHYROIDECTOMY      \"All removed but 1/2 of one\"   • TRANSPLANTATION RENAL       History reviewed. No pertinent family history.  Social History     Tobacco Use   • Smoking status: Never   • Smokeless tobacco: Never   Vaping Use   • Vaping Use: Never used   Substance Use Topics   • Alcohol use: Never   • Drug use: Never     No current facility-administered medications on file prior to encounter.     Current Outpatient Medications on File Prior to Encounter   Medication Sig Dispense Refill   • multivitamin with minerals (MULTIVITAMIN ADULT PO) Take 1 tablet by mouth Daily.     • mycophenolate (CELLCEPT) 250 MG capsule Take 250 mg by mouth " Every Morning.     • nadolol (CORGARD) 20 MG tablet Take 20 mg by mouth Daily.     • tacrolimus (PROGRAF) 1 MG capsule Take 1 mg by mouth 2 (Two) Times a Day.       No Known Allergies    Objective    Objective     Vital Signs  Temp:  [97 °F (36.1 °C)-101.2 °F (38.4 °C)] 97.7 °F (36.5 °C)  Heart Rate:  [] 68  Resp:  [16-20] 20  BP: (101-139)/(68-85) 101/68  SpO2:  [89 %-98 %] 92 %  on  Flow (L/min):  [2] 2;   Device (Oxygen Therapy): nasal cannula  Body mass index is 26.3 kg/m².    Physical Exam  Vitals and nursing note reviewed.   Constitutional:       Appearance: Normal appearance.   HENT:      Mouth/Throat:      Mouth: Mucous membranes are dry.   Eyes:      Conjunctiva/sclera: Conjunctivae normal.   Cardiovascular:      Rate and Rhythm: Normal rate and regular rhythm.      Pulses: Normal pulses.      Heart sounds: Normal heart sounds.   Pulmonary:      Effort: Pulmonary effort is normal.      Breath sounds: Normal breath sounds.   Abdominal:      General: There is distension.      Palpations: Abdomen is soft.      Tenderness: There is abdominal tenderness.   Musculoskeletal:         General: Normal range of motion.   Skin:     General: Skin is warm and dry.      Capillary Refill: Capillary refill takes less than 2 seconds.   Neurological:      General: No focal deficit present.      Mental Status: She is alert and oriented to person, place, and time.   Psychiatric:         Mood and Affect: Mood normal.         Behavior: Behavior normal.         Results Review:  I reviewed the patient's new clinical results.  I reviewed the patient's new imaging results and agree with the interpretation.  I reviewed the patient's other test results and agree with the interpretation  I personally viewed and interpreted the patient's EKG/Telemetry data  Discussed with ED provider.    Lab Results (last 24 hours)       Procedure Component Value Units Date/Time    CBC & Differential [[id number]]  (Abnormal) Collected: 12/26/22 8695     Specimen: Blood Updated: 12/26/22 1805    Narrative:      The following orders were created for panel order CBC & Differential.  Procedure                               Abnormality         Status                     ---------                               -----------         ------                     CBC Auto Differential[[id number]]        Abnormal            Final result                 Please view results for these tests on the individual orders.    Comprehensive Metabolic Panel [[id number]]  (Abnormal) Collected: 12/26/22 1755    Specimen: Blood Updated: 12/26/22 1826     Glucose 137 mg/dL      BUN 19 mg/dL      Creatinine 0.91 mg/dL      Sodium 138 mmol/L      Potassium 3.9 mmol/L      Chloride 107 mmol/L      CO2 23.7 mmol/L      Calcium 9.3 mg/dL      Total Protein 6.9 g/dL      Albumin 2.9 g/dL      ALT (SGPT) 29 U/L      AST (SGOT) 37 U/L      Alkaline Phosphatase 89 U/L      Total Bilirubin 0.7 mg/dL      Globulin 4.0 gm/dL      A/G Ratio 0.7 g/dL      BUN/Creatinine Ratio 20.9     Anion Gap 7.3 mmol/L      eGFR 68.4 mL/min/1.73      Comment: National Kidney Foundation and American Society of Nephrology (ASN) Task Force recommended calculation based on the Chronic Kidney Disease Epidemiology Collaboration (CKD-EPI) equation refit without adjustment for race.       Narrative:      GFR Normal >60  Chronic Kidney Disease <60  Kidney Failure <15      Lipase [[id number]]  (Normal) Collected: 12/26/22 1755    Specimen: Blood Updated: 12/26/22 1826     Lipase 36 U/L     CBC Auto Differential [[id number]]  (Abnormal) Collected: 12/26/22 1755    Specimen: Blood Updated: 12/26/22 1805     WBC 7.03 10*3/mm3      RBC 3.99 10*6/mm3      Hemoglobin 10.6 g/dL      Hematocrit 33.4 %      MCV 83.7 fL      MCH 26.6 pg      MCHC 31.7 g/dL      RDW 15.1 %      RDW-SD 45.5 fl      MPV 10.4 fL      Platelets 145 10*3/mm3      Neutrophil % 83.1 %      Lymphocyte % 6.7 %      Monocyte % 9.8 %      Eosinophil % 0.0 %      Basophil  "% 0.1 %      Immature Grans % 0.3 %      Neutrophils, Absolute 5.84 10*3/mm3      Lymphocytes, Absolute 0.47 10*3/mm3      Monocytes, Absolute 0.69 10*3/mm3      Eosinophils, Absolute 0.00 10*3/mm3      Basophils, Absolute 0.01 10*3/mm3      Immature Grans, Absolute 0.02 10*3/mm3      nRBC 0.0 /100 WBC     Lactic Acid, Plasma [[id number]]  (Normal) Collected: 12/26/22 1755    Specimen: Blood Updated: 12/26/22 1818     Lactate 1.0 mmol/L     Blood Culture - Blood, Arm, Right [[id number]] Collected: 12/26/22 1755    Specimen: Blood from Arm, Right Updated: 12/26/22 1800    Blood Culture - Blood, Arm, Left [[id number]] Collected: 12/26/22 1755    Specimen: Blood from Arm, Left Updated: 12/26/22 1800    Procalcitonin [[id number]]  (Abnormal) Collected: 12/26/22 1755    Specimen: Blood Updated: 12/26/22 1833     Procalcitonin 0.26 ng/mL     Narrative:      As a Marker for Sepsis (Non-Neonates):    1. <0.5 ng/mL represents a low risk of severe sepsis and/or septic shock.  2. >2 ng/mL represents a high risk of severe sepsis and/or septic shock.    As a Marker for Lower Respiratory Tract Infections that require antibiotic therapy:    PCT on Admission    Antibiotic Therapy       6-12 Hrs later    >0.5                Strongly Recommended  >0.25 - <0.5        Recommended   0.1 - 0.25          Discouraged              Remeasure/reassess PCT  <0.1                Strongly Discouraged     Remeasure/reassess PCT    As 28 day mortality risk marker: \"Change in Procalcitonin Result\" (>80% or <=80%) if Day 0 (or Day 1) and Day 4 values are available. Refer to http://www.[facility]-pct-calculator.com    Change in PCT <=80%  A decrease of PCT levels below or equal to 80% defines a positive change in PCT test result representing a higher risk for 28-day all-cause mortality of patients diagnosed with severe sepsis for septic shock.    Change in PCT >80%  A decrease of PCT levels of more than 80% defines a negative change in PCT result representing " a lower risk for 28-day all-cause mortality of patients diagnosed with severe sepsis or septic shock.       Urinalysis With Culture If Indicated - Urine, Clean Catch [[id number]]  (Abnormal) Collected: 12/26/22 2239    Specimen: Urine, Clean Catch Updated: 12/26/22 2301     Color, UA Yellow     Appearance, UA Clear     pH, UA <=5.0     Specific Gravity, UA >=1.030     Glucose, UA Negative     Ketones, UA Negative     Bilirubin, UA Negative     Blood, UA Negative     Protein,  mg/dL (2+)     Leuk Esterase, UA Small (1+)     Nitrite, UA Positive     Urobilinogen, UA 1.0 E.U./dL    Narrative:      In absence of clinical symptoms, the presence of pyuria, bacteria, and/or nitrites on the urinalysis result does not correlate with infection.    Urinalysis, Microscopic Only - Urine, Clean Catch [[id number]]  (Abnormal) Collected: 12/26/22 2239    Specimen: Urine, Clean Catch Updated: 12/26/22 2301     RBC, UA None Seen /HPF      WBC, UA 6-12 /HPF      Bacteria, UA 2+ /HPF      Squamous Epithelial Cells, UA 3-6 /HPF      Hyaline Casts, UA 0-2 /LPF      Methodology Manual Light Microscopy    Urine Culture - Urine, Urine, Clean Catch [[id number]] Collected: 12/26/22 2239    Specimen: Urine, Clean Catch Updated: 12/26/22 2301            Imaging Results (Last 24 Hours)       Procedure Component Value Units Date/Time    CT Abdomen Pelvis With Contrast [[id number]] Collected: 12/26/22 1939     Updated: 12/26/22 2012    Narrative:      CT ABDOMEN PELVIS W CONTRAST-     Radiation dose reduction techniques were utilized, including automated  exposure control and exposure modulation based on body size.           Clinical: Right upper quadrant pain, febrile     Correlation with gallbladder ultrasound earlier at 1831 hours.     FINDINGS: There is mild thickening and irregularity of the gallbladder  wall which was not appreciated on the ultrasound examination.  Potentially calculus cholecystitis. CBD is dilated, 11 mm in  diameter.  This smoothly tapers to the ampulla. No stone demonstrated. Possible  small diverticulum at this location measuring 13 mm, partially filled  with gas.     The liver is grossly enlarged and heterogenous in attenuation with  superficial irregularity and a nodular appearance consistent with  cirrhosis. Areas of diminished attenuation likely fatty infiltration.  Underlying diffuse hepatocellular disease or neoplasm possible. The  spleen is enlarged and there is extensive collateral vessel formation  throughout the upper abdomen with recannulization of the umbilical vein  consistent with portal hypertension. Within the right lobe of the liver  there is a 14 mm area of nodularity having the appearance of a cyst. The  pancreas is satisfactory in appearance.     Both adrenal glands are normal. The native kidneys are small and there  is nephrocalcinosis. There is 2 cm right renal cyst. Right pelvic renal  transplant demonstrates a normal pattern of enhancement, no obstructive  uropathy mass or calculus. There is wall thickening demonstrated along  the right lateral border of the urinary bladder. There is some serosal  spiculation at this location. Cystitis not excluded. Tiny gas bubble is  noted within, has a been recent catheterization?     There is diverticulosis of the colon. No indication of diverticulitis.  Within the midabdomen there is a thick-walled segment of jejunum  measuring approximately 12 cm in length. This consistent with enteritis.  The stomach is collapsed. Duodenal bulb is typical in appearance. There  is some fold thickening and irregularity of the second third and fourth  portions of the duodenum suggestive of duodenitis.     No free air or free intraperitoneal fluid. The uterus is small in size,  appropriate for age, no ovarian abnormality. There are Tarlov cysts.     Conclusion:  1. The liver is grossly abnormal, it is enlarged with surface  irregularity and heterogenous. The appearance is  consistent with  cirrhosis with likely fatty infiltration. Underlying hepatitis or  hepatocellular carcinoma not excluded. There is splenomegaly and  extensive collateral vessel formation consistent with portal  hypertension.  2. There is gallbladder wall thickening and irregularity, possible  acalculus cholecystitis. CBD is dilated at 11 mm with smooth tapering to  the ampulla. No definite stone identified. At this location there  appears to be a partially gas-filled diverticulum.  3. Renal transplant is satisfactory in appearance. There is asymmetric  wall thickening of the right bladder with serosal irregularity/edema  worrisome for cystitis.  4. Long segment of jejunum which demonstrates wall thickening. In  addition there is fold irregularity of the duodenum, findings suggest  enteritis/duodenitis.  5. Diverticulosis of the colon. No diverticulitis.     MRI/MRCP would be helpful as follow-up.        This report was finalized on 12/26/2022 8:09 PM by Dr. Tong Noriega M.D.       XR Chest 1 View [[id number]] Collected: 12/26/22 1922     Updated: 12/26/22 1930    Narrative:      SINGLE VIEW OF THE CHEST     HISTORY: Fever     COMPARISON: None available.     FINDINGS:  Cardiomegaly is present. There is tortuosity of the aorta. No  pneumothorax is seen. There is some blunting the left costophrenic  angle. There are multifocal pulmonary opacities which are noted within  the right upper lobe, concerning for pneumonia. There is some additional  mild consolidation noted at the lung bases bilaterally. This may  represent atelectasis and/or scarring, although additional infiltrate is  not excluded.       Impression:      Multifocal pulmonary opacities, concerning for pneumonia.     This report was finalized on 12/26/2022 7:24 PM by Dr. Jackelyn Merritt M.D.       US Gallbladder [[id number]] Collected: 12/26/22 1857     Updated: 12/26/22 1908    Narrative:      US GALLBLADDER-clinical: Fever with right upper quadrant  pain     Renal transplant     COMPARISON: None     FINDINGS: Only a small portion of the pancreatic body could be  visualized and is within normal limits however the majority is obscured.  No pancreatic ductal dilatation seen. No peripancreatic fluid  identified.     There is some coarsening of the overall hepatic echotexture in addition  there is some surface irregularity of the liver, the findings are  worrisome for cirrhosis. No intrahepatic ductal dilatation.     The native right kidney is small, 4 cm in length. The transplant is 11  cm in length. Transplant cortical echotexture is within normal limits,  no obstructive uropathy calculus or mass is demonstrated. No renal RI  calculated.     The gallbladder is satisfactory in appearance. No gallstones or  gallbladder wall thickening nor pericholecystic fluid. CBD is dilated at  12 mm. Pancreatic protocol CT would be the best means for further  evaluation. No free fluid is demonstrated within the upper abdomen.     CONCLUSION: The gallbladder is satisfactory in appearance, no gallstones  seen. CBD diameter however is abnormal at 12 mm. Only small portion of  the pancreatic body could be visualized. Pancreatic protocol CT would be  the best means for further evaluation if possible.     The right renal transplant is satisfactory in appearance. There is  coarsening of the hepatic echotexture with surface irregularity  worrisome for cirrhosis. No free fluid is demonstrated within the right  upper quadrant.     This report was finalized on 12/26/2022 7:05 PM by Dr. Tong Noriega M.D.                   No orders to display        Assessment/Plan     Active Hospital Problems    Diagnosis  POA   • **Multifocal pneumonia [J18.9]  Yes   • Right upper quadrant abdominal pain [R10.11]  Yes   • History of kidney transplant [Z94.0]  Not Applicable      Resolved Hospital Problems   No resolved problems to display.     Multifocal pneumonia  Chest X ray confirmed   Blood cultures  pending   Encourage pulmonary hygiene, turn cough deep breathe, incentive spirometry  Monitor fever-acetaminophen given in ED  Continue Zosyn  Continuous cardiac monitoring and pulse oximetry  Supplemental oxygen as needed for hypoxia/respiratory distress to maintain oxygen saturation greater than 90%.  Azithromycin, and ceftriaxone administered in ED, not continued Zosyn continued    Right upper quadrant abdominal pain  CT Scan reviewed concerning for possible  cirrhosis, acalculous cholecystitis, enterocolitis, and or duodenitis  General surgery consulted  GI consulted   Continue as needed hydromorphone  Hepatitis panel ordered  Monitor CBC, BMP    Acute cystitis  Urinalysis reviewed  Continue Zosyn    History of kidney transplant  Continue home Prograf, CellCept, and nadolol  CT scan reviewed transplant in appropriate position  Avoid nephrotoxic medications, hypovolemia, hypotension  Monitor I's and O's and daily weights    · I discussed the patient's findings and my recommendations with patient and spouse.    VTE Prophylaxis - SCDs.  Code Status - Full code.       ERYN Moore  [facility]  12/27/22  02:09 EST    Electronically signed by Pablo Grove MD at 12/27/22 0925          Emergency Department Notes      Bianca Eller RN at 12/26/22 1636        Pt arrives with assist to triage for right upper quadrant pain under her breast that started around 1530 today. Pt denies any injury. Pt has history of kidney transplant on that side    Electronically signed by Bianca Eller RN at 12/26/22 1638     Felix Dhillon MD at 12/26/22 1704           EMERGENCY DEPARTMENT ENCOUNTER    Room Number:  30/30  Date seen:  12/26/2022  PCP: Morales Alan MD  Historian: Patient and spouse      HPI:  Chief Complaint: Right upper quadrant pain    A complete HPI/ROS/PMH/PSH/SH/FH are unobtainable due to: N/A    Context: Jacey Valdez is a 67 y.o. female who presents to the ED  "c/o sudden onset of right upper quadrant pain which started earlier today.  Pain does not radiate.  She has had minimal nausea but has not had much in way of an appetite for the past few days.  No vomiting or diarrhea.  Urine has been unremarkable.  She has a chronic cough that is unchanged.    Prior history of renal transplant.      PAST MEDICAL HISTORY  Active Ambulatory Problems     Diagnosis Date Noted   • No Active Ambulatory Problems     Resolved Ambulatory Problems     Diagnosis Date Noted   • No Resolved Ambulatory Problems     Past Medical History:   Diagnosis Date   • Kidney transplanted    • Tachycardia          PAST SURGICAL HISTORY  Past Surgical History:   Procedure Laterality Date   • PARATHYROIDECTOMY      \"All removed but 1/2 of one\"   • TRANSPLANTATION RENAL           FAMILY HISTORY  History reviewed. No pertinent family history.      SOCIAL HISTORY  Social History     Socioeconomic History   • Marital status:    Tobacco Use   • Smoking status: Never   • Smokeless tobacco: Never   Vaping Use   • Vaping Use: Never used   Substance and Sexual Activity   • Alcohol use: Never   • Drug use: Never   • Sexual activity: Defer         ALLERGIES  Patient has no known allergies.        REVIEW OF SYSTEMS  Review of Systems   Constitutional: Positive for appetite change, chills and fever.   Respiratory: Positive for cough (Chronic, unchanged). Negative for shortness of breath.    Cardiovascular: Negative for chest pain.   Gastrointestinal: Positive for abdominal pain. Negative for diarrhea and vomiting.   Genitourinary: Negative for difficulty urinating and dysuria.            PHYSICAL EXAM  ED Triage Vitals [12/26/22 1639]   Temp Heart Rate Resp BP SpO2   (!) 101.2 °F (38.4 °C) 100 20 139/85 (!) 89 %      Temp src Heart Rate Source Patient Position BP Location FiO2 (%)   Oral -- -- -- --       Physical Exam      Physical Exam   Constitutional: Pt. is oriented to person, place, and time.  She is " well-developed, well-nourished, and in mild distress.    HENT: Normocephalic and atraumatic. Pupils are equal, round, and reactive to light.   Neck: Normal range of motion. Neck supple. No JVD present. No tracheal deviation present.   Cardiovascular: Normal rate, regular rhythm and normal heart sounds.   Pulmonary/Chest: Effort normal and breath sounds normal. No stridor. No respiratory distress. No wheezes, no rales.   Abdominal: Soft.  No distension. There is moderate right upper quadrant tenderness with positive Mchugh sign. There is no rebound and no guarding.   Musculoskeletal: No edema, tenderness or deformity.   Neurological: She is alert and oriented to person, place, and time.  She has no focal neurologic deficits.  Skin: Skin is warm and dry. Pt. is not diaphoretic.  Psychiatric: Mood, affect normal.  She is pleasant and cooperative.  Nursing note and vitals reviewed.            LAB RESULTS  Recent Results (from the past 24 hour(s))   Comprehensive Metabolic Panel    Collection Time: 12/26/22  5:55 PM    Specimen: Blood   Result Value Ref Range    Glucose 137 (H) 65 - 99 mg/dL    BUN 19 8 - 23 mg/dL    Creatinine 0.91 0.57 - 1.00 mg/dL    Sodium 138 136 - 145 mmol/L    Potassium 3.9 3.5 - 5.2 mmol/L    Chloride 107 98 - 107 mmol/L    CO2 23.7 22.0 - 29.0 mmol/L    Calcium 9.3 8.6 - 10.5 mg/dL    Total Protein 6.9 6.0 - 8.5 g/dL    Albumin 2.9 (L) 3.5 - 5.2 g/dL    ALT (SGPT) 29 1 - 33 U/L    AST (SGOT) 37 (H) 1 - 32 U/L    Alkaline Phosphatase 89 39 - 117 U/L    Total Bilirubin 0.7 0.0 - 1.2 mg/dL    Globulin 4.0 gm/dL    A/G Ratio 0.7 g/dL    BUN/Creatinine Ratio 20.9 7.0 - 25.0    Anion Gap 7.3 5.0 - 15.0 mmol/L    eGFR 68.4 >60.0 mL/min/1.73   Lipase    Collection Time: 12/26/22  5:55 PM    Specimen: Blood   Result Value Ref Range    Lipase 36 13 - 60 U/L   CBC Auto Differential    Collection Time: 12/26/22  5:55 PM    Specimen: Blood   Result Value Ref Range    WBC 7.03 3.40 - 10.80 10*3/mm3    RBC  3.99 3.77 - 5.28 10*6/mm3    Hemoglobin 10.6 (L) 12.0 - 15.9 g/dL    Hematocrit 33.4 (L) 34.0 - 46.6 %    MCV 83.7 79.0 - 97.0 fL    MCH 26.6 26.6 - 33.0 pg    MCHC 31.7 31.5 - 35.7 g/dL    RDW 15.1 12.3 - 15.4 %    RDW-SD 45.5 37.0 - 54.0 fl    MPV 10.4 6.0 - 12.0 fL    Platelets 145 140 - 450 10*3/mm3    Neutrophil % 83.1 (H) 42.7 - 76.0 %    Lymphocyte % 6.7 (L) 19.6 - 45.3 %    Monocyte % 9.8 5.0 - 12.0 %    Eosinophil % 0.0 (L) 0.3 - 6.2 %    Basophil % 0.1 0.0 - 1.5 %    Immature Grans % 0.3 0.0 - 0.5 %    Neutrophils, Absolute 5.84 1.70 - 7.00 10*3/mm3    Lymphocytes, Absolute 0.47 (L) 0.70 - 3.10 10*3/mm3    Monocytes, Absolute 0.69 0.10 - 0.90 10*3/mm3    Eosinophils, Absolute 0.00 0.00 - 0.40 10*3/mm3    Basophils, Absolute 0.01 0.00 - 0.20 10*3/mm3    Immature Grans, Absolute 0.02 0.00 - 0.05 10*3/mm3    nRBC 0.0 0.0 - 0.2 /100 WBC   Lactic Acid, Plasma    Collection Time: 12/26/22  5:55 PM    Specimen: Blood   Result Value Ref Range    Lactate 1.0 0.5 - 2.0 mmol/L   Procalcitonin    Collection Time: 12/26/22  5:55 PM    Specimen: Blood   Result Value Ref Range    Procalcitonin 0.26 (H) 0.00 - 0.25 ng/mL       Ordered the above labs and reviewed the results.        RADIOLOGY  CT Abdomen Pelvis With Contrast    Result Date: 12/26/2022  CT ABDOMEN PELVIS W CONTRAST-  Radiation dose reduction techniques were utilized, including automated exposure control and exposure modulation based on body size.    Clinical: Right upper quadrant pain, febrile  Correlation with gallbladder ultrasound earlier at 1831 hours.  FINDINGS: There is mild thickening and irregularity of the gallbladder wall which was not appreciated on the ultrasound examination. Potentially calculus cholecystitis. CBD is dilated, 11 mm in diameter. This smoothly tapers to the ampulla. No stone demonstrated. Possible small diverticulum at this location measuring 13 mm, partially filled with gas.  The liver is grossly enlarged and heterogenous in  attenuation with superficial irregularity and a nodular appearance consistent with cirrhosis. Areas of diminished attenuation likely fatty infiltration. Underlying diffuse hepatocellular disease or neoplasm possible. The spleen is enlarged and there is extensive collateral vessel formation throughout the upper abdomen with recannulization of the umbilical vein consistent with portal hypertension. Within the right lobe of the liver there is a 14 mm area of nodularity having the appearance of a cyst. The pancreas is satisfactory in appearance.  Both adrenal glands are normal. The native kidneys are small and there is nephrocalcinosis. There is 2 cm right renal cyst. Right pelvic renal transplant demonstrates a normal pattern of enhancement, no obstructive uropathy mass or calculus. There is wall thickening demonstrated along the right lateral border of the urinary bladder. There is some serosal spiculation at this location. Cystitis not excluded. Tiny gas bubble is noted within, has a been recent catheterization?  There is diverticulosis of the colon. No indication of diverticulitis. Within the midabdomen there is a thick-walled segment of jejunum measuring approximately 12 cm in length. This consistent with enteritis. The stomach is collapsed. Duodenal bulb is typical in appearance. There is some fold thickening and irregularity of the second third and fourth portions of the duodenum suggestive of duodenitis.  No free air or free intraperitoneal fluid. The uterus is small in size, appropriate for age, no ovarian abnormality. There are Tarlov cysts.  Conclusion: 1. The liver is grossly abnormal, it is enlarged with surface irregularity and heterogenous. The appearance is consistent with cirrhosis with likely fatty infiltration. Underlying hepatitis or hepatocellular carcinoma not excluded. There is splenomegaly and extensive collateral vessel formation consistent with portal hypertension. 2. There is gallbladder wall  thickening and irregularity, possible acalculus cholecystitis. CBD is dilated at 11 mm with smooth tapering to the ampulla. No definite stone identified. At this location there appears to be a partially gas-filled diverticulum. 3. Renal transplant is satisfactory in appearance. There is asymmetric wall thickening of the right bladder with serosal irregularity/edema worrisome for cystitis. 4. Long segment of jejunum which demonstrates wall thickening. In addition there is fold irregularity of the duodenum, findings suggest enteritis/duodenitis. 5. Diverticulosis of the colon. No diverticulitis.  MRI/MRCP would be helpful as follow-up.   This report was finalized on 12/26/2022 8:09 PM by Dr. Tong Noriega M.D.      US Gallbladder    Result Date: 12/26/2022  US GALLBLADDER-clinical: Fever with right upper quadrant pain  Renal transplant  COMPARISON: None  FINDINGS: Only a small portion of the pancreatic body could be visualized and is within normal limits however the majority is obscured. No pancreatic ductal dilatation seen. No peripancreatic fluid identified.  There is some coarsening of the overall hepatic echotexture in addition there is some surface irregularity of the liver, the findings are worrisome for cirrhosis. No intrahepatic ductal dilatation.  The native right kidney is small, 4 cm in length. The transplant is 11 cm in length. Transplant cortical echotexture is within normal limits, no obstructive uropathy calculus or mass is demonstrated. No renal RI calculated.  The gallbladder is satisfactory in appearance. No gallstones or gallbladder wall thickening nor pericholecystic fluid. CBD is dilated at 12 mm. Pancreatic protocol CT would be the best means for further evaluation. No free fluid is demonstrated within the upper abdomen.  CONCLUSION: The gallbladder is satisfactory in appearance, no gallstones seen. CBD diameter however is abnormal at 12 mm. Only small portion of the pancreatic body could be  visualized. Pancreatic protocol CT would be the best means for further evaluation if possible.  The right renal transplant is satisfactory in appearance. There is coarsening of the hepatic echotexture with surface irregularity worrisome for cirrhosis. No free fluid is demonstrated within the right upper quadrant.  This report was finalized on 12/26/2022 7:05 PM by Dr. Tong Noriega M.D.      XR Chest 1 View    Result Date: 12/26/2022  SINGLE VIEW OF THE CHEST  HISTORY: Fever  COMPARISON: None available.  FINDINGS: Cardiomegaly is present. There is tortuosity of the aorta. No pneumothorax is seen. There is some blunting the left costophrenic angle. There are multifocal pulmonary opacities which are noted within the right upper lobe, concerning for pneumonia. There is some additional mild consolidation noted at the lung bases bilaterally. This may represent atelectasis and/or scarring, although additional infiltrate is not excluded.      Multifocal pulmonary opacities, concerning for pneumonia.  This report was finalized on 12/26/2022 7:24 PM by Dr. Jackelyn Merritt M.D.        Ordered the above noted radiological studies. Reviewed by me in PACS.        PROCEDURES  Procedures      MEDICATIONS GIVEN IN ER  Medications   sodium chloride 0.9 % flush 10 mL (has no administration in time range)   azithromycin (ZITHROMAX) 500 mg in sodium chloride 0.9 % 250 mL IVPB-VTB (500 mg Intravenous Given 12/26/22 2040)   acetaminophen (TYLENOL) tablet 650 mg (650 mg Oral Given 12/26/22 1804)   lactated ringers bolus 1,000 mL (0 mL Intravenous Stopped 12/26/22 1920)   ondansetron (ZOFRAN) injection 4 mg (4 mg Intravenous Given 12/26/22 1804)   HYDROmorphone (DILAUDID) injection 0.5 mg (0.5 mg Intravenous Given 12/26/22 1806)   iopamidol (ISOVUE-300) 61 % injection 100 mL (85 mL Intravenous Given 12/26/22 1931)   cefTRIAXone (ROCEPHIN) 1 g in sodium chloride 0.9 % 100 mL IVPB-VTB (0 g Intravenous Stopped 12/26/22 2039)              MEDICAL DECISION MAKING, PROGRESS, and CONSULTS    All labs have been independently reviewed by me.  All radiology studies have been reviewed by me and discussed with radiologist dictating the report.   EKG's independently viewed and interpreted by me.  Discussion below represents my analysis of pertinent findings related to patient's condition, differential diagnosis, treatment plan and final disposition.      Additional sources:  - Discussed/ obtained information from independent historians:  No    - External (non-ED) record review: She has a history of renal transplant, hypertension, CKD stage I and portal hypertension.    - Chronic or social conditions impacting care: Prior renal transplant.      Orders placed during this visit:  Orders Placed This Encounter   Procedures   • Blood Culture - Blood,   • Blood Culture - Blood,   • US Gallbladder   • XR Chest 1 View   • CT Abdomen Pelvis With Contrast   • Comprehensive Metabolic Panel   • Lipase   • Urinalysis With Culture If Indicated - Urine, Clean Catch   • CBC Auto Differential   • Lactic Acid, Plasma   • Procalcitonin   • NPO Diet NPO Type: Strict NPO   • Cardiac Monitoring   • [facility] (on-call MD unless specified) Details   • Insert Peripheral IV   • Inpatient Admission   • CBC & Differential       Additional orders considered but not ordered:  N/A    Differential diagnosis:  Abd Pain DDx includes but is not limited to: Cholecystitis, choledocholithiasis, cholangitis, peritonitis, pancreatitis/pseudocyst, peptic ulcer, bowel obstruction/ileus, constipation, diverticulitis/perforation/abscess, inflammatory bowel disease, renal colic/stone, urinary tract infection/pyelonephritis, prostatitis, mesenteric ischemia, expanding/leaking AAA, testicular/ovarian torsion.      Independent interpretation of labs, radiology studies, and discussions with consultants:  ED Course as of 12/26/22 2050   Mon Dec 26, 2022   1813 Hemoglobin(!): 10.6 [WC]   1813  Hematocrit(!): 33.4  CBC is otherwise unremarkable [WC]   1827 Lipase: 36 [WC]   1827 AST (SGOT)(!): 37 [WC]   1836 Procalcitonin(!): 0.26 [WC]   1858 Preliminary ultrasound report is negative for cholelithiasis/cholecystitis. [WC]   1933 Chest x-ray was independently visualized by me and discussed with/interpreted by Dr. Merritt (radiology)-it shows multifocal pulmonary opacities concerning for pneumonia. [WC]   2013 CT of the abdomen pelvis was independently visualized by me and discussed with/interpreted by Dr. Noriega (radiology)-there is grossly abnormal liver consistent with cirrhosis/fatty infiltration.  Gallbladder wall is thickened and irregular-?  Acalculous cholecystitis.  Renal transplant appears satisfactory.  There is also possibly duodenitis and enteritis.  For official interpretation, see dictated report [WC]   2014 Case discussed with Dr. Ceja ([facility])-she agrees to admit the patient to telemetry bed. [WC]      ED Course User Index  [WC] Felix Dhillon MD              Appropriate PPE was worn by myself and the patient throughout our entire interaction.    DIAGNOSIS  Final diagnoses:   Multifocal pneumonia   Febrile illness   History of renal transplant         DISPOSITION  Admitted-telemetry            Latest Documented Vital Signs:  As of 20:50 EST  BP- 124/71 HR- 65 Temp- (!) 101.2 °F (38.4 °C) (Oral) O2 sat- 96%        --    Please note that portions of this were completed with a voice recognition program.       Note Disclaimer: At [facility], we believe that sharing information builds trust and better relationships. You are receiving this note because you are receiving care at [facility] or recently visited. It is possible you will see health information before a provider has talked with you about it. This kind of information can be easy to misunderstand. To help you fully understand what it means for your health, we urge you to discuss this note with your provider.          "  Felix Dhillon MD  12/26/22 2050      Electronically signed by Felix Dhillon MD at 12/26/22 2050     Shirley King, RN at 12/26/22 1835        Ultrasound Technician at bedside.    Electronically signed by Shirley King, RN at 12/26/22 1835     Shirley King, RN at 12/26/22 1920        Patient states she has had RUQ pain for a few hours today but has not felt well for the last few days. Patient denies n/v/d.    Electronically signed by Shirley King, RN at 12/26/22 1927     Shirley King, RN at 12/26/22 2059          Nursing report ED to floor  Jacey Valdez  67 y.o.  female    HPI :   Chief Complaint   Patient presents with   • Abdominal Pain       Admitting doctor:   Sylvia Ceja MD    Admitting diagnosis:   The primary encounter diagnosis was Multifocal pneumonia. Diagnoses of Febrile illness and History of renal transplant were also pertinent to this visit.    Code status:   Current Code Status       Date Active Code Status Order ID Comments User Context       Not on file            Allergies:   Patient has no known allergies.    Isolation:   No active isolations    Intake and Output    Intake/Output Summary (Last 24 hours) at 12/26/2022 2059  Last data filed at 12/26/2022 1920  Gross per 24 hour   Intake 1000 ml   Output --   Net 1000 ml       Weight:       12/26/22  1639   Weight: 86.2 kg (190 lb)       Most recent vitals:   Vitals:    12/26/22 1639 12/26/22 1922 12/26/22 2031 12/26/22 2058   BP: 139/85 124/71 118/69    Pulse: 100 65 61 66   Resp: 20 16  16   Temp: (!) 101.2 °F (38.4 °C)   97 °F (36.1 °C)   TempSrc: Oral   Tympanic   SpO2: (!) 89% 96% 98% 97%   Weight: 86.2 kg (190 lb)      Height: 180.3 cm (71\")          Active LDAs/IV Access:   Lines, Drains & Airways       Active LDAs       Name Placement date Placement time Site Days    Peripheral IV 12/26/22 1804 Left Antecubital 12/26/22 1804  Antecubital  less than 1                    Labs (abnormal labs have a star):   Labs " "Reviewed   COMPREHENSIVE METABOLIC PANEL - Abnormal; Notable for the following components:       Result Value    Glucose 137 (*)     Albumin 2.9 (*)     AST (SGOT) 37 (*)     All other components within normal limits    Narrative:     GFR Normal >60  Chronic Kidney Disease <60  Kidney Failure <15     CBC WITH AUTO DIFFERENTIAL - Abnormal; Notable for the following components:    Hemoglobin 10.6 (*)     Hematocrit 33.4 (*)     Neutrophil % 83.1 (*)     Lymphocyte % 6.7 (*)     Eosinophil % 0.0 (*)     Lymphocytes, Absolute 0.47 (*)     All other components within normal limits   PROCALCITONIN - Abnormal; Notable for the following components:    Procalcitonin 0.26 (*)     All other components within normal limits    Narrative:     As a Marker for Sepsis (Non-Neonates):    1. <0.5 ng/mL represents a low risk of severe sepsis and/or septic shock.  2. >2 ng/mL represents a high risk of severe sepsis and/or septic shock.    As a Marker for Lower Respiratory Tract Infections that require antibiotic therapy:    PCT on Admission    Antibiotic Therapy       6-12 Hrs later    >0.5                Strongly Recommended  >0.25 - <0.5        Recommended   0.1 - 0.25          Discouraged              Remeasure/reassess PCT  <0.1                Strongly Discouraged     Remeasure/reassess PCT    As 28 day mortality risk marker: \"Change in Procalcitonin Result\" (>80% or <=80%) if Day 0 (or Day 1) and Day 4 values are available. Refer to http://www.[employer]-pct-calculator.com    Change in PCT <=80%  A decrease of PCT levels below or equal to 80% defines a positive change in PCT test result representing a higher risk for 28-day all-cause mortality of patients diagnosed with severe sepsis for septic shock.    Change in PCT >80%  A decrease of PCT levels of more than 80% defines a negative change in PCT result representing a lower risk for 28-day all-cause mortality of patients diagnosed with severe sepsis or septic shock.      LIPASE - " Normal   LACTIC ACID, PLASMA - Normal   BLOOD CULTURE   BLOOD CULTURE   URINALYSIS W/ CULTURE IF INDICATED   CBC AND DIFFERENTIAL    Narrative:     The following orders were created for panel order CBC & Differential.  Procedure                               Abnormality         Status                     ---------                               -----------         ------                     CBC Auto Differential[[id number]]        Abnormal            Final result                 Please view results for these tests on the individual orders.       EKG:   No orders to display       Meds given in ED:   Medications   sodium chloride 0.9 % flush 10 mL (has no administration in time range)   azithromycin (ZITHROMAX) 500 mg in sodium chloride 0.9 % 250 mL IVPB-VTB (500 mg Intravenous Given 12/26/22 2040)   acetaminophen (TYLENOL) tablet 650 mg (650 mg Oral Given 12/26/22 1804)   lactated ringers bolus 1,000 mL (0 mL Intravenous Stopped 12/26/22 1920)   ondansetron (ZOFRAN) injection 4 mg (4 mg Intravenous Given 12/26/22 1804)   HYDROmorphone (DILAUDID) injection 0.5 mg (0.5 mg Intravenous Given 12/26/22 1806)   iopamidol (ISOVUE-300) 61 % injection 100 mL (85 mL Intravenous Given 12/26/22 1931)   cefTRIAXone (ROCEPHIN) 1 g in sodium chloride 0.9 % 100 mL IVPB-VTB (0 g Intravenous Stopped 12/26/22 2039)       Imaging results:  XR Chest 1 View    Result Date: 12/26/2022  Multifocal pulmonary opacities, concerning for pneumonia.  This report was finalized on 12/26/2022 7:24 PM by Dr. Jackelyn Merritt M.D.       Ambulatory status:   - Stand-by assist    Social issues:   Social History     Socioeconomic History   • Marital status:    Tobacco Use   • Smoking status: Never   • Smokeless tobacco: Never   Vaping Use   • Vaping Use: Never used   Substance and Sexual Activity   • Alcohol use: Never   • Drug use: Never   • Sexual activity: Defer       NIH Stroke Scale:         Shirley King RN  12/26/22 20:59  EST          Electronically signed by Shirley King RN at 12/26/22 2059             Vital Signs (last 2 days)     Date/Time Temp Temp src Pulse Resp BP Patient Position SpO2    12/27/22 1003 -- -- 55 20 -- -- 97    12/27/22 0700 97.5 (36.4) Oral 61 20 129/72 Sitting 94    12/27/22 0302 97.7 (36.5) Oral 67 20 111/80 Sitting 93    12/27/22 0010 97.7 (36.5) Oral 68 20 101/68 Lying 92    12/26/22 2136 97.8 (36.6) Oral 68 20 114/69 Lying 93    12/26/22 2058 97 (36.1) Tympanic 66 16 -- -- 97    12/26/22 2031 -- -- 61 -- 118/69 -- 98    12/26/22 1922 -- -- 65 16 124/71 -- 96    12/26/22 1639 101.2 (38.4) Oral 100 20 139/85 -- 89        Oxygen Therapy (last 2 days)     Date/Time SpO2 Device (Oxygen Therapy) Flow (L/min) Oxygen Concentration (%) ETCO2 (mmHg)    12/27/22 1003 97 nasal cannula 2 -- --    12/27/22 0700 94 nasal cannula 2 -- --    12/27/22 0302 93 -- -- -- --    12/27/22 0010 92 -- -- -- --    12/26/22 2200 -- nasal cannula 2 -- --    12/26/22 2136 93 nasal cannula 2 -- --    12/26/22 2058 97 nasal cannula 2 -- --    12/26/22 2031 98 -- -- -- --    12/26/22 1922 96 nasal cannula 2 -- --    12/26/22 1639 89 room air -- -- --        Intake & Output (last 2 days)       12/25 0701 12/26 0700 12/26 0701 12/27 0700 12/27 0701 12/28 0700    P.O.  360     IV Piggyback  1000     Total Intake(mL/kg)  1360 (15.9)     Urine (mL/kg/hr)  200 250 (0.5)    Total Output  200 250    Net  +1160 -250           Urine Unmeasured Occurrence  2 x 1 x        Lines, Drains & Airways     Active LDAs     Name Placement date Placement time Site Days    Peripheral IV 12/26/22 1804 Left Antecubital 12/26/22  1804  Antecubital  less than 1                  Current Facility-Administered Medications   Medication Dose Route Frequency Provider Last Rate Last Admin   • HYDROmorphone (DILAUDID) injection 0.5 mg  0.5 mg Intravenous Q3H PRN Erika Rodriguez APRN   0.5 mg at 12/27/22 1158   • melatonin tablet 5 mg  5 mg Oral Nightly PRN  Sylvia Ceja MD       • ondansetron (ZOFRAN) tablet 4 mg  4 mg Oral Q6H PRN Sylvia Ceja MD        Or   • ondansetron (ZOFRAN) injection 4 mg  4 mg Intravenous Q6H PRN Sylvia Ceja MD       • piperacillin-tazobactam (ZOSYN) 3.375 g in iso-osmotic dextrose 50 ml (premix)  3.375 g Intravenous Q8H Sylvia Ceja MD   3.375 g at 12/27/22 1158   • sodium chloride 0.9 % flush 10 mL  10 mL Intravenous PRN Felix Dhillon MD       • sodium chloride 0.9 % flush 10 mL  10 mL Intravenous Q12H Sylvia Ceja MD       • sodium chloride 0.9 % flush 10 mL  10 mL Intravenous PRN Sylvia Ceja MD       • sodium chloride 0.9 % infusion 40 mL  40 mL Intravenous PRN Sylvia Ceja MD       • sodium chloride 0.9 % infusion  50 mL/hr Intravenous Continuous [place] MD 50 mL/hr at 12/27/22 1158 50 mL/hr at 12/27/22 1158     Lab Results (last 48 hours)     Procedure Component Value Units Date/Time    Respiratory Panel PCR w/COVID-19(SARS-CoV-2) DULCE/JANI/ANNETTE/PAD/COR/MAD/NELLY In-House, NP Swab in UTM/VTM, 3-4 HR TAT - Swab, Nasopharynx [[id number]] Collected: 12/27/22 1212    Specimen: Swab from Nasopharynx Updated: 12/27/22 1223    Hepatitis Panel, Acute [[id number]]  (Normal) Collected: 12/27/22 0945    Specimen: Blood Updated: 12/27/22 1141     Hepatitis B Surface Ag Non-Reactive     Hep A IgM Non-Reactive     Hep B C IgM Non-Reactive     Hepatitis C Ab Non-Reactive    Narrative:      Results may be falsely decreased if patient taking Biotin.     Basic Metabolic Panel [[id number]]  (Abnormal) Collected: 12/27/22 0945    Specimen: Blood Updated: 12/27/22 1115     Glucose 124 mg/dL      BUN 17 mg/dL      Creatinine 0.85 mg/dL      Sodium 138 mmol/L      Potassium 4.0 mmol/L      Comment: Slight hemolysis detected by analyzer. Results may be affected.        Chloride 107 mmol/L      CO2 24.6 mmol/L      Calcium 8.9 mg/dL      BUN/Creatinine Ratio 20.0     Anion Gap 6.4 mmol/L      eGFR 74.3  mL/min/1.73      Comment: National Kidney Foundation and American Society of Nephrology (ASN) Task Force recommended calculation based on the Chronic Kidney Disease Epidemiology Collaboration (CKD-EPI) equation refit without adjustment for race.       Narrative:      GFR Normal >60  Chronic Kidney Disease <60  Kidney Failure <15      Phosphorus [[id number]]  (Abnormal) Collected: 12/27/22 0945    Specimen: Blood Updated: 12/27/22 1115     Phosphorus 2.3 mg/dL     Magnesium [[id number]]  (Normal) Collected: 12/27/22 0945    Specimen: Blood Updated: 12/27/22 1115     Magnesium 1.8 mg/dL     CBC & Differential [[id number]]  (Abnormal) Collected: 12/27/22 0945    Specimen: Blood Updated: 12/27/22 1055    Narrative:      The following orders were created for panel order CBC & Differential.  Procedure                               Abnormality         Status                     ---------                               -----------         ------                     CBC Auto Differential[[id number]]        Abnormal            Final result                 Please view results for these tests on the individual orders.    CBC Auto Differential [[id number]]  (Abnormal) Collected: 12/27/22 0945    Specimen: Blood Updated: 12/27/22 1055     WBC 3.78 10*3/mm3      RBC 3.58 10*6/mm3      Hemoglobin 9.7 g/dL      Hematocrit 30.4 %      MCV 84.9 fL      MCH 27.1 pg      MCHC 31.9 g/dL      RDW 15.3 %      RDW-SD 47.3 fl      MPV 10.5 fL      Platelets 105 10*3/mm3      Neutrophil % 77.5 %      Lymphocyte % 12.4 %      Monocyte % 9.0 %      Eosinophil % 0.5 %      Basophil % 0.3 %      Immature Grans % 0.3 %      Neutrophils, Absolute 2.93 10*3/mm3      Lymphocytes, Absolute 0.47 10*3/mm3      Monocytes, Absolute 0.34 10*3/mm3      Eosinophils, Absolute 0.02 10*3/mm3      Basophils, Absolute 0.01 10*3/mm3      Immature Grans, Absolute 0.01 10*3/mm3      nRBC 0.0 /100 WBC     Urine Culture - Urine, Urine, Clean Catch [[id number]]  (Normal)  "Collected: 12/26/22 2239    Specimen: Urine, Clean Catch Updated: 12/27/22 0836     Urine Culture No growth    Urinalysis With Culture If Indicated - Urine, Clean Catch [[id number]]  (Abnormal) Collected: 12/26/22 2239    Specimen: Urine, Clean Catch Updated: 12/26/22 2301     Color, UA Yellow     Appearance, UA Clear     pH, UA <=5.0     Specific Gravity, UA >=1.030     Glucose, UA Negative     Ketones, UA Negative     Bilirubin, UA Negative     Blood, UA Negative     Protein,  mg/dL (2+)     Leuk Esterase, UA Small (1+)     Nitrite, UA Positive     Urobilinogen, UA 1.0 E.U./dL    Narrative:      In absence of clinical symptoms, the presence of pyuria, bacteria, and/or nitrites on the urinalysis result does not correlate with infection.    Urinalysis, Microscopic Only - Urine, Clean Catch [[id number]]  (Abnormal) Collected: 12/26/22 2239    Specimen: Urine, Clean Catch Updated: 12/26/22 2301     RBC, UA None Seen /HPF      WBC, UA 6-12 /HPF      Bacteria, UA 2+ /HPF      Squamous Epithelial Cells, UA 3-6 /HPF      Hyaline Casts, UA 0-2 /LPF      Methodology Manual Light Microscopy    Procalcitonin [[id number]]  (Abnormal) Collected: 12/26/22 1755    Specimen: Blood Updated: 12/26/22 1833     Procalcitonin 0.26 ng/mL     Narrative:      As a Marker for Sepsis (Non-Neonates):    1. <0.5 ng/mL represents a low risk of severe sepsis and/or septic shock.  2. >2 ng/mL represents a high risk of severe sepsis and/or septic shock.    As a Marker for Lower Respiratory Tract Infections that require antibiotic therapy:    PCT on Admission    Antibiotic Therapy       6-12 Hrs later    >0.5                Strongly Recommended  >0.25 - <0.5        Recommended   0.1 - 0.25          Discouraged              Remeasure/reassess PCT  <0.1                Strongly Discouraged     Remeasure/reassess PCT    As 28 day mortality risk marker: \"Change in Procalcitonin Result\" (>80% or <=80%) if Day 0 (or Day 1) and Day 4 values are " available. Refer to http://www.[facility]-pct-calculator.com    Change in PCT <=80%  A decrease of PCT levels below or equal to 80% defines a positive change in PCT test result representing a higher risk for 28-day all-cause mortality of patients diagnosed with severe sepsis for septic shock.    Change in PCT >80%  A decrease of PCT levels of more than 80% defines a negative change in PCT result representing a lower risk for 28-day all-cause mortality of patients diagnosed with severe sepsis or septic shock.       Lipase [[id number]]  (Normal) Collected: 12/26/22 1755    Specimen: Blood Updated: 12/26/22 1826     Lipase 36 U/L     Comprehensive Metabolic Panel [[id number]]  (Abnormal) Collected: 12/26/22 1755    Specimen: Blood Updated: 12/26/22 1826     Glucose 137 mg/dL      BUN 19 mg/dL      Creatinine 0.91 mg/dL      Sodium 138 mmol/L      Potassium 3.9 mmol/L      Chloride 107 mmol/L      CO2 23.7 mmol/L      Calcium 9.3 mg/dL      Total Protein 6.9 g/dL      Albumin 2.9 g/dL      ALT (SGPT) 29 U/L      AST (SGOT) 37 U/L      Alkaline Phosphatase 89 U/L      Total Bilirubin 0.7 mg/dL      Globulin 4.0 gm/dL      A/G Ratio 0.7 g/dL      BUN/Creatinine Ratio 20.9     Anion Gap 7.3 mmol/L      eGFR 68.4 mL/min/1.73      Comment: National Kidney Foundation and American Society of Nephrology (ASN) Task Force recommended calculation based on the Chronic Kidney Disease Epidemiology Collaboration (CKD-EPI) equation refit without adjustment for race.       Narrative:      GFR Normal >60  Chronic Kidney Disease <60  Kidney Failure <15      Lactic Acid, Plasma [[id number]]  (Normal) Collected: 12/26/22 1755    Specimen: Blood Updated: 12/26/22 1818     Lactate 1.0 mmol/L     CBC & Differential [[id number]]  (Abnormal) Collected: 12/26/22 1755    Specimen: Blood Updated: 12/26/22 1805    Narrative:      The following orders were created for panel order CBC & Differential.  Procedure                               Abnormality          Status                     ---------                               -----------         ------                     CBC Auto Differential[[id number]]        Abnormal            Final result                 Please view results for these tests on the individual orders.    CBC Auto Differential [[id number]]  (Abnormal) Collected: 12/26/22 1755    Specimen: Blood Updated: 12/26/22 1805     WBC 7.03 10*3/mm3      RBC 3.99 10*6/mm3      Hemoglobin 10.6 g/dL      Hematocrit 33.4 %      MCV 83.7 fL      MCH 26.6 pg      MCHC 31.7 g/dL      RDW 15.1 %      RDW-SD 45.5 fl      MPV 10.4 fL      Platelets 145 10*3/mm3      Neutrophil % 83.1 %      Lymphocyte % 6.7 %      Monocyte % 9.8 %      Eosinophil % 0.0 %      Basophil % 0.1 %      Immature Grans % 0.3 %      Neutrophils, Absolute 5.84 10*3/mm3      Lymphocytes, Absolute 0.47 10*3/mm3      Monocytes, Absolute 0.69 10*3/mm3      Eosinophils, Absolute 0.00 10*3/mm3      Basophils, Absolute 0.01 10*3/mm3      Immature Grans, Absolute 0.02 10*3/mm3      nRBC 0.0 /100 WBC     Blood Culture - Blood, Arm, Right [[id number]] Collected: 12/26/22 1755    Specimen: Blood from Arm, Right Updated: 12/26/22 1800    Blood Culture - Blood, Arm, Left [[id number]] Collected: 12/26/22 1755    Specimen: Blood from Arm, Left Updated: 12/26/22 1800          Imaging Results (Last 48 Hours)     Procedure Component Value Units Date/Time    CT Abdomen Pelvis With Contrast [[id number]] Collected: 12/26/22 1939     Updated: 12/26/22 2012    Narrative:      CT ABDOMEN PELVIS W CONTRAST-     Radiation dose reduction techniques were utilized, including automated  exposure control and exposure modulation based on body size.           Clinical: Right upper quadrant pain, febrile     Correlation with gallbladder ultrasound earlier at 1831 hours.     FINDINGS: There is mild thickening and irregularity of the gallbladder  wall which was not appreciated on the ultrasound examination.  Potentially calculus  cholecystitis. CBD is dilated, 11 mm in diameter.  This smoothly tapers to the ampulla. No stone demonstrated. Possible  small diverticulum at this location measuring 13 mm, partially filled  with gas.     The liver is grossly enlarged and heterogenous in attenuation with  superficial irregularity and a nodular appearance consistent with  cirrhosis. Areas of diminished attenuation likely fatty infiltration.  Underlying diffuse hepatocellular disease or neoplasm possible. The  spleen is enlarged and there is extensive collateral vessel formation  throughout the upper abdomen with recannulization of the umbilical vein  consistent with portal hypertension. Within the right lobe of the liver  there is a 14 mm area of nodularity having the appearance of a cyst. The  pancreas is satisfactory in appearance.     Both adrenal glands are normal. The native kidneys are small and there  is nephrocalcinosis. There is 2 cm right renal cyst. Right pelvic renal  transplant demonstrates a normal pattern of enhancement, no obstructive  uropathy mass or calculus. There is wall thickening demonstrated along  the right lateral border of the urinary bladder. There is some serosal  spiculation at this location. Cystitis not excluded. Tiny gas bubble is  noted within, has a been recent catheterization?     There is diverticulosis of the colon. No indication of diverticulitis.  Within the midabdomen there is a thick-walled segment of jejunum  measuring approximately 12 cm in length. This consistent with enteritis.  The stomach is collapsed. Duodenal bulb is typical in appearance. There  is some fold thickening and irregularity of the second third and fourth  portions of the duodenum suggestive of duodenitis.     No free air or free intraperitoneal fluid. The uterus is small in size,  appropriate for age, no ovarian abnormality. There are Tarlov cysts.     Conclusion:  1. The liver is grossly abnormal, it is enlarged with  surface  irregularity and heterogenous. The appearance is consistent with  cirrhosis with likely fatty infiltration. Underlying hepatitis or  hepatocellular carcinoma not excluded. There is splenomegaly and  extensive collateral vessel formation consistent with portal  hypertension.  2. There is gallbladder wall thickening and irregularity, possible  acalculus cholecystitis. CBD is dilated at 11 mm with smooth tapering to  the ampulla. No definite stone identified. At this location there  appears to be a partially gas-filled diverticulum.  3. Renal transplant is satisfactory in appearance. There is asymmetric  wall thickening of the right bladder with serosal irregularity/edema  worrisome for cystitis.  4. Long segment of jejunum which demonstrates wall thickening. In  addition there is fold irregularity of the duodenum, findings suggest  enteritis/duodenitis.  5. Diverticulosis of the colon. No diverticulitis.     MRI/MRCP would be helpful as follow-up.        This report was finalized on 12/26/2022 8:09 PM by Dr. Tong Noriega M.D.       XR Chest 1 View [[id number]] Collected: 12/26/22 1922     Updated: 12/26/22 1930    Narrative:      SINGLE VIEW OF THE CHEST     HISTORY: Fever     COMPARISON: None available.     FINDINGS:  Cardiomegaly is present. There is tortuosity of the aorta. No  pneumothorax is seen. There is some blunting the left costophrenic  angle. There are multifocal pulmonary opacities which are noted within  the right upper lobe, concerning for pneumonia. There is some additional  mild consolidation noted at the lung bases bilaterally. This may  represent atelectasis and/or scarring, although additional infiltrate is  not excluded.       Impression:      Multifocal pulmonary opacities, concerning for pneumonia.     This report was finalized on 12/26/2022 7:24 PM by Dr. Jackelyn Merritt M.D.       US Gallbladder [[id number]] Collected: 12/26/22 1857     Updated: 12/26/22 1908    Narrative:      US  GALLBLADDER-clinical: Fever with right upper quadrant pain     Renal transplant     COMPARISON: None     FINDINGS: Only a small portion of the pancreatic body could be  visualized and is within normal limits however the majority is obscured.  No pancreatic ductal dilatation seen. No peripancreatic fluid  identified.     There is some coarsening of the overall hepatic echotexture in addition  there is some surface irregularity of the liver, the findings are  worrisome for cirrhosis. No intrahepatic ductal dilatation.     The native right kidney is small, 4 cm in length. The transplant is 11  cm in length. Transplant cortical echotexture is within normal limits,  no obstructive uropathy calculus or mass is demonstrated. No renal RI  calculated.     The gallbladder is satisfactory in appearance. No gallstones or  gallbladder wall thickening nor pericholecystic fluid. CBD is dilated at  12 mm. Pancreatic protocol CT would be the best means for further  evaluation. No free fluid is demonstrated within the upper abdomen.     CONCLUSION: The gallbladder is satisfactory in appearance, no gallstones  seen. CBD diameter however is abnormal at 12 mm. Only small portion of  the pancreatic body could be visualized. Pancreatic protocol CT would be  the best means for further evaluation if possible.     The right renal transplant is satisfactory in appearance. There is  coarsening of the hepatic echotexture with surface irregularity  worrisome for cirrhosis. No free fluid is demonstrated within the right  upper quadrant.     This report was finalized on 12/26/2022 7:05 PM by Dr. Tong Noriega M.D.           ECG/EMG Results (last 48 hours)     Procedure Component Value Units Date/Time    SCANNED - TELEMETRY   [[id number]] Resulted: 12/26/22     Updated: 12/27/22 1042          Orders (last 48 hrs)      Start     Ordered    12/28/22 0001  NPO Diet NPO Type: Strict NPO  Diet Effective Midnight,   Status:  Canceled         12/27/22  0212    12/27/22 1303  Diet: Liquid Diets; Clear Liquid; Texture: Regular Texture (IDDSI 7); Fluid Consistency: Thin (IDDSI 0)  Diet Effective Now         12/27/22 1302    12/27/22 1130  sodium chloride 0.9 % infusion  Continuous         12/27/22 1033    12/27/22 1034  Respiratory Panel PCR w/COVID-19(SARS-CoV-2) DULCE/JANI/ANNETTE/PAD/COR/MAD/NELLY In-House, NP Swab in UTM/VTM, 3-4 HR TAT - Swab, Nasopharynx  Once         12/27/22 1033    12/27/22 0800  Oral Care  2 Times Daily       12/26/22 2153    12/27/22 0718  NPO Diet NPO Type: Strict NPO  Diet Effective ____,   Status:  Canceled         12/27/22 0717    12/27/22 0600  CBC Auto Differential  PROCEDURE ONCE,   Status:  Canceled         12/26/22 2204 12/27/22 0432  piperacillin-tazobactam (ZOSYN) 3.375 g in iso-osmotic dextrose 50 ml (premix)  Every 8 Hours         12/26/22 2149    12/27/22 0318  Inpatient Infection Control Nurse Consult  Once        Provider:  (Not yet assigned)    12/27/22 0317    12/27/22 0306  HYDROmorphone (DILAUDID) injection 0.5 mg  Every 3 Hours PRN         12/27/22 0306    12/27/22 0213  Inpatient Gastroenterology Consult  Once        Specialty:  Gastroenterology  Provider:  Kristin Thao MD    12/27/22 0212    12/27/22 0213  Code Status and Medical Interventions:  Continuous         12/27/22 0213    12/27/22 0148  Hepatitis Panel, Acute  Once         12/27/22 0147    12/27/22 0000  Vital Signs  Every 4 Hours       12/26/22 2153    12/26/22 2302  Urine Culture - Urine, Urine, Clean Catch  Once         12/26/22 2301    12/26/22 2248  Urinalysis, Microscopic Only - Urine, Clean Catch  Once         12/26/22 2247    12/26/22 2245  sodium chloride 0.9 % flush 10 mL  Every 12 Hours Scheduled         12/26/22 2153    12/26/22 2219  Diet: Cardiac Diets; Healthy Heart (2-3 Na+); Texture: Regular Texture (IDDSI 7); Fluid Consistency: Thin (IDDSI 0)  Diet Effective Now,   Status:  Canceled         12/26/22 2218 12/26/22 2154  Basic Metabolic  Panel  Daily       12/26/22 2153 12/26/22 2154  CBC & Differential  Daily       12/26/22 2153 12/26/22 2154  Magnesium  Daily       12/26/22 2153 12/26/22 2154  Phosphorus  Daily       12/26/22 2153 12/26/22 2154  CBC Auto Differential  PROCEDURE ONCE         12/26/22 2153 12/26/22 2153  melatonin tablet 5 mg  Nightly PRN         12/26/22 2153 12/26/22 2153  ondansetron (ZOFRAN) tablet 4 mg  Every 6 Hours PRN        See Hyperspace for full Linked Orders Report.    12/26/22 2153 12/26/22 2153  ondansetron (ZOFRAN) injection 4 mg  Every 6 Hours PRN        See Hyperspace for full Linked Orders Report.    12/26/22 2153 12/26/22 2153  Intake & Output  Every Shift       12/26/22 2153 12/26/22 2153  Weigh Patient  Once         12/26/22 2153 12/26/22 2153  Oxygen Therapy- Nasal Cannula; Titrate for SPO2: 90% - 95%  Continuous         12/26/22 2153 12/26/22 2153  Insert Peripheral IV  Once         12/26/22 2153 12/26/22 2153  Saline Lock & Maintain IV Access  Continuous         12/26/22 2153 12/26/22 2153  Place Sequential Compression Device  Once         12/26/22 2153 12/26/22 2153  Maintain Sequential Compression Device  Continuous         12/26/22 2153 12/26/22 2152  acetaminophen (TYLENOL) tablet 650 mg  Every 4 Hours PRN,   Status:  Discontinued        See Hyperspace for full Linked Orders Report.    12/26/22 2153 12/26/22 2152  acetaminophen (TYLENOL) 160 MG/5ML solution 650 mg  Every 4 Hours PRN,   Status:  Discontinued        See Hyperspace for full Linked Orders Report.    12/26/22 2153 12/26/22 2152  acetaminophen (TYLENOL) suppository 650 mg  Every 4 Hours PRN,   Status:  Discontinued        See Hyperspace for full Linked Orders Report.    12/26/22 2153 12/26/22 2152  sodium chloride 0.9 % flush 10 mL  As Needed         12/26/22 2153 12/26/22 2152  sodium chloride 0.9 % infusion 40 mL  As Needed         12/26/22 2153 12/26/22 2151  piperacillin-tazobactam  (ZOSYN) 3.375 g in iso-osmotic dextrose 50 ml (premix)  Once         12/26/22 2149 12/26/22 2148  Inpatient General Surgery Consult  Once        Specialty:  General Surgery  Provider:  (Not yet assigned)    12/26/22 2149 12/26/22 2038  Inpatient Admission  Once         12/26/22 2037 12/26/22 2038  Cardiac Monitoring  Continuous        Comments: Follow Standing Orders As Outlined in Process Instructions (Open Order Report to View Full Instructions)    12/26/22 2037 12/26/22 1954  azithromycin (ZITHROMAX) 500 mg in sodium chloride 0.9 % 250 mL IVPB-VTB  Once         12/26/22 1952 12/26/22 1954  cefTRIAXone (ROCEPHIN) 1 g in sodium chloride 0.9 % 100 mL IVPB-VTB  Once         12/26/22 1952 12/1953  [facility] (on-call MD unless specified) Details  Once        Specialty:  Hospitalist  Provider:  (Not yet assigned)    12/1953 12/26/22 1932  iopamidol (ISOVUE-300) 61 % injection 100 mL  Once in Imaging         12/26/22 1930    12/26/22 1915  CT Abdomen Pelvis With Contrast  1 Time Imaging        Comments: IV CONTRAST ONLY      12/26/22 1914    12/26/22 1837  XR Chest 1 View  1 Time Imaging         12/26/22 1836    12/26/22 1725  acetaminophen (TYLENOL) tablet 650 mg  Once         12/26/22 1723    12/26/22 1725  lactated ringers bolus 1,000 mL  Once         12/26/22 1723    12/26/22 1725  ondansetron (ZOFRAN) injection 4 mg  Once         12/26/22 1723    12/26/22 1725  HYDROmorphone (DILAUDID) injection 0.5 mg  Once         12/26/22 1723    12/26/22 1724  US Gallbladder  1 Time Imaging         12/26/22 1723    12/26/22 1716  acetaminophen (TYLENOL) tablet 1,000 mg  Once,   Status:  Discontinued         12/26/22 1714    12/26/22 1715  CBC & Differential  Once         12/26/22 1714    12/26/22 1715  Comprehensive Metabolic Panel  Once         12/26/22 1714    12/26/22 1715  Lipase  Once         12/26/22 1714    12/26/22 1715  Urinalysis With Culture If Indicated - Urine, Clean Catch  Once          12/26/22 1714    12/26/22 1715  CBC Auto Differential  PROCEDURE ONCE         12/26/22 1714    12/26/22 1715  NPO Diet NPO Type: Strict NPO  Diet Effective Now,   Status:  Canceled         12/26/22 1714    12/26/22 1715  Insert Peripheral IV  Once        See Hyperspace for full Linked Orders Report.    12/26/22 1714    12/26/22 1715  Lactic Acid, Plasma  Once         12/26/22 1714    12/26/22 1715  Blood Culture - Blood, Arm, Right  Once         12/26/22 1714    12/26/22 1715  Blood Culture - Blood, Arm, Left  Once         12/26/22 1714    12/26/22 1715  Procalcitonin  Once         12/26/22 1714    12/26/22 1714  sodium chloride 0.9 % flush 10 mL  As Needed        See Hyperspace for full Linked Orders Report.    12/26/22 1714    --  multivitamin with minerals (MULTIVITAMIN ADULT PO)  Daily         12/26/22 1958    --  nadolol (CORGARD) 20 MG tablet  Daily         12/26/22 1958    --  mycophenolate (CELLCEPT) 250 MG capsule  Every Morning         12/26/22 1958    --  tacrolimus (PROGRAF) 1 MG capsule  2 Times Daily         12/26/22 1958    Pending  Inpatient Consult to Advance Care Planning  Once        Provider:  (Not yet assigned)    Pending    --  SCANNED - TELEMETRY           12/26/22 0000                     Consult Notes (last 48 hours)      Roberto Bhatia MD at 12/27/22 1256      Consult Orders    1. Inpatient General Surgery Consult [[id number]] ordered by Sylvia Ceja MD at 12/26/22 9598               General Surgery H&P/Consultation      Impression/Plan: 67-year-old lady with history of renal transplant for polycystic kidney disease, cirrhosis with portal hypertension admitted with concern for acalculous cholecystitis.  She does have a question of duodenitis on the CT scan but based on her pain and fever I think acalculus cholecystitis is certainly a possibility.  I recommended continuing antibiotic therapy.    Unfortunately given her advanced cirrhosis a HIDA scan would probably not be much  "utility here.     If her symptoms do not improve or she has clinical worsening then she will require cholecystostomy tube given she has acalculus cholecystitis and that she is not a candidate for cholecystectomy at this time secondary to her advanced cirrhosis.  Okay for clear liquid diet today.    CC: Abdominal pain    HPI:   Miss Jacey Valdez is a 67 y.o. female that presented to the hospital with 10/10 abdominal pain in the right upper quadrant.  She reports the pain came on all of a sudden although she had been having malaise and a cough for several days.  In the emergency room she was noted to have multifocal pneumonia on an x-ray.  An ultrasound was done which did not show evidence of cholelithiasis.  Subsequent CT scan did show some irregularity of the gallbladder but also duodenitis as well as thickening of the jejunum consistent with enteritis.  She reports that her pain is improved today but has not fully subsided.  She does report that she is hungry for liquids.  She is aware of her history of cirrhosis and states this is related to medication she received following her transplant.  She denies any history of hepatic encephalopathy or ascites.  She does report history of esophageal varices.    Past Medical History:   Past Medical History:   Diagnosis Date   • Acute cystitis without hematuria 12/27/2022   • Kidney transplanted    • Right upper quadrant abdominal pain 12/27/2022   • Tachycardia     Had ablation       Past Surgical History:   Past Surgical History:   Procedure Laterality Date   • PARATHYROIDECTOMY      \"All removed but 1/2 of one\"   • TRANSPLANTATION RENAL         Medications:  Medications Prior to Admission   Medication Sig Dispense Refill Last Dose   • multivitamin with minerals (MULTIVITAMIN ADULT PO) Take 1 tablet by mouth Daily.   12/26/2022   • mycophenolate (CELLCEPT) 250 MG capsule Take 250 mg by mouth Every Morning.   12/26/2022   • nadolol (CORGARD) 20 MG tablet Take 20 mg by mouth " Daily.   12/26/2022   • tacrolimus (PROGRAF) 1 MG capsule Take 1 mg by mouth 2 (Two) Times a Day.   12/26/2022       Allergies: No Known Allergies    Social History:   Social History     Socioeconomic History   • Marital status:    Tobacco Use   • Smoking status: Never   • Smokeless tobacco: Never   Vaping Use   • Vaping Use: Never used   Substance and Sexual Activity   • Alcohol use: Never   • Drug use: Never   • Sexual activity: Defer       Family History:   History reviewed. No pertinent family history.    Review of Systems:  A comprehensive review of systems was negative except for the following positives: Abdominal pain    Physical Exam:   Vitals:    12/27/22 1003   BP:    Pulse: 55   Resp: 20   Temp:    SpO2: 97%     BMI: Body mass index is 26.3 kg/m².   GENERAL: no acute distress, awake and alert  HEENT: normocephalic, atraumatic, no scleral icterus, moist mucous membranes  NECK: Supple, there is no thyromegaly or lymphadenopathy  RESPIRATORY: symmetric excursion bilaterally, normal work of breathing, no wheezes  CARDIOVASCULAR: regular rate, well perfused  GASTROINTESTINAL: Soft, mild tenderness in the right upper quadrant under the costal margin  MUSCULOSKELETAL: no cyanosis, clubbing, or edema  NEUROLOGIC: alert and oriented, normal speech, cranial nerves 2-12 grossly intact, no focal deficits  SKIN: Moist, warm, no rashes, no jaundice      Pertinent labs:   Results from last 7 days   Lab Units 12/27/22  0945 12/26/22  1755   WBC 10*3/mm3 3.78 7.03   HEMOGLOBIN g/dL 9.7* 10.6*   HEMATOCRIT % 30.4* 33.4*   PLATELETS 10*3/mm3 105* 145     Results from last 7 days   Lab Units 12/27/22  0945 12/26/22  1755   SODIUM mmol/L 138 138   POTASSIUM mmol/L 4.0 3.9   CHLORIDE mmol/L 107 107   CO2 mmol/L 24.6 23.7   BUN mg/dL 17 19   CREATININE mg/dL 0.85 0.91   CALCIUM mg/dL 8.9 9.3   BILIRUBIN mg/dL  --  0.7   ALK PHOS U/L  --  89   ALT (SGPT) U/L  --  29   AST (SGOT) U/L  --  37*   GLUCOSE mg/dL 124* 137*        IMAGING:  CT abdomen pelvis and ultrasound reviewed.  No evidence of cholelithiasis with questionable cholecystitis.          Roberto Bhatia MD  General and Endoscopic Surgery  [facility] [facility]    [address], [address]  [place] [address]  P: [phone]  F: [phone]       Electronically signed by Roberto Bhatia MD at 12/27/22 1301     Patt Wilkins MD at 12/27/22 0898      Consult Orders    1. Inpatient Gastroenterology Consult [[id number]] ordered by Erika Rodriguez APRN at 12/27/22 0212               [facility]  Initial Inpatient Consult Note    Referring Provider: Abnormal CT abdomen    Reason for Consultation: [facility]    Subjective     History of present illness:      Thank you for requesting my opinion.    67-year-old woman with a history of polycystic kidney disease status postrenal transplant on immunosuppression admitted to the ER yesterday with complaints of acute onset right upper quadrant pain.  Gastroenterology is consulted for abnormal CT imaging of the abdomen.    She states she has felt poorly for the past few days with malaise, fatigue and poor appetite.  Yesterday, while moving out of bed, she developed acute onset of sharp right-sided abdominal pain.  She said it was greater than 10 out of 10 in severity.  Pain has reduced but she is  to palpation up under the rib cage in the right upper quadrant.    CT imaging notes mild thickening and irregularity of the gallbladder with the radiologist raising concern for a calculus cholecystitis..  Nodular appearance of liver consistent with cirrhosis, splenomegaly with collateral vessel formation consistent with portal hypertension.  Liver cyst.  Colonic diverticulosis.  Thick-walled segment of jejunum consistent with enteritis.  Full thickening and irregularity of the duodenum suggesting duodenitis.    She is now on antibiotics for multifocal pneumonia findings on yesterday's chest  "x-ray.  Blood and urine cultures are pending.    Review of her chart indicates that she has known cirrhosis since 2017, she has had a liver biopsy, and this is thought to be associated with her polycystic disease.  She is followed by Dr. Hernandez with [facility] and has most recently seen his nurse practitioner though she is due for follow-up.  Her last EGD was July 2021 and notable for small esophageal varices.    Labs are notable for a mildly elevated AST level.        Past Medical History:  Past Medical History:   Diagnosis Date   • Acute cystitis without hematuria 12/27/2022   • Kidney transplanted    • Right upper quadrant abdominal pain 12/27/2022   • Tachycardia     Had ablation       Past Surgical History:  Past Surgical History:   Procedure Laterality Date   • PARATHYROIDECTOMY      \"All removed but 1/2 of one\"   • TRANSPLANTATION RENAL          Social History:   Social History     Tobacco Use   • Smoking status: Never   • Smokeless tobacco: Never   Substance Use Topics   • Alcohol use: Never        Family History:  History reviewed. No pertinent family history.    Home Meds:  Medications Prior to Admission   Medication Sig Dispense Refill Last Dose   • multivitamin with minerals (MULTIVITAMIN ADULT PO) Take 1 tablet by mouth Daily.   12/26/2022   • mycophenolate (CELLCEPT) 250 MG capsule Take 250 mg by mouth Every Morning.   12/26/2022   • nadolol (CORGARD) 20 MG tablet Take 20 mg by mouth Daily.   12/26/2022   • tacrolimus (PROGRAF) 1 MG capsule Take 1 mg by mouth 2 (Two) Times a Day.   12/26/2022       Current Meds:   piperacillin-tazobactam, 3.375 g, Intravenous, Q8H  sodium chloride, 10 mL, Intravenous, Q12H        Allergies:  No Known Allergies    Review of Systems  All systems were reviewed and negative except for:  Constitution:  positive for anorexia, fatigue and malaise  Gastrointestinal: positive for  pain     Objective     Vital Signs  Temp:  [97 °F (36.1 °C)-101.2 °F (38.4 °C)] " 97.5 °F (36.4 °C)  Heart Rate:  [] 61  Resp:  [16-20] 20  BP: (101-139)/(68-85) 129/72    Physical Exam:  Constitutional:   Alert, cooperative, in no acute distress, appears stated age   Eyes:           Lids and lashes normal, conjunctivae and sclerae normal,   no icterus   Ears, nose, mouth and throat:  Normal appearance of external ears and nose, no oral l  lesions, no thrush, oral mucosa moist   Respiratory:    Clear to auscultation, respirations regular, even and             Unlabored on nasal cannula    Cardiovascular:   Regular rhythm and normal rate, normal S1 and S2, no        murmur, no gallop, palpable distal pulses, no lower extremity edema   Gastrointestinal:    Soft, nondistended, mildly tender to palpation, no guarding, no rebound tenderness, normal bowel sounds, no palpable masses or organomegaly  Rectal exam: deferred   Musculoskeletal:  Normal station, no atrophy, no tenderness to palpation, normal digits and nails   Skin:  Normal color, no bleeding, bruising, rashes or lesions   Lymphatics:  No palpable cervical or supraclavicular adenopathy   Psychiatric:  Judgement and insight: normal   Orientation to person, place and time: normal   Mood and affect: normal       Results Review:   I reviewed the patient's new clinical results.    Results from last 7 days   Lab Units 12/26/22  1755   WBC 10*3/mm3 7.03   HEMOGLOBIN g/dL 10.6*   HEMATOCRIT % 33.4*   PLATELETS 10*3/mm3 145       Results from last 7 days   Lab Units 12/26/22  1755   SODIUM mmol/L 138   POTASSIUM mmol/L 3.9   CHLORIDE mmol/L 107   CO2 mmol/L 23.7   BUN mg/dL 19   CREATININE mg/dL 0.91   CALCIUM mg/dL 9.3   BILIRUBIN mg/dL 0.7   ALK PHOS U/L 89   ALT (SGPT) U/L 29   AST (SGOT) U/L 37*   GLUCOSE mg/dL 137*             Lab Results   Lab Value Date/Time    LIPASE 36 12/26/2022 1755    LIPASE 32 07/28/2021 1137       Radiology:  Imaging Results (Last 72 Hours)     Procedure Component Value Units Date/Time    CT Abdomen Pelvis With  Contrast [[id number]] Collected: 12/26/22 1939     Updated: 12/26/22 2012    Narrative:      CT ABDOMEN PELVIS W CONTRAST-     Radiation dose reduction techniques were utilized, including automated  exposure control and exposure modulation based on body size.           Clinical: Right upper quadrant pain, febrile     Correlation with gallbladder ultrasound earlier at 1831 hours.     FINDINGS: There is mild thickening and irregularity of the gallbladder  wall which was not appreciated on the ultrasound examination.  Potentially calculus cholecystitis. CBD is dilated, 11 mm in diameter.  This smoothly tapers to the ampulla. No stone demonstrated. Possible  small diverticulum at this location measuring 13 mm, partially filled  with gas.     The liver is grossly enlarged and heterogenous in attenuation with  superficial irregularity and a nodular appearance consistent with  cirrhosis. Areas of diminished attenuation likely fatty infiltration.  Underlying diffuse hepatocellular disease or neoplasm possible. The  spleen is enlarged and there is extensive collateral vessel formation  throughout the upper abdomen with recannulization of the umbilical vein  consistent with portal hypertension. Within the right lobe of the liver  there is a 14 mm area of nodularity having the appearance of a cyst. The  pancreas is satisfactory in appearance.     Both adrenal glands are normal. The native kidneys are small and there  is nephrocalcinosis. There is 2 cm right renal cyst. Right pelvic renal  transplant demonstrates a normal pattern of enhancement, no obstructive  uropathy mass or calculus. There is wall thickening demonstrated along  the right lateral border of the urinary bladder. There is some serosal  spiculation at this location. Cystitis not excluded. Tiny gas bubble is  noted within, has a been recent catheterization?     There is diverticulosis of the colon. No indication of diverticulitis.  Within the midabdomen there is a  thick-walled segment of jejunum  measuring approximately 12 cm in length. This consistent with enteritis.  The stomach is collapsed. Duodenal bulb is typical in appearance. There  is some fold thickening and irregularity of the second third and fourth  portions of the duodenum suggestive of duodenitis.     No free air or free intraperitoneal fluid. The uterus is small in size,  appropriate for age, no ovarian abnormality. There are Tarlov cysts.     Conclusion:  1. The liver is grossly abnormal, it is enlarged with surface  irregularity and heterogenous. The appearance is consistent with  cirrhosis with likely fatty infiltration. Underlying hepatitis or  hepatocellular carcinoma not excluded. There is splenomegaly and  extensive collateral vessel formation consistent with portal  hypertension.  2. There is gallbladder wall thickening and irregularity, possible  acalculus cholecystitis. CBD is dilated at 11 mm with smooth tapering to  the ampulla. No definite stone identified. At this location there  appears to be a partially gas-filled diverticulum.  3. Renal transplant is satisfactory in appearance. There is asymmetric  wall thickening of the right bladder with serosal irregularity/edema  worrisome for cystitis.  4. Long segment of jejunum which demonstrates wall thickening. In  addition there is fold irregularity of the duodenum, findings suggest  enteritis/duodenitis.  5. Diverticulosis of the colon. No diverticulitis.     MRI/MRCP would be helpful as follow-up.        This report was finalized on 12/26/2022 8:09 PM by Dr. Tong Noriega M.D.       XR Chest 1 View [[id number]] Collected: 12/26/22 1922     Updated: 12/26/22 1930    Narrative:      SINGLE VIEW OF THE CHEST     HISTORY: Fever     COMPARISON: None available.     FINDINGS:  Cardiomegaly is present. There is tortuosity of the aorta. No  pneumothorax is seen. There is some blunting the left costophrenic  angle. There are multifocal pulmonary opacities  which are noted within  the right upper lobe, concerning for pneumonia. There is some additional  mild consolidation noted at the lung bases bilaterally. This may  represent atelectasis and/or scarring, although additional infiltrate is  not excluded.       Impression:      Multifocal pulmonary opacities, concerning for pneumonia.     This report was finalized on 12/26/2022 7:24 PM by Dr. Jackelyn Merritt M.D.       US Gallbladder [[id number]] Collected: 12/26/22 1857     Updated: 12/26/22 1908    Narrative:      US GALLBLADDER-clinical: Fever with right upper quadrant pain     Renal transplant     COMPARISON: None     FINDINGS: Only a small portion of the pancreatic body could be  visualized and is within normal limits however the majority is obscured.  No pancreatic ductal dilatation seen. No peripancreatic fluid  identified.     There is some coarsening of the overall hepatic echotexture in addition  there is some surface irregularity of the liver, the findings are  worrisome for cirrhosis. No intrahepatic ductal dilatation.     The native right kidney is small, 4 cm in length. The transplant is 11  cm in length. Transplant cortical echotexture is within normal limits,  no obstructive uropathy calculus or mass is demonstrated. No renal RI  calculated.     The gallbladder is satisfactory in appearance. No gallstones or  gallbladder wall thickening nor pericholecystic fluid. CBD is dilated at  12 mm. Pancreatic protocol CT would be the best means for further  evaluation. No free fluid is demonstrated within the upper abdomen.     CONCLUSION: The gallbladder is satisfactory in appearance, no gallstones  seen. CBD diameter however is abnormal at 12 mm. Only small portion of  the pancreatic body could be visualized. Pancreatic protocol CT would be  the best means for further evaluation if possible.     The right renal transplant is satisfactory in appearance. There is  coarsening of the hepatic echotexture with  surface irregularity  worrisome for cirrhosis. No free fluid is demonstrated within the right  upper quadrant.     This report was finalized on 12/26/2022 7:05 PM by Dr. Tong Noriega M.D.             Assessment & Plan       Multifocal pneumonia    Right upper quadrant abdominal pain    History of kidney transplant    Acute cystitis without hematuria      Impression  1.  Abnormal CT imaging of the abdomen with duodenitis, thickened jejunum    2.  Cirrhosis with portal hypertension: Chronic issue, thought to be related to her polycystic disease    3.  Acute right upper quadrant pain: Concerns for acalculus cholecystitis    4.  History of polycystic kidney disease status postrenal transplant on immunosuppression    5.  Multifocal pneumonia: On antibiotics    Plan  Await general surgery's input regarding acalculus cholecystitis  If there is no concern for a calculus cholecystitis, will plan for EGD to further assess duodenitis seen on CT imaging when cleared by Dr. Grove from her pneumonia standpoint  Will defer diet to general surgery  Advised her and her family to contact Dr. Hernandez's office to arrange for hospital follow-up regarding her cirrhosis; she is established with that group    I discussed the patients findings and my recommendations with patient and family    All necessary PPE, including face mask and eye protection, were worn during this encounter.  Hand sanitization was performed both before and after the patient interaction.    Patt Wilkins MD  [facility], [place], operated by [facility]      Dictated utilizing Dragon dictation      Electronically signed by Patt Wilkins MD at 12/27/22 0936

## 2022-12-27 NOTE — PROGRESS NOTES
[facility]  Clinical Pharmacy Department     Remdesivir Review Note    Jacey Valdez is a 67 y.o. female with confirmed COVID-19 infection on day 1 of hospitalization.     Consulting Provider:  Dr. Grove  Date of Confirmed SARS-CoV-2: 12/27  Date of Symptom Onset: 12/26  Other Antimicrobials: zosyn 3.375g q8h EI  Hydroxychloroquine or chloroquine prior to arrival: no    Allergies  Allergies as of 12/26/2022    (No Known Allergies)       Microbiology:  Microbiology Results (last 10 days)       Procedure Component Value - Date/Time    Respiratory Panel PCR w/COVID-19(SARS-CoV-2) DULCE/JANI/ANNETTE/PAD/COR/MAD/NELLY In-House, NP Swab in UTM/VTM, 3-4 HR TAT - Swab, Nasopharynx [[id number]]  (Abnormal) Collected: 12/27/22 1212    Lab Status: Final result Specimen: Swab from Nasopharynx Updated: 12/27/22 1405     ADENOVIRUS, PCR Not Detected     Coronavirus 229E Not Detected     Coronavirus HKU1 Not Detected     Coronavirus NL63 Not Detected     Coronavirus OC43 Not Detected     COVID19 Detected     Human Metapneumovirus Not Detected     Human Rhinovirus/Enterovirus Not Detected     Influenza A PCR Not Detected     Influenza B PCR Not Detected     Parainfluenza Virus 1 Not Detected     Parainfluenza Virus 2 Not Detected     Parainfluenza Virus 3 Not Detected     Parainfluenza Virus 4 Not Detected     RSV, PCR Not Detected     Bordetella pertussis pcr Not Detected     Bordetella parapertussis PCR Not Detected     Chlamydophila pneumoniae PCR Not Detected     Mycoplasma pneumo by PCR Not Detected    Narrative:      In the setting of a positive respiratory panel with a viral infection PLUS a negative procalcitonin without other underlying concern for bacterial infection, consider observing off antibiotics or discontinuation of antibiotics and continue supportive care. If the respiratory panel is positive for atypical bacterial infection (Bordetella pertussis, Chlamydophila pneumoniae, or Mycoplasma pneumoniae), consider antibiotic  "de-escalation to target atypical bacterial infection.    Urine Culture - Urine, Urine, Clean Catch [[id number]]  (Normal) Collected: 12/26/22 2239    Lab Status: Preliminary result Specimen: Urine, Clean Catch Updated: 12/27/22 0836     Urine Culture No growth            Vitals/Labs/I&O  Visit Vitals  /68 (BP Location: Left arm, Patient Position: Lying)   Pulse 61   Temp 97.3 °F (36.3 °C) (Oral)   Resp 20   Ht 180.3 cm (71\")   Wt 85.5 kg (188 lb 9.6 oz)   SpO2 96%   BMI 26.30 kg/m²       Results from last 7 days   Lab Units 12/27/22  0945 12/26/22  1755   WBC 10*3/mm3 3.78 7.03     Results from last 7 days   Lab Units 12/26/22  1755   PROCALCITONIN ng/mL 0.26*     Results from last 7 days   Lab Units 12/26/22  1755   AST (SGOT) U/L 37*      Results from last 7 days   Lab Units 12/26/22  1755   ALT (SGPT) U/L 29       Estimated Creatinine Clearance: 75.6 mL/min (by C-G formula based on SCr of 0.85 mg/dL).  Results from last 7 days   Lab Units 12/27/22  0945 12/26/22  1755   BUN mg/dL 17 19   CREATININE mg/dL 0.85 0.91     Intake & Output (last 3 days)         12/24 0701  12/25 0700 12/25 0701  12/26 0700 12/26 0701  12/27 0700 12/27 0701  12/28 0700    P.O.   360     IV Piggyback   1000     Total Intake(mL/kg)   1360 (15.9)     Urine (mL/kg/hr)   200 250 (0.4)    Total Output   200 250    Net   +1160 -250            Urine Unmeasured Occurrence   2 x 1 x            Assessment/Plan:    Patient meets the following inclusion/exclusion criteria:  Patient is hospitalized with confirmed COVID-19 infection (and accompanying symptoms)  Patient meets one of the two below criteria:  Patient is requiring an increase in baseline supplemental oxygen requirements OR SpO2 </= 94% on room air secondary to COVID-19 infection OR  Patient is symptomatic but not requiring supplemental oxygen or an increase in baseline oxygen AND has at least one of the below high risk criteria for progression to severe illness. High risk criteria: "   Age >/= 65  Cancer  Cardiovascular diseases (HF, CAD, or cardiomyopathies)  CKD  Chronic lung disease (COPD, CF, interstitial lung disease, PE, pulmonary hypertension, bronchopulmonary dysplasia, bronchiectasis)  Diabetes mellitis (insulin dependent or poorly controlled)  Immunocompromising conditions or receipt of immunosuppressive medications  Obesity (BMI > 35 kg/m2)  Pregnancy and recent pregnancy (within 7 days of delivery)  Sickle cell disease  Baseline and daily LFTs and Scr have been ordered prior to remdesivir initiation  ALT is not ? 10 times the upper limit of normal  Patient is not on concomitant hydroxychloroquine or chloroquine  Patient does not require invasive mechanical ventilation (IMV) or ECMO  Patient is within 10 days from symptom onset (for criteria 2a) or within 7 days of symptom onset (for criteria 2b)    Remdesivir 200 mg IV x 1 dose, followed by 100 mg IV q24h for 4 days or until hospital discharge (whichever comes first) has been ordered.    Thank you for involving pharmacy in this patient's care. Please contact pharmacy with any questions or concerns.                           Jenaro Kamara [facility]  Clinical Pharmacist

## 2022-12-27 NOTE — CONSULTS
General Surgery H&P/Consultation      Impression/Plan: 67-year-old lady with history of renal transplant for polycystic kidney disease, cirrhosis with portal hypertension admitted with concern for acalculous cholecystitis.  She does have a question of duodenitis on the CT scan but based on her pain and fever I think acalculus cholecystitis is certainly a possibility.  I recommended continuing antibiotic therapy.    Unfortunately given her advanced cirrhosis a HIDA scan would probably not be much utility here.     If her symptoms do not improve or she has clinical worsening then she will require cholecystostomy tube given she has acalculus cholecystitis and that she is not a candidate for cholecystectomy at this time secondary to her advanced cirrhosis.  Okay for clear liquid diet today.    CC: Abdominal pain    HPI:   Miss Jacey Valdez is a 67 y.o. female that presented to the hospital with 10/10 abdominal pain in the right upper quadrant.  She reports the pain came on all of a sudden although she had been having malaise and a cough for several days.  In the emergency room she was noted to have multifocal pneumonia on an x-ray.  An ultrasound was done which did not show evidence of cholelithiasis.  Subsequent CT scan did show some irregularity of the gallbladder but also duodenitis as well as thickening of the jejunum consistent with enteritis.  She reports that her pain is improved today but has not fully subsided.  She does report that she is hungry for liquids.  She is aware of her history of cirrhosis and states this is related to medication she received following her transplant.  She denies any history of hepatic encephalopathy or ascites.  She does report history of esophageal varices.    Past Medical History:   Past Medical History:   Diagnosis Date   • Acute cystitis without hematuria 12/27/2022   • Kidney transplanted    • Right upper quadrant abdominal pain 12/27/2022   • Tachycardia     Had ablation  "      Past Surgical History:   Past Surgical History:   Procedure Laterality Date   • PARATHYROIDECTOMY      \"All removed but 1/2 of one\"   • TRANSPLANTATION RENAL         Medications:  Medications Prior to Admission   Medication Sig Dispense Refill Last Dose   • multivitamin with minerals (MULTIVITAMIN ADULT PO) Take 1 tablet by mouth Daily.   12/26/2022   • mycophenolate (CELLCEPT) 250 MG capsule Take 250 mg by mouth Every Morning.   12/26/2022   • nadolol (CORGARD) 20 MG tablet Take 20 mg by mouth Daily.   12/26/2022   • tacrolimus (PROGRAF) 1 MG capsule Take 1 mg by mouth 2 (Two) Times a Day.   12/26/2022       Allergies: No Known Allergies    Social History:   Social History     Socioeconomic History   • Marital status:    Tobacco Use   • Smoking status: Never   • Smokeless tobacco: Never   Vaping Use   • Vaping Use: Never used   Substance and Sexual Activity   • Alcohol use: Never   • Drug use: Never   • Sexual activity: Defer       Family History:   History reviewed. No pertinent family history.    Review of Systems:  A comprehensive review of systems was negative except for the following positives: Abdominal pain    Physical Exam:   Vitals:    12/27/22 1003   BP:    Pulse: 55   Resp: 20   Temp:    SpO2: 97%     BMI: Body mass index is 26.3 kg/m².   GENERAL: no acute distress, awake and alert  HEENT: normocephalic, atraumatic, no scleral icterus, moist mucous membranes  NECK: Supple, there is no thyromegaly or lymphadenopathy  RESPIRATORY: symmetric excursion bilaterally, normal work of breathing, no wheezes  CARDIOVASCULAR: regular rate, well perfused  GASTROINTESTINAL: Soft, mild tenderness in the right upper quadrant under the costal margin  MUSCULOSKELETAL: no cyanosis, clubbing, or edema  NEUROLOGIC: alert and oriented, normal speech, cranial nerves 2-12 grossly intact, no focal deficits  SKIN: Moist, warm, no rashes, no jaundice      Pertinent labs:   Results from last 7 days   Lab Units " 12/27/22  0945 12/26/22  1755   WBC 10*3/mm3 3.78 7.03   HEMOGLOBIN g/dL 9.7* 10.6*   HEMATOCRIT % 30.4* 33.4*   PLATELETS 10*3/mm3 105* 145     Results from last 7 days   Lab Units 12/27/22  0945 12/26/22  1755   SODIUM mmol/L 138 138   POTASSIUM mmol/L 4.0 3.9   CHLORIDE mmol/L 107 107   CO2 mmol/L 24.6 23.7   BUN mg/dL 17 19   CREATININE mg/dL 0.85 0.91   CALCIUM mg/dL 8.9 9.3   BILIRUBIN mg/dL  --  0.7   ALK PHOS U/L  --  89   ALT (SGPT) U/L  --  29   AST (SGOT) U/L  --  37*   GLUCOSE mg/dL 124* 137*       IMAGING:  CT abdomen pelvis and ultrasound reviewed.  No evidence of cholelithiasis with questionable cholecystitis.          Roberto Bhatia MD  General and Endoscopic Surgery  [facility]    [address], [address]  [place] [address]  P: [phone]  F: [phone]

## 2022-12-27 NOTE — ED NOTES
"Nursing report ED to floor  Jacey Valdez  67 y.o.  female    HPI :   Chief Complaint   Patient presents with    Abdominal Pain       Admitting doctor:   Sylvia Ceja MD    Admitting diagnosis:   The primary encounter diagnosis was Multifocal pneumonia. Diagnoses of Febrile illness and History of renal transplant were also pertinent to this visit.    Code status:   Current Code Status       Date Active Code Status Order ID Comments User Context       Not on file            Allergies:   Patient has no known allergies.    Isolation:   No active isolations    Intake and Output    Intake/Output Summary (Last 24 hours) at 12/26/2022 2059  Last data filed at 12/26/2022 1920  Gross per 24 hour   Intake 1000 ml   Output --   Net 1000 ml       Weight:       12/26/22  1639   Weight: 86.2 kg (190 lb)       Most recent vitals:   Vitals:    12/26/22 1639 12/26/22 1922 12/26/22 2031 12/26/22 2058   BP: 139/85 124/71 118/69    Pulse: 100 65 61 66   Resp: 20 16  16   Temp: (!) 101.2 °F (38.4 °C)   97 °F (36.1 °C)   TempSrc: Oral   Tympanic   SpO2: (!) 89% 96% 98% 97%   Weight: 86.2 kg (190 lb)      Height: 180.3 cm (71\")          Active LDAs/IV Access:   Lines, Drains & Airways       Active LDAs       Name Placement date Placement time Site Days    Peripheral IV 12/26/22 1804 Left Antecubital 12/26/22  1804  Antecubital  less than 1                    Labs (abnormal labs have a star):   Labs Reviewed   COMPREHENSIVE METABOLIC PANEL - Abnormal; Notable for the following components:       Result Value    Glucose 137 (*)     Albumin 2.9 (*)     AST (SGOT) 37 (*)     All other components within normal limits    Narrative:     GFR Normal >60  Chronic Kidney Disease <60  Kidney Failure <15     CBC WITH AUTO DIFFERENTIAL - Abnormal; Notable for the following components:    Hemoglobin 10.6 (*)     Hematocrit 33.4 (*)     Neutrophil % 83.1 (*)     Lymphocyte % 6.7 (*)     Eosinophil % 0.0 (*)     Lymphocytes, Absolute 0.47 (*)     All " "other components within normal limits   PROCALCITONIN - Abnormal; Notable for the following components:    Procalcitonin 0.26 (*)     All other components within normal limits    Narrative:     As a Marker for Sepsis (Non-Neonates):    1. <0.5 ng/mL represents a low risk of severe sepsis and/or septic shock.  2. >2 ng/mL represents a high risk of severe sepsis and/or septic shock.    As a Marker for Lower Respiratory Tract Infections that require antibiotic therapy:    PCT on Admission    Antibiotic Therapy       6-12 Hrs later    >0.5                Strongly Recommended  >0.25 - <0.5        Recommended   0.1 - 0.25          Discouraged              Remeasure/reassess PCT  <0.1                Strongly Discouraged     Remeasure/reassess PCT    As 28 day mortality risk marker: \"Change in Procalcitonin Result\" (>80% or <=80%) if Day 0 (or Day 1) and Day 4 values are available. Refer to http://[email or web address] [email or web address]-pct-calculator.com    Change in PCT <=80%  A decrease of PCT levels below or equal to 80% defines a positive change in PCT test result representing a higher risk for 28-day all-cause mortality of patients diagnosed with severe sepsis for septic shock.    Change in PCT >80%  A decrease of PCT levels of more than 80% defines a negative change in PCT result representing a lower risk for 28-day all-cause mortality of patients diagnosed with severe sepsis or septic shock.      LIPASE - Normal   LACTIC ACID, PLASMA - Normal   BLOOD CULTURE   BLOOD CULTURE   URINALYSIS W/ CULTURE IF INDICATED   CBC AND DIFFERENTIAL    Narrative:     The following orders were created for panel order CBC & Differential.  Procedure                               Abnormality         Status                     ---------                               -----------         ------                     CBC Auto Differential[[id number]]        Abnormal            Final result                 Please view results for these tests on the individual orders. "       EKG:   No orders to display       Meds given in ED:   Medications   sodium chloride 0.9 % flush 10 mL (has no administration in time range)   azithromycin (ZITHROMAX) 500 mg in sodium chloride 0.9 % 250 mL IVPB-VTB (500 mg Intravenous Given 12/26/22 2040)   acetaminophen (TYLENOL) tablet 650 mg (650 mg Oral Given 12/26/22 1804)   lactated ringers bolus 1,000 mL (0 mL Intravenous Stopped 12/26/22 1920)   ondansetron (ZOFRAN) injection 4 mg (4 mg Intravenous Given 12/26/22 1804)   HYDROmorphone (DILAUDID) injection 0.5 mg (0.5 mg Intravenous Given 12/26/22 1806)   iopamidol (ISOVUE-300) 61 % injection 100 mL (85 mL Intravenous Given 12/26/22 1931)   cefTRIAXone (ROCEPHIN) 1 g in sodium chloride 0.9 % 100 mL IVPB-VTB (0 g Intravenous Stopped 12/26/22 2039)       Imaging results:  XR Chest 1 View    Result Date: 12/26/2022  Multifocal pulmonary opacities, concerning for pneumonia.  This report was finalized on 12/26/2022 7:24 PM by Dr. Jackelyn Merritt M.D.       Ambulatory status:   - Stand-by assist    Social issues:   Social History     Socioeconomic History    Marital status:    Tobacco Use    Smoking status: Never    Smokeless tobacco: Never   Vaping Use    Vaping Use: Never used   Substance and Sexual Activity    Alcohol use: Never    Drug use: Never    Sexual activity: Defer       NIH Stroke Scale:         Shirley King RN  12/26/22 20:59 EST

## 2022-12-27 NOTE — CONSULTS
"    [facility] Consult Note      Patient Name: Jacey Valdez  : 1955  MRN: [id number]  Primary Care Physician:  Morales Alan MD  Referring Physician: No ref. provider found  Date of admission: 2022    Subjective     Reason for Consult: History of renal transplantation    HPI:     Jacey Valdez is a 67 y.o. female with past medical history of  ESRD due to Polycystic Kidney Disease. 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 Patient’s biopsy shows PCKD.  Chronic immunocompromise host and history of Acute deep venous thrombosis of left lower extremity and  Acute deep venous thrombosis of right peroneal vein (HCC) 2021 .    Patient presents for onset of right quadrant discomfort not radiated not associated with urinary or GI symptoms.  Patient denies history of trauma or shingles.  Patient underwent evaluation in the emergency department and admitted for further care .  Patient has been seen by general surgery    Patient is currently being treated for a calculus cholelithiasis medically at the patient is not a surgical candidate due to advanced cirrhosis.    During her admission patient was tested for COVID and has been started on treatment    Review of Systems:   14 point review of systems is otherwise negative except for mentioned above on HPI    Personal History     Past Medical History:   Diagnosis Date   • Acute cystitis without hematuria 2022   • Kidney transplanted    • Right upper quadrant abdominal pain 2022   • Tachycardia     Had ablation       Past Surgical History:   Procedure Laterality Date   • PARATHYROIDECTOMY      \"All removed but 1/2 of one\"   • TRANSPLANTATION RENAL         Family History: family history is not on file.    Social History:  reports that she has never smoked. She has never " used smokeless tobacco. She reports that she does not drink alcohol and does not use drugs.    Home Medications:  Prior to Admission medications    Medication Sig Start Date End Date Taking? Authorizing Provider   multivitamin with minerals (MULTIVITAMIN ADULT PO) Take 1 tablet by mouth Daily.   Yes Kalen Mina MD   mycophenolate (CELLCEPT) 250 MG capsule Take 250 mg by mouth Every Morning.   Yes Kalen Mina MD   nadolol (CORGARD) 20 MG tablet Take 20 mg by mouth Daily.   Yes Kalen Mina MD   tacrolimus (PROGRAF) 1 MG capsule Take 1 mg by mouth 2 (Two) Times a Day.   Yes Kalen Mina MD       Allergies:  No Known Allergies    Objective     Vitals:   Temp:  [97 °F (36.1 °C)-97.8 °F (36.6 °C)] 97.3 °F (36.3 °C)  Heart Rate:  [55-68] 61  Resp:  [16-20] 20  BP: (101-129)/(68-80) 111/68  Flow (L/min):  [2] 2    Intake/Output Summary (Last 24 hours) at 12/27/2022 1809  Last data filed at 12/27/2022 1500  Gross per 24 hour   Intake 1360 ml   Output 800 ml   Net 560 ml       Physical Exam:   Constitutional: Awake, alert, no acute distress.  HEENT: Sclera anicteric, no conjunctival injection  Neck: Supple, no thyromegaly, no lymphadenopathy, trachea at midline, no JVD  Respiratory: Clear to auscultation bilaterally, nonlabored respiration  Cardiovascular: RRR, no murmurs,   Gastrointestinal: Positive bowel sounds, abdomen is soft, nontender and nondistended.  Palpable transplanted kidney  : No palpable bladder  Musculoskeletal: No edema, no clubbing or cyanosis.  Right hand clubbing with previous ipsilateral AV fistula  Psychiatric: Appropriate affect, cooperative  Neurologic: Oriented x3, moving all extremities, normal speech and mental status  Skin: Warm and dry       Scheduled Meds:     dexamethasone, 6 mg, Oral, Daily With Breakfast  enoxaparin, 40 mg, Subcutaneous, Q24H  [START ON 12/28/2022] mycophenolate, 250 mg, Oral, QAM  nadolol, 20 mg, Oral, Daily  [START ON 12/28/2022]  pantoprazole, 40 mg, Oral, Q AM  piperacillin-tazobactam, 3.375 g, Intravenous, Q8H  remdesivir, 200 mg, Intravenous, Q24H   Followed by  [START ON 12/28/2022] remdesivir, 100 mg, Intravenous, Q24H  sodium chloride, 10 mL, Intravenous, Q12H  tacrolimus, 1 mg, Oral, BID      IV Meds:   sodium chloride, 50 mL/hr, Last Rate: 50 mL/hr (12/27/22 1158)        Results Reviewed:   I have personally reviewed the results from the time of this admission to 12/27/2022 18:09 EST   Results from last 7 days   Lab Units 12/27/22  0945 12/26/22  1755   WBC 10*3/mm3 3.78 7.03   HEMOGLOBIN g/dL 9.7* 10.6*   HEMATOCRIT % 30.4* 33.4*   PLATELETS 10*3/mm3 105* 145         Lab Results   Component Value Date    GLUCOSE 124 (H) 12/27/2022    CALCIUM 8.9 12/27/2022     12/27/2022    K 4.0 12/27/2022    CO2 24.6 12/27/2022     12/27/2022    BUN 17 12/27/2022    CREATININE 0.90 12/27/2022    BCR 20.0 12/27/2022    ANIONGAP 6.4 12/27/2022      Lab Results   Component Value Date    MG 1.8 12/27/2022    PHOS 2.3 (L) 12/27/2022    ALBUMIN 2.5 (L) 12/27/2022     US Gallbladder    Result Date: 12/26/2022  US GALLBLADDER-clinical: Fever with right upper quadrant pain  Renal transplant  COMPARISON: None  FINDINGS: Only a small portion of the pancreatic body could be visualized and is within normal limits however the majority is obscured. No pancreatic ductal dilatation seen. No peripancreatic fluid identified.  There is some coarsening of the overall hepatic echotexture in addition there is some surface irregularity of the liver, the findings are worrisome for cirrhosis. No intrahepatic ductal dilatation.  The native right kidney is small, 4 cm in length. The transplant is 11 cm in length. Transplant cortical echotexture is within normal limits, no obstructive uropathy calculus or mass is demonstrated. No renal RI calculated.  The gallbladder is satisfactory in appearance. No gallstones or gallbladder wall thickening nor pericholecystic  fluid. CBD is dilated at 12 mm. Pancreatic protocol CT would be the best means for further evaluation. No free fluid is demonstrated within the upper abdomen.  CONCLUSION: The gallbladder is satisfactory in appearance, no gallstones seen. CBD diameter however is abnormal at 12 mm. Only small portion of the pancreatic body could be visualized. Pancreatic protocol CT would be the best means for further evaluation if possible.  The right renal transplant is satisfactory in appearance. There is coarsening of the hepatic echotexture with surface irregularity worrisome for cirrhosis. No free fluid is demonstrated within the right upper quadrant.  This report was finalized on 12/26/2022 7:05 PM by Dr. Tong Noriega M.D.         Assessment / Plan     ASSESSMENT:    - ESRD due to Polycystic Kidney Disease. 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  Patient is currently on CellCept 250 mg daily, tacrolimus 1 mg twice a day.we will continue current therapy and will follow closely renal function ..  Urinalysis nitrate positive with pyuria and bacteriuria  -H/o of cirrhosis of liver and portal hypertension by CT and MRI.  On nadolol   -COVID 19 pneumonia . Currently on remdesivir, dexamethasone , inhalers , supplemental oxygen and respiratory therapy . Isolation . As per primary team and pulmonology .  -Calculus cholelithiasis patient is not a surgical candidate as per general surgery on medical management on piperacillin tazobactam.  Possible evaluation by IR for percutaneous drainage  -Acute on chronic metallic anemia we will order iron studies  -Hypophosphatemia on phosphorus oral  Replacement, will follow trend    PLAN:  -Continue home dose immunosuppressant.   -Will order iron studies  -Currently her renal function ,electrolytes, and volume status remained stable  -Follow renal function closely  -Avoid nephrotoxins  -Adjust medications to  GFR      Thank you for involving us in the care of Jacey Valdez.  Please feel free to call with any questions.    Akin Tee MD  12/27/22  18:09 [facility]    [facility]  [phone]      Please note that portions of this note were completed with a voice recognition program.

## 2022-12-27 NOTE — CONSULTS
Consult for MRSA history review. Patient reviewed, MRSA history respiratory with active pneumonia, contact precautions advised, no current respiratory cultures in process. RN notified.

## 2022-12-28 LAB
ALBUMIN SERPL-MCNC: 2.5 G/DL (ref 3.5–5.2)
ALBUMIN/GLOB SERPL: 0.7 G/DL
ALP SERPL-CCNC: 78 U/L (ref 39–117)
ALT SERPL W P-5'-P-CCNC: 19 U/L (ref 1–33)
ANION GAP SERPL CALCULATED.3IONS-SCNC: 8 MMOL/L (ref 5–15)
AST SERPL-CCNC: 24 U/L (ref 1–32)
BACTERIA SPEC AEROBE CULT: NORMAL
BASOPHILS # BLD AUTO: 0 10*3/MM3 (ref 0–0.2)
BASOPHILS NFR BLD AUTO: 0 % (ref 0–1.5)
BILIRUB CONJ SERPL-MCNC: <0.2 MG/DL (ref 0–0.3)
BILIRUB SERPL-MCNC: 0.3 MG/DL (ref 0–1.2)
BUN SERPL-MCNC: 19 MG/DL (ref 8–23)
BUN/CREAT SERPL: 20.7 (ref 7–25)
CALCIUM SPEC-SCNC: 9.4 MG/DL (ref 8.6–10.5)
CHLORIDE SERPL-SCNC: 105 MMOL/L (ref 98–107)
CO2 SERPL-SCNC: 25 MMOL/L (ref 22–29)
CREAT SERPL-MCNC: 0.92 MG/DL (ref 0.57–1)
CRP SERPL-MCNC: 8.94 MG/DL (ref 0–0.5)
DEPRECATED RDW RBC AUTO: 44.2 FL (ref 37–54)
EGFRCR SERPLBLD CKD-EPI 2021: 67.5 ML/MIN/1.73
EOSINOPHIL # BLD AUTO: 0 10*3/MM3 (ref 0–0.4)
EOSINOPHIL NFR BLD AUTO: 0 % (ref 0.3–6.2)
ERYTHROCYTE [DISTWIDTH] IN BLOOD BY AUTOMATED COUNT: 14.8 % (ref 12.3–15.4)
FERRITIN SERPL-MCNC: 183 NG/ML (ref 13–150)
GLOBULIN UR ELPH-MCNC: 3.8 GM/DL
GLUCOSE SERPL-MCNC: 112 MG/DL (ref 65–99)
HCT VFR BLD AUTO: 30.6 % (ref 34–46.6)
HGB BLD-MCNC: 9.8 G/DL (ref 12–15.9)
IMM GRANULOCYTES # BLD AUTO: 0.01 10*3/MM3 (ref 0–0.05)
IMM GRANULOCYTES NFR BLD AUTO: 0.5 % (ref 0–0.5)
LYMPHOCYTES # BLD AUTO: 0.3 10*3/MM3 (ref 0.7–3.1)
LYMPHOCYTES NFR BLD AUTO: 15 % (ref 19.6–45.3)
MAGNESIUM SERPL-MCNC: 1.7 MG/DL (ref 1.6–2.4)
MCH RBC QN AUTO: 26.2 PG (ref 26.6–33)
MCHC RBC AUTO-ENTMCNC: 32 G/DL (ref 31.5–35.7)
MCV RBC AUTO: 81.8 FL (ref 79–97)
MONOCYTES # BLD AUTO: 0.18 10*3/MM3 (ref 0.1–0.9)
MONOCYTES NFR BLD AUTO: 9 % (ref 5–12)
NEUTROPHILS NFR BLD AUTO: 1.51 10*3/MM3 (ref 1.7–7)
NEUTROPHILS NFR BLD AUTO: 75.5 % (ref 42.7–76)
NRBC BLD AUTO-RTO: 0 /100 WBC (ref 0–0.2)
PHOSPHATE SERPL-MCNC: 3.2 MG/DL (ref 2.5–4.5)
PLATELET # BLD AUTO: 132 10*3/MM3 (ref 140–450)
PMV BLD AUTO: 11.1 FL (ref 6–12)
POTASSIUM SERPL-SCNC: 4.4 MMOL/L (ref 3.5–5.2)
PROT SERPL-MCNC: 6.3 G/DL (ref 6–8.5)
RBC # BLD AUTO: 3.74 10*6/MM3 (ref 3.77–5.28)
SODIUM SERPL-SCNC: 138 MMOL/L (ref 136–145)
WBC NRBC COR # BLD: 2 10*3/MM3 (ref 3.4–10.8)

## 2022-12-28 PROCEDURE — 86140 C-REACTIVE PROTEIN: CPT | Performed by: STUDENT IN AN ORGANIZED HEALTH CARE EDUCATION/TRAINING PROGRAM

## 2022-12-28 PROCEDURE — 25010000002 ENOXAPARIN PER 10 MG: Performed by: STUDENT IN AN ORGANIZED HEALTH CARE EDUCATION/TRAINING PROGRAM

## 2022-12-28 PROCEDURE — 80053 COMPREHEN METABOLIC PANEL: CPT | Performed by: STUDENT IN AN ORGANIZED HEALTH CARE EDUCATION/TRAINING PROGRAM

## 2022-12-28 PROCEDURE — 84100 ASSAY OF PHOSPHORUS: CPT | Performed by: STUDENT IN AN ORGANIZED HEALTH CARE EDUCATION/TRAINING PROGRAM

## 2022-12-28 PROCEDURE — 63710000001 MYCOPHENOLATE MOFETIL PER 250 MG: Performed by: STUDENT IN AN ORGANIZED HEALTH CARE EDUCATION/TRAINING PROGRAM

## 2022-12-28 PROCEDURE — 85025 COMPLETE CBC W/AUTO DIFF WBC: CPT | Performed by: STUDENT IN AN ORGANIZED HEALTH CARE EDUCATION/TRAINING PROGRAM

## 2022-12-28 PROCEDURE — 82728 ASSAY OF FERRITIN: CPT | Performed by: STUDENT IN AN ORGANIZED HEALTH CARE EDUCATION/TRAINING PROGRAM

## 2022-12-28 PROCEDURE — 25010000002 REMDESIVIR 100 MG/20ML SOLUTION 1 EACH VIAL: Performed by: STUDENT IN AN ORGANIZED HEALTH CARE EDUCATION/TRAINING PROGRAM

## 2022-12-28 PROCEDURE — 99232 SBSQ HOSP IP/OBS MODERATE 35: CPT | Performed by: INTERNAL MEDICINE

## 2022-12-28 PROCEDURE — 99232 SBSQ HOSP IP/OBS MODERATE 35: CPT | Performed by: SURGERY

## 2022-12-28 PROCEDURE — 63710000001 TACROLIMUS PER 1 MG: Performed by: STUDENT IN AN ORGANIZED HEALTH CARE EDUCATION/TRAINING PROGRAM

## 2022-12-28 PROCEDURE — 82248 BILIRUBIN DIRECT: CPT | Performed by: STUDENT IN AN ORGANIZED HEALTH CARE EDUCATION/TRAINING PROGRAM

## 2022-12-28 PROCEDURE — 83735 ASSAY OF MAGNESIUM: CPT | Performed by: STUDENT IN AN ORGANIZED HEALTH CARE EDUCATION/TRAINING PROGRAM

## 2022-12-28 PROCEDURE — 25010000002 PIPERACILLIN SOD-TAZOBACTAM PER 1 G: Performed by: STUDENT IN AN ORGANIZED HEALTH CARE EDUCATION/TRAINING PROGRAM

## 2022-12-28 PROCEDURE — 63710000001 DEXAMETHASONE PER 0.25 MG: Performed by: STUDENT IN AN ORGANIZED HEALTH CARE EDUCATION/TRAINING PROGRAM

## 2022-12-28 RX ORDER — FERROUS SULFATE 325(65) MG
325 TABLET ORAL 2 TIMES DAILY WITH MEALS
Status: DISCONTINUED | OUTPATIENT
Start: 2022-12-28 | End: 2022-12-31 | Stop reason: HOSPADM

## 2022-12-28 RX ORDER — ASCORBIC ACID 500 MG
250 TABLET ORAL DAILY
Status: DISCONTINUED | OUTPATIENT
Start: 2022-12-28 | End: 2022-12-31 | Stop reason: HOSPADM

## 2022-12-28 RX ADMIN — Medication 1 PACKET: at 18:44

## 2022-12-28 RX ADMIN — PANTOPRAZOLE SODIUM 40 MG: 40 TABLET, DELAYED RELEASE ORAL at 05:23

## 2022-12-28 RX ADMIN — Medication 1 PACKET: at 09:14

## 2022-12-28 RX ADMIN — TACROLIMUS 1 MG: 1 CAPSULE ORAL at 21:12

## 2022-12-28 RX ADMIN — NADOLOL 20 MG: 20 TABLET ORAL at 09:13

## 2022-12-28 RX ADMIN — OXYCODONE HYDROCHLORIDE AND ACETAMINOPHEN 250 MG: 500 TABLET ORAL at 09:13

## 2022-12-28 RX ADMIN — FERROUS SULFATE TAB 325 MG (65 MG ELEMENTAL FE) 325 MG: 325 (65 FE) TAB at 18:50

## 2022-12-28 RX ADMIN — MYCOPHENOLATE MOFETIL 250 MG: 250 CAPSULE ORAL at 09:18

## 2022-12-28 RX ADMIN — TAZOBACTAM SODIUM AND PIPERACILLIN SODIUM 3.38 G: 375; 3 INJECTION, SOLUTION INTRAVENOUS at 21:12

## 2022-12-28 RX ADMIN — FERROUS SULFATE TAB 325 MG (65 MG ELEMENTAL FE) 325 MG: 325 (65 FE) TAB at 09:13

## 2022-12-28 RX ADMIN — DEXAMETHASONE 6 MG: 4 TABLET ORAL at 09:13

## 2022-12-28 RX ADMIN — TACROLIMUS 1 MG: 1 CAPSULE ORAL at 09:13

## 2022-12-28 RX ADMIN — Medication 10 ML: at 09:14

## 2022-12-28 RX ADMIN — ENOXAPARIN SODIUM 40 MG: 100 INJECTION SUBCUTANEOUS at 18:43

## 2022-12-28 RX ADMIN — TAZOBACTAM SODIUM AND PIPERACILLIN SODIUM 3.38 G: 375; 3 INJECTION, SOLUTION INTRAVENOUS at 05:23

## 2022-12-28 RX ADMIN — REMDESIVIR 100 MG: 100 INJECTION, POWDER, LYOPHILIZED, FOR SOLUTION INTRAVENOUS at 18:43

## 2022-12-28 RX ADMIN — SODIUM CHLORIDE 50 ML/HR: 9 INJECTION, SOLUTION INTRAVENOUS at 05:08

## 2022-12-28 RX ADMIN — TAZOBACTAM SODIUM AND PIPERACILLIN SODIUM 3.38 G: 375; 3 INJECTION, SOLUTION INTRAVENOUS at 14:00

## 2022-12-28 NOTE — PROGRESS NOTES
Name: Jacey Valdez ADMIT: 2022   : 1955  PCP: Morales Alan MD    MRN: [id number] LOS: 2 days   AGE/SEX: 67 y.o. female  ROOM: [facility]     Subjective   Subjective     Off oxygen when I saw her. No further abdominal pain       Objective   Objective   Vital Signs  Temp:  [97 °F (36.1 °C)-97.9 °F (36.6 °C)] 97 °F (36.1 °C)  Heart Rate:  [57-76] 59  Resp:  [18] 18  BP: (109-141)/(66-83) 128/80  SpO2:  [95 %-97 %] 95 %  on  Flow (L/min):  [2] 2;   Device (Oxygen Therapy):  system;nasal cannula  Body mass index is 26.3 kg/m².  Physical Exam  Constitutional:       General: She is not in acute distress.     Appearance: She is not toxic-appearing.   Cardiovascular:      Rate and Rhythm: Normal rate and regular rhythm.      Heart sounds: Normal heart sounds.   Pulmonary:      Effort: Pulmonary effort is normal. No respiratory distress.      Breath sounds: Normal breath sounds. No wheezing, rhonchi or rales.   Abdominal:      General: Bowel sounds are normal. There is no distension.      Palpations: Abdomen is soft.      Tenderness: There is no abdominal tenderness. There is no guarding or rebound.   Musculoskeletal:         General: No tenderness.      Right lower leg: No edema.      Left lower leg: No edema.   Neurological:      Mental Status: She is alert.   Psychiatric:         Mood and Affect: Mood normal.         Behavior: Behavior normal.         Results Review     I reviewed the patient's new clinical results.  Results from last 7 days   Lab Units 22  0917 22  0945 22  1755   WBC 10*3/mm3 2.00* 3.78 7.03   HEMOGLOBIN g/dL 9.8* 9.7* 10.6*   PLATELETS 10*3/mm3 132* 105* 145     Results from last 7 days   Lab Units 22  0917 22  1724 22  0945 22  1755   SODIUM mmol/L 138  --  138 138   POTASSIUM mmol/L 4.4  --  4.0 3.9   CHLORIDE mmol/L 105  --  107 107   CO2 mmol/L 25.0  --  24.6 23.7   BUN mg/dL 19  --  17 19   CREATININE mg/dL 0.92 0.90 0.85 0.91    GLUCOSE mg/dL 112*  --  124* 137*   Estimated Creatinine Clearance: 69.9 mL/min (by C-G formula based on SCr of 0.92 mg/dL).  Results from last 7 days   Lab Units 12/28/22  0917 12/27/22  1724 12/26/22  1755   ALBUMIN g/dL 2.5* 2.5* 2.9*   BILIRUBIN mg/dL 0.3 0.4 0.7   ALK PHOS U/L 78 76 89   AST (SGOT) U/L 24 26 37*   ALT (SGPT) U/L 19 24 29     Results from last 7 days   Lab Units 12/28/22  0917 12/27/22  1724 12/27/22  0945 12/26/22  1755   CALCIUM mg/dL 9.4  --  8.9 9.3   ALBUMIN g/dL 2.5* 2.5*  --  2.9*   MAGNESIUM mg/dL 1.7  --  1.8  --    PHOSPHORUS mg/dL 3.2  --  2.3*  --      Results from last 7 days   Lab Units 12/26/22  1755   PROCALCITONIN ng/mL 0.26*   LACTATE mmol/L 1.0     COVID19   Date Value Ref Range Status   12/27/2022 Detected (C) Not Detected - Ref. Range Final     No results found for: HGBA1C, POCGLU    CT Abdomen Pelvis With Contrast  CT ABDOMEN PELVIS W CONTRAST-     Radiation dose reduction techniques were utilized, including automated  exposure control and exposure modulation based on body size.           Clinical: Right upper quadrant pain, febrile     Correlation with gallbladder ultrasound earlier at 1831 hours.     FINDINGS: There is mild thickening and irregularity of the gallbladder  wall which was not appreciated on the ultrasound examination.  Potentially calculus cholecystitis. CBD is dilated, 11 mm in diameter.  This smoothly tapers to the ampulla. No stone demonstrated. Possible  small diverticulum at this location measuring 13 mm, partially filled  with gas.     The liver is grossly enlarged and heterogenous in attenuation with  superficial irregularity and a nodular appearance consistent with  cirrhosis. Areas of diminished attenuation likely fatty infiltration.  Underlying diffuse hepatocellular disease or neoplasm possible. The  spleen is enlarged and there is extensive collateral vessel formation  throughout the upper abdomen with recannulization of the umbilical  vein  consistent with portal hypertension. Within the right lobe of the liver  there is a 14 mm area of nodularity having the appearance of a cyst. The  pancreas is satisfactory in appearance.     Both adrenal glands are normal. The native kidneys are small and there  is nephrocalcinosis. There is 2 cm right renal cyst. Right pelvic renal  transplant demonstrates a normal pattern of enhancement, no obstructive  uropathy mass or calculus. There is wall thickening demonstrated along  the right lateral border of the urinary bladder. There is some serosal  spiculation at this location. Cystitis not excluded. Tiny gas bubble is  noted within, has a been recent catheterization?     There is diverticulosis of the colon. No indication of diverticulitis.  Within the midabdomen there is a thick-walled segment of jejunum  measuring approximately 12 cm in length. This consistent with enteritis.  The stomach is collapsed. Duodenal bulb is typical in appearance. There  is some fold thickening and irregularity of the second third and fourth  portions of the duodenum suggestive of duodenitis.     No free air or free intraperitoneal fluid. The uterus is small in size,  appropriate for age, no ovarian abnormality. There are Tarlov cysts.     Conclusion:  1. The liver is grossly abnormal, it is enlarged with surface  irregularity and heterogenous. The appearance is consistent with  cirrhosis with likely fatty infiltration. Underlying hepatitis or  hepatocellular carcinoma not excluded. There is splenomegaly and  extensive collateral vessel formation consistent with portal  hypertension.  2. There is gallbladder wall thickening and irregularity, possible  acalculus cholecystitis. CBD is dilated at 11 mm with smooth tapering to  the ampulla. No definite stone identified. At this location there  appears to be a partially gas-filled diverticulum.  3. Renal transplant is satisfactory in appearance. There is asymmetric  wall thickening of  the right bladder with serosal irregularity/edema  worrisome for cystitis.  4. Long segment of jejunum which demonstrates wall thickening. In  addition there is fold irregularity of the duodenum, findings suggest  enteritis/duodenitis.  5. Diverticulosis of the colon. No diverticulitis.     MRI/MRCP would be helpful as follow-up.        This report was finalized on 12/26/2022 8:09 PM by Dr. Tong Noriega M.D.     XR Chest 1 View  Narrative: SINGLE VIEW OF THE CHEST     HISTORY: Fever     COMPARISON: None available.     FINDINGS:  Cardiomegaly is present. There is tortuosity of the aorta. No  pneumothorax is seen. There is some blunting the left costophrenic  angle. There are multifocal pulmonary opacities which are noted within  the right upper lobe, concerning for pneumonia. There is some additional  mild consolidation noted at the lung bases bilaterally. This may  represent atelectasis and/or scarring, although additional infiltrate is  not excluded.     Impression: Multifocal pulmonary opacities, concerning for pneumonia.     This report was finalized on 12/26/2022 7:24 PM by Dr. Jackelyn Merritt M.D.     US Gallbladder  US GALLBLADDER-clinical: Fever with right upper quadrant pain     Renal transplant     COMPARISON: None     FINDINGS: Only a small portion of the pancreatic body could be  visualized and is within normal limits however the majority is obscured.  No pancreatic ductal dilatation seen. No peripancreatic fluid  identified.     There is some coarsening of the overall hepatic echotexture in addition  there is some surface irregularity of the liver, the findings are  worrisome for cirrhosis. No intrahepatic ductal dilatation.     The native right kidney is small, 4 cm in length. The transplant is 11  cm in length. Transplant cortical echotexture is within normal limits,  no obstructive uropathy calculus or mass is demonstrated. No renal RI  calculated.     The gallbladder is satisfactory in  appearance. No gallstones or  gallbladder wall thickening nor pericholecystic fluid. CBD is dilated at  12 mm. Pancreatic protocol CT would be the best means for further  evaluation. No free fluid is demonstrated within the upper abdomen.     CONCLUSION: The gallbladder is satisfactory in appearance, no gallstones  seen. CBD diameter however is abnormal at 12 mm. Only small portion of  the pancreatic body could be visualized. Pancreatic protocol CT would be  the best means for further evaluation if possible.     The right renal transplant is satisfactory in appearance. There is  coarsening of the hepatic echotexture with surface irregularity  worrisome for cirrhosis. No free fluid is demonstrated within the right  upper quadrant.     This report was finalized on 12/26/2022 7:05 PM by Dr. Tong Noriega M.D.       Scheduled Medications  ascorbic acid, 250 mg, Oral, Daily  dexamethasone, 6 mg, Oral, Daily With Breakfast  enoxaparin, 40 mg, Subcutaneous, Q24H  ferrous sulfate, 325 mg, Oral, BID With Meals  mycophenolate, 250 mg, Oral, QAM  nadolol, 20 mg, Oral, Daily  pantoprazole, 40 mg, Oral, Q AM  piperacillin-tazobactam, 3.375 g, Intravenous, Q8H  potassium & sodium phosphates, 1 packet, Oral, BID AC  remdesivir, 100 mg, Intravenous, Q24H  sodium chloride, 10 mL, Intravenous, Q12H  tacrolimus, 1 mg, Oral, BID    Infusions  sodium chloride, 50 mL/hr, Last Rate: 50 mL/hr (12/28/22 0508)    Diet  Diet: Regular/House Diet, Gastrointestinal Diets, Renal Diets; Low Sodium (2-3g); Low Irritant; Texture: Regular Texture (IDDSI 7); Fluid Consistency: Thin (IDDSI 0)       Assessment/Plan     Active Hospital Problems    Diagnosis  POA   • **Pneumonia due to COVID-19 virus [U07.1, J12.82]  Yes   • Right upper quadrant abdominal pain [R10.11]  Yes   • Acalculous cholecystitis [K81.9]  Yes   • Duodenitis [K29.80]  Yes   • Cirrhosis of liver (HCC) [K74.60]  Yes   • Multifocal pneumonia [J18.9]  Yes   • History of kidney  transplant [Z94.0]  Not Applicable      Resolved Hospital Problems   No resolved problems to display.       67 y.o. female admitted with Pneumonia due to COVID-19 virus.    · COVID-19 pneumonia causing hypoxemia-continue remdesivir for 5 days or until discharge. Continue dexamethasone for 10 days or until discharge. Monitor CRP, liver and renal function  · Acalculous cholecystitis-improving on zosyn. Not a candidate for cholecystectomy with her cirrhosis history  · Duodenitis-GI is consulted and considering EGD. No objection from my standpoint to proceed, but I do recognize that often COVID infection prevents non-urgent diagnostic procedures. Continue ppi.   · Asymptomatic bacturia-urine with mixed lori on culture  · Cirrhosis with history of varices-continue nadolol   · History of renal transplantation for polycystic kidney disease-continue cellcept and tacrolimus  · Lovenox 40 mg SC daily for DVT prophylaxis.  · Full code.  · Discussed with patient, spouse and nursing staff.  · Anticipate discharge home with family timing yet to be determined.      Pablo Grove MD  [facility]  12/28/22  14:15 EST    I wore protective equipment throughout this patient encounter including a face mask, gloves and protective eyewear.  Hand hygiene was performed before donning protective equipment and after removal when leaving the room.

## 2022-12-28 NOTE — PLAN OF CARE
Problem: Adult Inpatient Plan of Care  Goal: Plan of Care Review  Outcome: Ongoing, Progressing  Goal: Patient-Specific Goal (Individualized)  Outcome: Ongoing, Progressing  Goal: Absence of Hospital-Acquired Illness or Injury  Outcome: Ongoing, Progressing  Intervention: Identify and Manage Fall Risk  Recent Flowsheet Documentation  Taken 12/28/2022 0000 by Aida Rich RN  Safety Promotion/Fall Prevention: safety round/check completed  Taken 12/27/2022 2200 by Aida Rich RN  Safety Promotion/Fall Prevention: safety round/check completed  Taken 12/27/2022 2000 by Aida Rich RN  Safety Promotion/Fall Prevention:   activity supervised   assistive device/personal items within reach   clutter free environment maintained   fall prevention program maintained   lighting adjusted   nonskid shoes/slippers when out of bed   room organization consistent   safety round/check completed   toileting scheduled  Intervention: Prevent Skin Injury  Recent Flowsheet Documentation  Taken 12/28/2022 0000 by Aida Rich RN  Body Position: position changed independently  Taken 12/27/2022 2200 by Aida Rich RN  Body Position: position changed independently  Taken 12/27/2022 2000 by Aida Rich RN  Body Position: position changed independently  Skin Protection:   tubing/devices free from skin contact   transparent dressing maintained   adhesive use limited  Intervention: Prevent and Manage VTE (Venous Thromboembolism) Risk  Recent Flowsheet Documentation  Taken 12/28/2022 0000 by Aida Rich RN  Activity Management: activity adjusted per tolerance  Taken 12/27/2022 2200 by Aida Rich RN  Activity Management: activity adjusted per tolerance  Taken 12/27/2022 2000 by Aida Rich RN  Activity Management: activity adjusted per tolerance  VTE Prevention/Management: (lovenox) other (see comments)  Range of Motion: active ROM (range of motion) encouraged  Intervention: Prevent  Infection  Recent Flowsheet Documentation  Taken 12/27/2022 2200 by Aida Rich, RN  Infection Prevention:   visitors restricted/screened   single patient room provided   rest/sleep promoted   personal protective equipment utilized   hand hygiene promoted   environmental surveillance performed  Taken 12/27/2022 2000 by Aida Rich RN  Infection Prevention:   visitors restricted/screened   single patient room provided   rest/sleep promoted   personal protective equipment utilized   hand hygiene promoted  Goal: Optimal Comfort and Wellbeing  Outcome: Ongoing, Progressing  Intervention: Monitor Pain and Promote Comfort  Recent Flowsheet Documentation  Taken 12/27/2022 2000 by Aida Rich RN  Pain Management Interventions:   see MAR   quiet environment facilitated   position adjusted   pillow support provided   care clustered   diversional activity provided  Intervention: Provide Person-Centered Care  Recent Flowsheet Documentation  Taken 12/27/2022 2000 by Aida Rich RN  Trust Relationship/Rapport:   care explained   choices provided   emotional support provided   empathic listening provided   questions answered  Goal: Readiness for Transition of Care  Outcome: Ongoing, Progressing     Problem: Fall Injury Risk  Goal: Absence of Fall and Fall-Related Injury  Outcome: Ongoing, Progressing  Intervention: Promote Injury-Free Environment  Recent Flowsheet Documentation  Taken 12/28/2022 0000 by Aida Rich, RN  Safety Promotion/Fall Prevention: safety round/check completed  Taken 12/27/2022 2200 by Aida Rich RN  Safety Promotion/Fall Prevention: safety round/check completed  Taken 12/27/2022 2000 by Aida Rich, RN  Safety Promotion/Fall Prevention:   activity supervised   assistive device/personal items within reach   clutter free environment maintained   fall prevention program maintained   lighting adjusted   nonskid shoes/slippers when out of bed   room organization  consistent   safety round/check completed   toileting scheduled     Problem: Pain Acute  Goal: Acceptable Pain Control and Functional Ability  Outcome: Ongoing, Progressing  Intervention: Prevent or Manage Pain  Recent Flowsheet Documentation  Taken 12/27/2022 2000 by Aida Rich, JALEN  Bowel Elimination Promotion: adequate fluid intake promoted  Intervention: Develop Pain Management Plan  Recent Flowsheet Documentation  Taken 12/27/2022 2000 by Aida Rich, RN  Pain Management Interventions:   see MAR   quiet environment facilitated   position adjusted   pillow support provided   care clustered   diversional activity provided  Intervention: Optimize Psychosocial Wellbeing  Recent Flowsheet Documentation  Taken 12/27/2022 2000 by Aida Rich, RN  Supportive Measures: active listening utilized  Diversional Activities:   smartphone   television   Goal Outcome Evaluation:

## 2022-12-28 NOTE — PROGRESS NOTES
[facility]  Inpatient Progress Note    Reason for Follow Up: Abnormal CT of the abdomen    Subjective     Interval History:   No longer requiring oxygen.  Overall feels better.  Abdominal pain is resolved.  No nausea or vomiting.  Eating a hamburger.    Current Facility-Administered Medications:   •  ascorbic acid (VITAMIN C) tablet 250 mg, 250 mg, Oral, Daily, Akin Tee MD, 250 mg at 12/28/22 0913  •  dexamethasone (DECADRON) tablet 6 mg, 6 mg, Oral, Daily With Breakfast, Pablo Grove MD, 6 mg at 12/28/22 0913  •  Enoxaparin Sodium (LOVENOX) syringe 40 mg, 40 mg, Subcutaneous, Q24H, Pablo Grove MD, 40 mg at 12/27/22 1553  •  ferrous sulfate tablet 325 mg, 325 mg, Oral, BID With Meals, Akin Tee MD, 325 mg at 12/28/22 0913  •  HYDROmorphone (DILAUDID) injection 0.5 mg, 0.5 mg, Intravenous, Q3H PRN, Erika Rodriguez APRN, 0.5 mg at 12/27/22 2009  •  melatonin tablet 5 mg, 5 mg, Oral, Nightly PRN, Sylvia Ceja MD  •  mycophenolate (CELLCEPT) capsule 250 mg, 250 mg, Oral, QAM, Pablo Grove MD, 250 mg at 12/28/22 0918  •  nadolol (CORGARD) tablet 20 mg, 20 mg, Oral, Daily, Pablo Grove MD, 20 mg at 12/28/22 0913  •  ondansetron (ZOFRAN) tablet 4 mg, 4 mg, Oral, Q6H PRN, 4 mg at 12/27/22 2009 **OR** ondansetron (ZOFRAN) injection 4 mg, 4 mg, Intravenous, Q6H PRN, Sylvia Ceja MD  •  pantoprazole (PROTONIX) EC tablet 40 mg, 40 mg, Oral, Q AM, Pablo Grove MD, 40 mg at 12/28/22 0523  •  piperacillin-tazobactam (ZOSYN) 3.375 g in iso-osmotic dextrose 50 ml (premix), 3.375 g, Intravenous, Q8H, Sylvia Ceja MD, 3.375 g at 12/28/22 1400  •  potassium & sodium phosphates (PHOS-NAK) oral packet, 1 packet, Oral, BID AC, Akin Tee MD, 1 packet at 12/28/22 0914  •  [COMPLETED] remdesivir 200 mg in sodium chloride 0.9 % 290 mL IVPB (powder vial), 200 mg, Intravenous, Q24H, 200 mg at 12/27/22 1710 **FOLLOWED BY** remdesivir 100 mg in  sodium chloride 0.9 % 250 mL IVPB (powder vial), 100 mg, Intravenous, Q24H, Pablo Grove MD  •  [COMPLETED] Insert Peripheral IV, , , Once **AND** sodium chloride 0.9 % flush 10 mL, 10 mL, Intravenous, PRN, Felix Dhillon MD  •  sodium chloride 0.9 % flush 10 mL, 10 mL, Intravenous, Q12H, Sylvia Ceja MD, 10 mL at 12/28/22 0914  •  sodium chloride 0.9 % flush 10 mL, 10 mL, Intravenous, PRN, Sylvia Ceja MD  •  sodium chloride 0.9 % infusion 40 mL, 40 mL, Intravenous, PRN, Sylvia Ceja MD  •  sodium chloride 0.9 % infusion, 50 mL/hr, Intravenous, Continuous, Pablo Grove MD, Last Rate: 50 mL/hr at 12/28/22 0508, 50 mL/hr at 12/28/22 0508  •  tacrolimus (PROGRAF) capsule 1 mg, 1 mg, Oral, BID, Pablo Grove MD, 1 mg at 12/28/22 0913  Review of Systems:    Negative for shortness of breath or cough, negative for abdominal pain or nausea    Objective     Vital Signs  Temp:  [97 °F (36.1 °C)-97.9 °F (36.6 °C)] 97 °F (36.1 °C)  Heart Rate:  [57-76] 59  Resp:  [18] 18  BP: (109-141)/(66-83) 128/80  Body mass index is 26.3 kg/m².    Intake/Output Summary (Last 24 hours) at 12/28/2022 1743  Last data filed at 12/28/2022 0523  Gross per 24 hour   Intake 600 ml   Output --   Net 600 ml     No intake/output data recorded.     Physical Exam:   General: patient awake, alert and cooperative   Eyes: Normal lids and lashes, no scleral icterus   Neck: supple, normal ROM   Skin: warm and dry, not jaundiced   Pulm:   regular and unlabored   Abdomen: soft, nontender, nondistended    Psychiatric: Normal mood and behavior; memory intact     Results Review:     I reviewed the patient's new clinical results.    Results from last 7 days   Lab Units 12/28/22  0917 12/27/22  0945 12/26/22  1755   WBC 10*3/mm3 2.00* 3.78 7.03   HEMOGLOBIN g/dL 9.8* 9.7* 10.6*   HEMATOCRIT % 30.6* 30.4* 33.4*   PLATELETS 10*3/mm3 132* 105* 145     Results from last 7 days   Lab Units 12/28/22  0917 12/27/22  1724 12/27/22  0945  12/26/22  1755   SODIUM mmol/L 138  --  138 138   POTASSIUM mmol/L 4.4  --  4.0 3.9   CHLORIDE mmol/L 105  --  107 107   CO2 mmol/L 25.0  --  24.6 23.7   BUN mg/dL 19  --  17 19   CREATININE mg/dL 0.92 0.90 0.85 0.91   CALCIUM mg/dL 9.4  --  8.9 9.3   BILIRUBIN mg/dL 0.3 0.4  --  0.7   ALK PHOS U/L 78 76  --  89   ALT (SGPT) U/L 19 24  --  29   AST (SGOT) U/L 24 26  --  37*   GLUCOSE mg/dL 112*  --  124* 137*         Lab Results   Lab Value Date/Time    LIPASE 36 12/26/2022 1755    LIPASE 32 07/28/2021 1137       Radiology:  CT Abdomen Pelvis With Contrast   Final Result      XR Chest 1 View   Final Result      US Gallbladder   Final Result          Assessment & Plan     Patient Active Problem List   Diagnosis   • Multifocal pneumonia   • Right upper quadrant abdominal pain   • Benign hypertensive kidney disease with chronic kidney disease   • Benign hypertension   • History of kidney transplant   • Pneumonia due to COVID-19 virus   • Acalculous cholecystitis   • Duodenitis   • Cirrhosis of liver (HCC)     All problems are new to me today   assessment:  1.  Abnormal CT imaging of the abdomen with duodenitis, thickened jejunum     2.  Cirrhosis with portal hypertension: Chronic issue, thought to be related to her polycystic disease     3.  Acute right upper quadrant pain: Concerns for acalculus cholecystitis     4.  History of polycystic kidney disease status postrenal transplant on immunosuppression     5.  Multifocal pneumonia: On antibiotics      Plan:  · Surgery input noted-not a candidate for cholecystectomy  · Plan for EGD tomorrow for further evaluation of her abnormal CT images of the duodenum  · Will need close outpatient follow-up with Dr. Hernandez given her history of cirrhosis    I discussed the patients findings and my recommendations with patient.    Kristin Thao MD

## 2022-12-28 NOTE — PROGRESS NOTES
[facility]\"" Progress Note      Patient Name: Jacey Valdez  : 1955  MRN: [id number]  Primary Care Physician:  Morales Alan MD  Date of admission: 2022    Subjective     Interval History:     Patient overnight no event    Accompanied by family member sitting on recliner    Palpation has noted significant improvement of her right upper quadrant discomfort, continues to tolerate oral intake without nausea or emesis.  Having regular bowel movements    No fevers or chills    Review of Systems:   As noted above    Objective     Vitals:   Temp:  [97.3 °F (36.3 °C)-97.9 °F (36.6 °C)] 97.9 °F (36.6 °C)  Heart Rate:  [55-76] 57  Resp:  [18-20] 18  BP: (109-141)/(66-83) 109/66  Flow (L/min):  [2] 2    Intake/Output Summary (Last 24 hours) at 2022 0854  Last data filed at 2022 0523  Gross per 24 hour   Intake 600 ml   Output 600 ml   Net 0 ml       Physical Exam:      Constitutional: Awake, alert, no acute distress.  HEENT: Sclera anicteric, no conjunctival injection  Neck: Supple, no thyromegaly, no lymphadenopathy, trachea at midline, no JVD  Respiratory: Clear to auscultation bilaterally, nonlabored respiration  Cardiovascular: RRR, no murmurs,   Gastrointestinal: Positive bowel sounds, abdomen is soft, nontender and nondistended.  Palpable transplanted kidney  : No palpable bladder  Musculoskeletal: No edema, no clubbing or cyanosis.  Right hand clubbing with previous ipsilateral AV fistula  Psychiatric: Appropriate affect, cooperative  Neurologic: Oriented x3, moving all extremities, normal speech and mental status  Skin: Warm and dry        Scheduled Meds:     ascorbic acid, 250 mg, Oral, Daily  dexamethasone, 6 mg, Oral, Daily With Breakfast  enoxaparin, 40 mg, Subcutaneous, Q24H  ferrous sulfate, 325 mg, Oral, BID With Meals  mycophenolate, 250 mg, Oral, QAM  nadolol, 20 mg, Oral, Daily  pantoprazole, 40 mg, Oral, Q AM  piperacillin-tazobactam, 3.375 g,  Intravenous, Q8H  potassium & sodium phosphates, 1 packet, Oral, BID AC  remdesivir, 100 mg, Intravenous, Q24H  sodium chloride, 10 mL, Intravenous, Q12H  tacrolimus, 1 mg, Oral, BID      IV Meds:   sodium chloride, 50 mL/hr, Last Rate: 50 mL/hr (12/28/22 2023)        Results Reviewed:   I have personally reviewed the results from the time of this admission to 12/28/2022 08:54 EST     Results from last 7 days   Lab Units 12/27/22  1724 12/27/22  0945 12/26/22  1755   SODIUM mmol/L  --  138 138   POTASSIUM mmol/L  --  4.0 3.9   CHLORIDE mmol/L  --  107 107   CO2 mmol/L  --  24.6 23.7   BUN mg/dL  --  17 19   CREATININE mg/dL 0.90 0.85 0.91   CALCIUM mg/dL  --  8.9 9.3   BILIRUBIN mg/dL 0.4  --  0.7   ALK PHOS U/L 76  --  89   ALT (SGPT) U/L 24  --  29   AST (SGOT) U/L 26  --  37*   GLUCOSE mg/dL  --  124* 137*       Estimated Creatinine Clearance: 71.4 mL/min (by C-G formula based on SCr of 0.9 mg/dL).    Results from last 7 days   Lab Units 12/27/22  0945   MAGNESIUM mg/dL 1.8   PHOSPHORUS mg/dL 2.3*             Results from last 7 days   Lab Units 12/27/22  0945 12/26/22  1755   WBC 10*3/mm3 3.78 7.03   HEMOGLOBIN g/dL 9.7* 10.6*   PLATELETS 10*3/mm3 105* 145             Assessment / Plan     ASSESSMENT:    - ESRD due to Polycystic Kidney Disease. 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  Patient is currently on CellCept 250 mg daily, tacrolimus 1 mg twice a day.we will continue current therapy and will follow closely renal function ..  Urinalysis nitrate positive with pyuria and bacteriuria  -H/o of cirrhosis of liver and portal hypertension by CT and MRI.  On nadolol   -COVID 19 pneumonia . Currently on remdesivir, dexamethasone , inhalers , supplemental oxygen and respiratory therapy . Isolation . As per primary team and pulmonology .  -Acalculus cholelithiasis patient is not a surgical candidate as per general surgery on  medical management on piperacillin tazobactam  -Acute on chronic normocytic anemia . Started on ferrous sulfate and vitamin C ,   -Hypophosphatemia on phosphorus oral  Replacement, will follow trend       PLAN:    -Continue current immunosuppressant.   -Currently her renal function ,electrolytes, and volume status remained stable  -Started on ferrous sulfate and vitamin C ,  -Follow renal function closely  -Avoid nephrotoxins  -Adjust medications to GFR       Thank you for involving us in the care of Jacey Valdez.  Please feel free to call with any questions.    Akin Tee MD  12/28/22  08:54 [facility]    [facility]  [phone]    Please note that portions of this note were completed with a voice recognition program.

## 2022-12-28 NOTE — PROGRESS NOTES
IMPRESSION & PLAN:  67-year-old lady with a calculus cholecystitis.  Symptoms are improving on Zosyn.  Will advance diet today.  Continue to monitor for pain and nausea.  Not a surgical candidate secondary to advanced cirrhosis and would require cholecystostomy tube if clinical worsening.    CC:    Chief Complaint   Patient presents with   • Abdominal Pain         HPI: Her right upper quadrant pain is much improved today.  She tolerated clear liquids without any nausea or emesis.    ROS:   Constitutional: No fever, no chills  CV:  No chest pain. No palpitations.   Lungs:  No shortness of breath or cough.   GI:  No nausea, no vomiting, no diarrhea, no constipation, no anorexia.       PE:    VS:   Vitals:    12/28/22 0742   BP: 109/66   Pulse: 57   Resp: 18   Temp: 97.9 °F (36.6 °C)   SpO2: 96%      CONST: Awake, alert  LUNGS: symmetric excursion, normal inspiratory effort  CV: regular rate, well perfused  Abdomen:  Soft, minimal right upper quadrant tenderness    LABS:  WBC 2       Yes

## 2022-12-28 NOTE — PLAN OF CARE
Goal Outcome Evaluation:  Plan of Care Reviewed With: patient           Outcome Evaluation: medicated for right sided abdominal pain x1.  Patient reports pain is improving.  Tolerating clear liquid diet.  No nausea.  Nonproductive cough.  COVID positive test today.  IVF's started.

## 2022-12-29 ENCOUNTER — ANESTHESIA (OUTPATIENT)
Dept: GASTROENTEROLOGY | Facility: HOSPITAL | Age: 67
DRG: 177 | End: 2022-12-29
Payer: COMMERCIAL

## 2022-12-29 ENCOUNTER — ANESTHESIA EVENT (OUTPATIENT)
Dept: GASTROENTEROLOGY | Facility: HOSPITAL | Age: 67
DRG: 177 | End: 2022-12-29
Payer: COMMERCIAL

## 2022-12-29 LAB
ALBUMIN SERPL-MCNC: 2.7 G/DL (ref 3.5–5.2)
ALP SERPL-CCNC: 73 U/L (ref 39–117)
ALT SERPL W P-5'-P-CCNC: 22 U/L (ref 1–33)
ANION GAP SERPL CALCULATED.3IONS-SCNC: 5.3 MMOL/L (ref 5–15)
AST SERPL-CCNC: 27 U/L (ref 1–32)
BILIRUB CONJ SERPL-MCNC: <0.2 MG/DL (ref 0–0.3)
BILIRUB INDIRECT SERPL-MCNC: ABNORMAL MG/DL
BILIRUB SERPL-MCNC: 0.4 MG/DL (ref 0–1.2)
BUN SERPL-MCNC: 22 MG/DL (ref 8–23)
BUN/CREAT SERPL: 22.9 (ref 7–25)
CALCIUM SPEC-SCNC: 9.4 MG/DL (ref 8.6–10.5)
CHLORIDE SERPL-SCNC: 106 MMOL/L (ref 98–107)
CO2 SERPL-SCNC: 25.7 MMOL/L (ref 22–29)
CREAT SERPL-MCNC: 0.96 MG/DL (ref 0.57–1)
CRP SERPL-MCNC: 4.25 MG/DL (ref 0–0.5)
DEPRECATED RDW RBC AUTO: 46.5 FL (ref 37–54)
EGFRCR SERPLBLD CKD-EPI 2021: 64.2 ML/MIN/1.73
ERYTHROCYTE [DISTWIDTH] IN BLOOD BY AUTOMATED COUNT: 15.2 % (ref 12.3–15.4)
GLUCOSE SERPL-MCNC: 96 MG/DL (ref 65–99)
HCT VFR BLD AUTO: 31.8 % (ref 34–46.6)
HGB BLD-MCNC: 10.4 G/DL (ref 12–15.9)
INR PPP: 1.19 (ref 0.9–1.1)
MCH RBC QN AUTO: 27.4 PG (ref 26.6–33)
MCHC RBC AUTO-ENTMCNC: 32.7 G/DL (ref 31.5–35.7)
MCV RBC AUTO: 83.9 FL (ref 79–97)
PHOSPHATE SERPL-MCNC: 2.5 MG/DL (ref 2.5–4.5)
PLATELET # BLD AUTO: 153 10*3/MM3 (ref 140–450)
PMV BLD AUTO: 10.3 FL (ref 6–12)
POTASSIUM SERPL-SCNC: 4 MMOL/L (ref 3.5–5.2)
PROT SERPL-MCNC: 6.4 G/DL (ref 6–8.5)
PROTHROMBIN TIME: 15.2 SECONDS (ref 11.7–14.2)
QT INTERVAL: 425 MS
RBC # BLD AUTO: 3.79 10*6/MM3 (ref 3.77–5.28)
SODIUM SERPL-SCNC: 137 MMOL/L (ref 136–145)
WBC NRBC COR # BLD: 3.48 10*3/MM3 (ref 3.4–10.8)

## 2022-12-29 PROCEDURE — 80076 HEPATIC FUNCTION PANEL: CPT | Performed by: STUDENT IN AN ORGANIZED HEALTH CARE EDUCATION/TRAINING PROGRAM

## 2022-12-29 PROCEDURE — 25010000002 REMDESIVIR 100 MG/20ML SOLUTION 1 EACH VIAL: Performed by: INTERNAL MEDICINE

## 2022-12-29 PROCEDURE — 25010000002 HYDROMORPHONE PER 4 MG: Performed by: INTERNAL MEDICINE

## 2022-12-29 PROCEDURE — 93005 ELECTROCARDIOGRAM TRACING: CPT | Performed by: STUDENT IN AN ORGANIZED HEALTH CARE EDUCATION/TRAINING PROGRAM

## 2022-12-29 PROCEDURE — 25010000002 PIPERACILLIN SOD-TAZOBACTAM PER 1 G: Performed by: STUDENT IN AN ORGANIZED HEALTH CARE EDUCATION/TRAINING PROGRAM

## 2022-12-29 PROCEDURE — 63710000001 TACROLIMUS PER 1 MG: Performed by: INTERNAL MEDICINE

## 2022-12-29 PROCEDURE — 84100 ASSAY OF PHOSPHORUS: CPT | Performed by: INTERNAL MEDICINE

## 2022-12-29 PROCEDURE — 88305 TISSUE EXAM BY PATHOLOGIST: CPT | Performed by: INTERNAL MEDICINE

## 2022-12-29 PROCEDURE — 63710000001 DEXAMETHASONE PER 0.25 MG: Performed by: STUDENT IN AN ORGANIZED HEALTH CARE EDUCATION/TRAINING PROGRAM

## 2022-12-29 PROCEDURE — 85027 COMPLETE CBC AUTOMATED: CPT | Performed by: STUDENT IN AN ORGANIZED HEALTH CARE EDUCATION/TRAINING PROGRAM

## 2022-12-29 PROCEDURE — 80048 BASIC METABOLIC PNL TOTAL CA: CPT | Performed by: INTERNAL MEDICINE

## 2022-12-29 PROCEDURE — 63710000001 MYCOPHENOLATE MOFETIL PER 250 MG: Performed by: STUDENT IN AN ORGANIZED HEALTH CARE EDUCATION/TRAINING PROGRAM

## 2022-12-29 PROCEDURE — 25010000002 ENOXAPARIN PER 10 MG: Performed by: INTERNAL MEDICINE

## 2022-12-29 PROCEDURE — 25010000002 PROPOFOL 10 MG/ML EMULSION: Performed by: ANESTHESIOLOGY

## 2022-12-29 PROCEDURE — 85610 PROTHROMBIN TIME: CPT | Performed by: INTERNAL MEDICINE

## 2022-12-29 PROCEDURE — 0DB68ZX EXCISION OF STOMACH, VIA NATURAL OR ARTIFICIAL OPENING ENDOSCOPIC, DIAGNOSTIC: ICD-10-PCS | Performed by: INTERNAL MEDICINE

## 2022-12-29 PROCEDURE — 86140 C-REACTIVE PROTEIN: CPT | Performed by: STUDENT IN AN ORGANIZED HEALTH CARE EDUCATION/TRAINING PROGRAM

## 2022-12-29 PROCEDURE — 93010 ELECTROCARDIOGRAM REPORT: CPT | Performed by: INTERNAL MEDICINE

## 2022-12-29 PROCEDURE — 63710000001 TACROLIMUS PER 1 MG: Performed by: STUDENT IN AN ORGANIZED HEALTH CARE EDUCATION/TRAINING PROGRAM

## 2022-12-29 PROCEDURE — 43239 EGD BIOPSY SINGLE/MULTIPLE: CPT | Performed by: INTERNAL MEDICINE

## 2022-12-29 RX ORDER — LIDOCAINE HYDROCHLORIDE 20 MG/ML
INJECTION, SOLUTION INFILTRATION; PERINEURAL AS NEEDED
Status: DISCONTINUED | OUTPATIENT
Start: 2022-12-29 | End: 2022-12-29 | Stop reason: SURG

## 2022-12-29 RX ORDER — PROPOFOL 10 MG/ML
VIAL (ML) INTRAVENOUS AS NEEDED
Status: DISCONTINUED | OUTPATIENT
Start: 2022-12-29 | End: 2022-12-29 | Stop reason: SURG

## 2022-12-29 RX ORDER — SODIUM CHLORIDE, SODIUM LACTATE, POTASSIUM CHLORIDE, CALCIUM CHLORIDE 600; 310; 30; 20 MG/100ML; MG/100ML; MG/100ML; MG/100ML
INJECTION, SOLUTION INTRAVENOUS CONTINUOUS PRN
Status: DISCONTINUED | OUTPATIENT
Start: 2022-12-29 | End: 2022-12-29 | Stop reason: SURG

## 2022-12-29 RX ORDER — SODIUM CHLORIDE 0.9 % (FLUSH) 0.9 %
10 SYRINGE (ML) INJECTION AS NEEDED
Status: DISCONTINUED | OUTPATIENT
Start: 2022-12-29 | End: 2022-12-29

## 2022-12-29 RX ORDER — PROPOFOL 10 MG/ML
VIAL (ML) INTRAVENOUS CONTINUOUS PRN
Status: DISCONTINUED | OUTPATIENT
Start: 2022-12-29 | End: 2022-12-29 | Stop reason: SURG

## 2022-12-29 RX ORDER — SODIUM CHLORIDE 9 MG/ML
1000 INJECTION, SOLUTION INTRAVENOUS CONTINUOUS
Status: DISCONTINUED | OUTPATIENT
Start: 2022-12-29 | End: 2022-12-29

## 2022-12-29 RX ORDER — AMOXICILLIN AND CLAVULANATE POTASSIUM 875; 125 MG/1; MG/1
1 TABLET, FILM COATED ORAL EVERY 12 HOURS SCHEDULED
Status: DISCONTINUED | OUTPATIENT
Start: 2022-12-29 | End: 2022-12-31 | Stop reason: HOSPADM

## 2022-12-29 RX ADMIN — Medication 100 MCG/KG/MIN: at 13:56

## 2022-12-29 RX ADMIN — REMDESIVIR 100 MG: 100 INJECTION, POWDER, LYOPHILIZED, FOR SOLUTION INTRAVENOUS at 17:35

## 2022-12-29 RX ADMIN — OXYCODONE HYDROCHLORIDE AND ACETAMINOPHEN 250 MG: 500 TABLET ORAL at 15:03

## 2022-12-29 RX ADMIN — Medication 10 ML: at 21:17

## 2022-12-29 RX ADMIN — LIDOCAINE HYDROCHLORIDE 100 MG: 20 INJECTION, SOLUTION INFILTRATION; PERINEURAL at 13:50

## 2022-12-29 RX ADMIN — FERROUS SULFATE TAB 325 MG (65 MG ELEMENTAL FE) 325 MG: 325 (65 FE) TAB at 17:36

## 2022-12-29 RX ADMIN — HYDROMORPHONE HYDROCHLORIDE 0.5 MG: 1 INJECTION, SOLUTION INTRAMUSCULAR; INTRAVENOUS; SUBCUTANEOUS at 17:36

## 2022-12-29 RX ADMIN — MYCOPHENOLATE MOFETIL 250 MG: 250 CAPSULE ORAL at 09:22

## 2022-12-29 RX ADMIN — ENOXAPARIN SODIUM 40 MG: 100 INJECTION SUBCUTANEOUS at 17:35

## 2022-12-29 RX ADMIN — TAZOBACTAM SODIUM AND PIPERACILLIN SODIUM 3.38 G: 375; 3 INJECTION, SOLUTION INTRAVENOUS at 13:00

## 2022-12-29 RX ADMIN — TAZOBACTAM SODIUM AND PIPERACILLIN SODIUM 3.38 G: 375; 3 INJECTION, SOLUTION INTRAVENOUS at 04:45

## 2022-12-29 RX ADMIN — SODIUM CHLORIDE 50 ML/HR: 9 INJECTION, SOLUTION INTRAVENOUS at 04:45

## 2022-12-29 RX ADMIN — SODIUM CHLORIDE, POTASSIUM CHLORIDE, SODIUM LACTATE AND CALCIUM CHLORIDE: 600; 310; 30; 20 INJECTION, SOLUTION INTRAVENOUS at 13:50

## 2022-12-29 RX ADMIN — TACROLIMUS 1 MG: 1 CAPSULE ORAL at 09:04

## 2022-12-29 RX ADMIN — TACROLIMUS 1 MG: 1 CAPSULE ORAL at 21:17

## 2022-12-29 RX ADMIN — PROPOFOL 100 MG: 10 INJECTION, EMULSION INTRAVENOUS at 13:52

## 2022-12-29 RX ADMIN — DEXAMETHASONE 6 MG: 4 TABLET ORAL at 15:03

## 2022-12-29 RX ADMIN — NADOLOL 20 MG: 20 TABLET ORAL at 09:04

## 2022-12-29 RX ADMIN — AMOXICILLIN AND CLAVULANATE POTASSIUM 1 TABLET: 875; 125 TABLET, FILM COATED ORAL at 21:17

## 2022-12-29 NOTE — ANESTHESIA PREPROCEDURE EVALUATION
Anesthesia Evaluation     Patient summary reviewed and Nursing notes reviewed   NPO Solid Status: > 8 hours  NPO Liquid Status: > 4 hours           Airway   Mallampati: II  Neck ROM: full  No difficulty expected  Dental - normal exam     Pulmonary     breath sounds clear to auscultation  Cardiovascular     Rhythm: regular    (+) hypertension,       Neuro/Psych  GI/Hepatic/Renal/Endo    (+)   liver disease, renal disease,     ROS Comment: Renal transplant    Musculoskeletal     Abdominal    Substance History      OB/GYN          Other          Other Comment: covid +                  Anesthesia Plan    ASA 3     MAC     intravenous induction     Anesthetic plan, risks, benefits, and alternatives have been provided, discussed and informed consent has been obtained with: patient.        CODE STATUS:    Level Of Support Discussed With: Patient  Code Status (Patient has no pulse and is not breathing): CPR (Attempt to Resuscitate)  Medical Interventions (Patient has pulse or is breathing): Full Support  Release to patient: Routine Release

## 2022-12-29 NOTE — PROGRESS NOTES
[facility] Progress Note      Patient Name: Jacey Valdez  : 1955  MRN: [id number]  Primary Care Physician:  Morales Alan MD  Date of admission: 2022    Subjective     Interval History:   Follow up LNRKT .  Taking bland diet.  NPO now for EGD.  Having diarrhea started yesterday. Urinating.  Usual weight 189.  Today 194.  Not soa. Off o2. No cough. No abdominal pain .    Review of Systems:   As noted above    Objective     Vitals:   Temp:  [97 °F (36.1 °C)-97.9 °F (36.6 °C)] 97.5 °F (36.4 °C)  Heart Rate:  [59-67] 63  Resp:  [18] 18  BP: (115-128)/(73-86) 121/86  Flow (L/min):  [2] 2    Intake/Output Summary (Last 24 hours) at 2022 1021  Last data filed at 2022  Gross per 24 hour   Intake 360 ml   Output --   Net 360 ml       Physical Exam:    General Appearance: alert, oriented x 3, no acute distress   Skin: warm and dry  HEENT: oral mucosa dry. nonicteric sclera  Neck: supple, no JVD  Lungs: Clear to auscultation. Unlabored on RA  Heart: RRR, normal S1 and S2  Abdomen: soft, nontender, non-distended.+ bs, KT palpable RLQ   Extremities: 1+ lower ext edema.   Neuro: normal speech and mental status     Scheduled Meds:     ascorbic acid, 250 mg, Oral, Daily  dexamethasone, 6 mg, Oral, Daily With Breakfast  enoxaparin, 40 mg, Subcutaneous, Q24H  ferrous sulfate, 325 mg, Oral, BID With Meals  mycophenolate, 250 mg, Oral, QAM  nadolol, 20 mg, Oral, Daily  pantoprazole, 40 mg, Oral, Q AM  piperacillin-tazobactam, 3.375 g, Intravenous, Q8H  remdesivir, 100 mg, Intravenous, Q24H  sodium chloride, 10 mL, Intravenous, Q12H  tacrolimus, 1 mg, Oral, BID      IV Meds:   sodium chloride, 50 mL/hr, Last Rate: 50 mL/hr (22 3720)        Results Reviewed:   I have personally reviewed the results from the time of this admission to 2022 10:21 EST     Results from last 7 days   Lab Units 22  0917 22  1724 22  0945 22  1755   SODIUM  mmol/L 138  --  138 138   POTASSIUM mmol/L 4.4  --  4.0 3.9   CHLORIDE mmol/L 105  --  107 107   CO2 mmol/L 25.0  --  24.6 23.7   BUN mg/dL 19  --  17 19   CREATININE mg/dL 0.92 0.90 0.85 0.91   CALCIUM mg/dL 9.4  --  8.9 9.3   BILIRUBIN mg/dL 0.3 0.4  --  0.7   ALK PHOS U/L 78 76  --  89   ALT (SGPT) U/L 19 24  --  29   AST (SGOT) U/L 24 26  --  37*   GLUCOSE mg/dL 112*  --  124* 137*       Estimated Creatinine Clearance: 70.8 mL/min (by C-G formula based on SCr of 0.92 mg/dL).    Results from last 7 days   Lab Units 12/28/22  0917 12/27/22  0945   MAGNESIUM mg/dL 1.7 1.8   PHOSPHORUS mg/dL 3.2 2.3*             Results from last 7 days   Lab Units 12/28/22  0917 12/27/22  0945 12/26/22  1755   WBC 10*3/mm3 2.00* 3.78 7.03   HEMOGLOBIN g/dL 9.8* 9.7* 10.6*   PLATELETS 10*3/mm3 132* 105* 145             Assessment / Plan     ASSESSMENT:  1. SP LNRKT.  Very stable creatinine. Mild volume excess by exam.  Stop IVF after EGD. On cellcept, Tacrolimus.    Phos replete. DC oral phos supplement as this can cause diarrhea.  2. COVID 19. Multifocal PNA.  On remdesivir day 2 and decadron.  3. Acalculous cholecystitis.  On Zosyn. Not a surgical candidate due to cirrhosis.  4. Duodenitis, thickened jejunum.  EGD today.   5. Cirrhosis with portal HTN.   6. Diarrhea.  Likely due to po phosphorous replacement but also on Zosyn and cellcept.  Watch closely.   7. Pancytopenia.  Check CBC today.  May need to hold cellcept. RN to call labs to me.  PLAN:  1.  Dc phosphorous po.  2. DC IVF after EGD  3. Follow up on labs today.   RN to call results.   4. DW .  Mariela Smith MD  12/29/22  10:21 EST    [facility]  [phone]

## 2022-12-29 NOTE — PLAN OF CARE
Goal Outcome Evaluation:  Plan of Care Reviewed With: patient           Outcome Evaluation: medicated x1 for mild right sided pain.  No nausea.  EGD today.  on RA.  Frequent congested cough

## 2022-12-29 NOTE — CONSULTS
I was requested to see the patient regarding an Advance Directive.  Patient only wanted information.  I provided information and AD form to take home.

## 2022-12-29 NOTE — PLAN OF CARE
Problem: Adult Inpatient Plan of Care  Goal: Plan of Care Review  Outcome: Ongoing, Progressing  Goal: Patient-Specific Goal (Individualized)  Outcome: Ongoing, Progressing  Goal: Absence of Hospital-Acquired Illness or Injury  Outcome: Ongoing, Progressing  Intervention: Identify and Manage Fall Risk  Recent Flowsheet Documentation  Taken 12/29/2022 0000 by Aida Rich RN  Safety Promotion/Fall Prevention:   safety round/check completed   activity supervised   assistive device/personal items within reach   clutter free environment maintained   lighting adjusted   nonskid shoes/slippers when out of bed  Taken 12/28/2022 2200 by Aida Rich RN  Safety Promotion/Fall Prevention:   activity supervised   assistive device/personal items within reach   clutter free environment maintained   fall prevention program maintained   lighting adjusted   nonskid shoes/slippers when out of bed   room organization consistent   safety round/check completed   toileting scheduled  Taken 12/28/2022 2000 by Aida Rich RN  Safety Promotion/Fall Prevention:   activity supervised   assistive device/personal items within reach   clutter free environment maintained   fall prevention program maintained   lighting adjusted   nonskid shoes/slippers when out of bed   room organization consistent   safety round/check completed   toileting scheduled  Intervention: Prevent Skin Injury  Recent Flowsheet Documentation  Taken 12/29/2022 0000 by Aida Rich RN  Body Position:   position changed independently   supine  Taken 12/28/2022 2200 by Aida Rich RN  Body Position:   position changed independently   supine  Taken 12/28/2022 2000 by Aida Rich RN  Body Position:   position changed independently   sitting up in bed  Intervention: Prevent and Manage VTE (Venous Thromboembolism) Risk  Recent Flowsheet Documentation  Taken 12/29/2022 0000 by Aida Rich RN  Activity Management: activity adjusted per  tolerance  Taken 12/28/2022 2200 by Aida Rich, RN  Activity Management: activity adjusted per tolerance  Taken 12/28/2022 2000 by Aida Rich RN  Activity Management: activity adjusted per tolerance  Intervention: Prevent Infection  Recent Flowsheet Documentation  Taken 12/29/2022 0000 by Aida Rich RN  Infection Prevention:   visitors restricted/screened   single patient room provided   rest/sleep promoted   personal protective equipment utilized   hand hygiene promoted   environmental surveillance performed  Taken 12/28/2022 2200 by Aida Rich RN  Infection Prevention:   visitors restricted/screened   single patient room provided   rest/sleep promoted   hand hygiene promoted   personal protective equipment utilized  Taken 12/28/2022 2000 by Aida Rich RN  Infection Prevention:   visitors restricted/screened   single patient room provided   rest/sleep promoted   personal protective equipment utilized   hand hygiene promoted  Goal: Optimal Comfort and Wellbeing  Outcome: Ongoing, Progressing  Goal: Readiness for Transition of Care  Outcome: Ongoing, Progressing     Problem: Fall Injury Risk  Goal: Absence of Fall and Fall-Related Injury  Outcome: Ongoing, Progressing  Intervention: Promote Injury-Free Environment  Recent Flowsheet Documentation  Taken 12/29/2022 0000 by Aida Rich RN  Safety Promotion/Fall Prevention:   safety round/check completed   activity supervised   assistive device/personal items within reach   clutter free environment maintained   lighting adjusted   nonskid shoes/slippers when out of bed  Taken 12/28/2022 2200 by Aida Rich RN  Safety Promotion/Fall Prevention:   activity supervised   assistive device/personal items within reach   clutter free environment maintained   fall prevention program maintained   lighting adjusted   nonskid shoes/slippers when out of bed   room organization consistent   safety round/check completed   toileting  scheduled  Taken 12/28/2022 2000 by Aida Rich, RN  Safety Promotion/Fall Prevention:   activity supervised   assistive device/personal items within reach   clutter free environment maintained   fall prevention program maintained   lighting adjusted   nonskid shoes/slippers when out of bed   room organization consistent   safety round/check completed   toileting scheduled     Problem: Pain Acute  Goal: Acceptable Pain Control and Functional Ability  Outcome: Ongoing, Progressing   Goal Outcome Evaluation:

## 2022-12-29 NOTE — PROGRESS NOTES
Patient in endoscopy undergoing upper endoscopy for duodenitis when I tried to evaluate her.  Discussed with Dr. Grove and recommend a total 14-day course of antibiotics for presumed acalculus cholecystitis given immunosuppressive state.  Should her symptoms not continue to improve with antibiotic therapy alone then cholecystostomy tube would be needed.

## 2022-12-29 NOTE — NURSING NOTE
Patient on room air and doing great. HR did shoot up into the 130s upon ambulation into the bathroom. After several minutes it was WNL. Currently eating some toast before she is NPO at midnight for her procedure. Patient not c/o any pain. States she feels much better.  at bedside. Call light within reach. Will continue to monitor.

## 2022-12-29 NOTE — ANESTHESIA POSTPROCEDURE EVALUATION
"Patient: Jacey Valdez    Procedure Summary     Date: 12/29/22 Room / Location:  [facility] /  [facility]    Anesthesia Start: 1349 Anesthesia Stop: 1406    Procedure: ESOPHAGOGASTRODUODENOSCOPY with biopsies (Esophagus) Diagnosis:       Duodenitis      (Duodenitis [K29.80])    Surgeons: Kristin Thao MD Provider: Anders Mcneal MD    Anesthesia Type: MAC ASA Status: 3          Anesthesia Type: MAC    Vitals  Vitals Value Taken Time   /88 12/29/22 1428   Temp     Pulse 69 12/29/22 1428   Resp 12 12/29/22 1428   SpO2 92 % 12/29/22 1428           Post Anesthesia Care and Evaluation    Patient location during evaluation: PHASE II  Patient participation: complete - patient participated  Level of consciousness: awake and alert  Pain management: adequate    Airway patency: patent  Anesthetic complications: No anesthetic complications    Cardiovascular status: acceptable  Respiratory status: acceptable  Hydration status: acceptable    Comments: /88 (BP Location: Left arm, Patient Position: Sitting)   Pulse 69   Temp 36.4 °C (97.5 °F) (Oral)   Resp 12   Ht 180.3 cm (71\")   Wt 88.1 kg (194 lb 4.8 oz)   SpO2 92%   BMI 27.10 kg/m²       "

## 2022-12-30 LAB
ALBUMIN SERPL-MCNC: 2.6 G/DL (ref 3.5–5.2)
ALBUMIN/GLOB SERPL: 0.7 G/DL
ALP SERPL-CCNC: 71 U/L (ref 39–117)
ALT SERPL W P-5'-P-CCNC: 22 U/L (ref 1–33)
ANION GAP SERPL CALCULATED.3IONS-SCNC: 8.3 MMOL/L (ref 5–15)
AST SERPL-CCNC: 30 U/L (ref 1–32)
BILIRUB CONJ SERPL-MCNC: <0.2 MG/DL (ref 0–0.3)
BILIRUB SERPL-MCNC: 0.3 MG/DL (ref 0–1.2)
BUN SERPL-MCNC: 23 MG/DL (ref 8–23)
BUN/CREAT SERPL: 26.7 (ref 7–25)
C DIFF TOX GENS STL QL NAA+PROBE: NEGATIVE
CALCIUM SPEC-SCNC: 9.1 MG/DL (ref 8.6–10.5)
CHLORIDE SERPL-SCNC: 107 MMOL/L (ref 98–107)
CO2 SERPL-SCNC: 22.7 MMOL/L (ref 22–29)
CREAT SERPL-MCNC: 0.86 MG/DL (ref 0.57–1)
CRP SERPL-MCNC: 2.8 MG/DL (ref 0–0.5)
DEPRECATED RDW RBC AUTO: 46.8 FL (ref 37–54)
EGFRCR SERPLBLD CKD-EPI 2021: 73.2 ML/MIN/1.73
ERYTHROCYTE [DISTWIDTH] IN BLOOD BY AUTOMATED COUNT: 15.5 % (ref 12.3–15.4)
GLOBULIN UR ELPH-MCNC: 3.8 GM/DL
GLUCOSE SERPL-MCNC: 109 MG/DL (ref 65–99)
HCT VFR BLD AUTO: 33.1 % (ref 34–46.6)
HGB BLD-MCNC: 10.2 G/DL (ref 12–15.9)
LAB AP CASE REPORT: NORMAL
MAGNESIUM SERPL-MCNC: 1.7 MG/DL (ref 1.6–2.4)
MCH RBC QN AUTO: 25.9 PG (ref 26.6–33)
MCHC RBC AUTO-ENTMCNC: 30.8 G/DL (ref 31.5–35.7)
MCV RBC AUTO: 84 FL (ref 79–97)
PATH REPORT.FINAL DX SPEC: NORMAL
PATH REPORT.GROSS SPEC: NORMAL
PHOSPHATE SERPL-MCNC: 2.8 MG/DL (ref 2.5–4.5)
PLATELET # BLD AUTO: 172 10*3/MM3 (ref 140–450)
PMV BLD AUTO: 10.7 FL (ref 6–12)
POTASSIUM SERPL-SCNC: 4.2 MMOL/L (ref 3.5–5.2)
PROT SERPL-MCNC: 6.4 G/DL (ref 6–8.5)
RBC # BLD AUTO: 3.94 10*6/MM3 (ref 3.77–5.28)
SODIUM SERPL-SCNC: 138 MMOL/L (ref 136–145)
WBC NRBC COR # BLD: 3.43 10*3/MM3 (ref 3.4–10.8)

## 2022-12-30 PROCEDURE — 99232 SBSQ HOSP IP/OBS MODERATE 35: CPT | Performed by: SURGERY

## 2022-12-30 PROCEDURE — 85027 COMPLETE CBC AUTOMATED: CPT | Performed by: INTERNAL MEDICINE

## 2022-12-30 PROCEDURE — 63710000001 DEXAMETHASONE PER 0.25 MG: Performed by: INTERNAL MEDICINE

## 2022-12-30 PROCEDURE — 82248 BILIRUBIN DIRECT: CPT | Performed by: INTERNAL MEDICINE

## 2022-12-30 PROCEDURE — 83735 ASSAY OF MAGNESIUM: CPT | Performed by: INTERNAL MEDICINE

## 2022-12-30 PROCEDURE — 86140 C-REACTIVE PROTEIN: CPT | Performed by: INTERNAL MEDICINE

## 2022-12-30 PROCEDURE — 63710000001 MYCOPHENOLATE MOFETIL PER 250 MG: Performed by: INTERNAL MEDICINE

## 2022-12-30 PROCEDURE — 25010000002 REMDESIVIR 100 MG/20ML SOLUTION 1 EACH VIAL: Performed by: INTERNAL MEDICINE

## 2022-12-30 PROCEDURE — 63710000001 TACROLIMUS PER 1 MG: Performed by: INTERNAL MEDICINE

## 2022-12-30 PROCEDURE — 99231 SBSQ HOSP IP/OBS SF/LOW 25: CPT | Performed by: INTERNAL MEDICINE

## 2022-12-30 PROCEDURE — 25010000002 ENOXAPARIN PER 10 MG: Performed by: INTERNAL MEDICINE

## 2022-12-30 PROCEDURE — 87493 C DIFF AMPLIFIED PROBE: CPT | Performed by: STUDENT IN AN ORGANIZED HEALTH CARE EDUCATION/TRAINING PROGRAM

## 2022-12-30 PROCEDURE — 84100 ASSAY OF PHOSPHORUS: CPT | Performed by: INTERNAL MEDICINE

## 2022-12-30 PROCEDURE — 80053 COMPREHEN METABOLIC PANEL: CPT | Performed by: INTERNAL MEDICINE

## 2022-12-30 RX ADMIN — ENOXAPARIN SODIUM 40 MG: 100 INJECTION SUBCUTANEOUS at 17:25

## 2022-12-30 RX ADMIN — AMOXICILLIN AND CLAVULANATE POTASSIUM 1 TABLET: 875; 125 TABLET, FILM COATED ORAL at 20:43

## 2022-12-30 RX ADMIN — FERROUS SULFATE TAB 325 MG (65 MG ELEMENTAL FE) 325 MG: 325 (65 FE) TAB at 17:25

## 2022-12-30 RX ADMIN — AMOXICILLIN AND CLAVULANATE POTASSIUM 1 TABLET: 875; 125 TABLET, FILM COATED ORAL at 09:56

## 2022-12-30 RX ADMIN — DEXAMETHASONE 6 MG: 4 TABLET ORAL at 09:56

## 2022-12-30 RX ADMIN — NADOLOL 20 MG: 20 TABLET ORAL at 09:55

## 2022-12-30 RX ADMIN — OXYCODONE HYDROCHLORIDE AND ACETAMINOPHEN 250 MG: 500 TABLET ORAL at 09:55

## 2022-12-30 RX ADMIN — Medication 10 ML: at 17:25

## 2022-12-30 RX ADMIN — MYCOPHENOLATE MOFETIL 250 MG: 250 CAPSULE ORAL at 06:42

## 2022-12-30 RX ADMIN — TACROLIMUS 1 MG: 1 CAPSULE ORAL at 09:56

## 2022-12-30 RX ADMIN — Medication 10 ML: at 20:43

## 2022-12-30 RX ADMIN — TACROLIMUS 1 MG: 1 CAPSULE ORAL at 20:43

## 2022-12-30 RX ADMIN — REMDESIVIR 100 MG: 100 INJECTION, POWDER, LYOPHILIZED, FOR SOLUTION INTRAVENOUS at 17:25

## 2022-12-30 RX ADMIN — Medication 10 ML: at 09:55

## 2022-12-30 RX ADMIN — PANTOPRAZOLE SODIUM 40 MG: 40 TABLET, DELAYED RELEASE ORAL at 06:42

## 2022-12-30 RX ADMIN — FERROUS SULFATE TAB 325 MG (65 MG ELEMENTAL FE) 325 MG: 325 (65 FE) TAB at 10:03

## 2022-12-30 NOTE — PROGRESS NOTES
IMPRESSION & PLAN:  67-year-old lady with acalculous cholecystitis.  Abdominal pain has resolved.  Recommend 14-day total course of treatment given immunosuppressed state.  Counseled the patient that if she had recurrent fevers or return of pain that she should present to the emergency room.  If she is doing well she does not need to follow-up with me as an outpatient and can continue to follow-up with her current physicians.  I will sign off.  Please do not hesitate to reach out to our group if we can be of further assistance.    CC:    Chief Complaint   Patient presents with   • Abdominal Pain         HPI: Pain is resolved.  Tolerating regular diet.  Feels like the diarrhea is improving today.      ROS:   Constitutional: No fever, no chills  CV:  No chest pain. No palpitations.   Lungs:  No shortness of breath or cough.   GI:  No nausea, no vomiting, no diarrhea, no constipation, no anorexia.       PE:    VS:   Vitals:    12/30/22 0724   BP: 123/75   Pulse: 67   Resp: 16   Temp: 97.4 °F (36.3 °C)   SpO2: 97%      CONST: Awake, alert  LUNGS: symmetric excursion, normal inspiratory effort  CV: regular rate, well perfused  Abdomen:  Soft, nontender    LABS:  WBC 3.43, CRP 2.8 from 4 from 8.9

## 2022-12-30 NOTE — PLAN OF CARE
Goal Outcome Evaluation:               Patient this morning verbalized discomfort because she had diarrhea twice in the evening and twice in the morning. Patient is breathing unlabored and is up ab caesar without assistance. Vitals stable. This afternoon patient stated she feels better but is still having diarrhea. Stool sample was collected and awaiting results.

## 2022-12-30 NOTE — PROGRESS NOTES
Name: Jacey Valdez ADMIT: 2022   : 1955  PCP: Morales Alan MD    MRN: [id number] LOS: 4 days   AGE/SEX: 67 y.o. female  ROOM: [facility]     Subjective   Subjective     She's doing fairly well today and remains off oxygen, but she continues to have diarrhea.        Objective   Objective   Vital Signs  Temp:  [97.4 °F (36.3 °C)-98 °F (36.7 °C)] 98 °F (36.7 °C)  Heart Rate:  [60-73] 60  Resp:  [16-18] 18  BP: (114-137)/(75-89) 134/79  SpO2:  [92 %-98 %] 94 %  on  Flow (L/min):  [2] 2;   Device (Oxygen Therapy): nasal cannula  Body mass index is 27.28 kg/m².  Physical Exam  Constitutional:       General: She is not in acute distress.     Appearance: She is not toxic-appearing.   Cardiovascular:      Rate and Rhythm: Normal rate and regular rhythm.      Heart sounds: Normal heart sounds.   Pulmonary:      Effort: Pulmonary effort is normal. No respiratory distress.      Breath sounds: Normal breath sounds. No wheezing, rhonchi or rales.   Abdominal:      General: Bowel sounds are normal. There is no distension.      Palpations: Abdomen is soft.      Tenderness: There is no abdominal tenderness. There is no guarding or rebound.   Musculoskeletal:         General: No tenderness.      Right lower leg: No edema.      Left lower leg: No edema.   Neurological:      Mental Status: She is alert.   Psychiatric:         Mood and Affect: Mood normal.         Behavior: Behavior normal.         Results Review     I reviewed the patient's new clinical results.  Results from last 7 days   Lab Units 22  0734 22  1122 22  0917 22  0945   WBC 10*3/mm3 3.43 3.48 2.00* 3.78   HEMOGLOBIN g/dL 10.2* 10.4* 9.8* 9.7*   PLATELETS 10*3/mm3 172 153 132* 105*     Results from last 7 days   Lab Units 22  0734 22  1122 22  0917 22  1724 22  0945   SODIUM mmol/L 138 137 138  --  138   POTASSIUM mmol/L 4.2 4.0 4.4  --  4.0   CHLORIDE mmol/L 107 106 105  --  107   CO2 mmol/L  22.7 25.7 25.0  --  24.6   BUN mg/dL 23 22 19  --  17   CREATININE mg/dL 0.86 0.96 0.92 0.90 0.85   GLUCOSE mg/dL 109* 96 112*  --  124*   Estimated Creatinine Clearance: 76 mL/min (by C-G formula based on SCr of 0.86 mg/dL).  Results from last 7 days   Lab Units 12/30/22  0734 12/29/22  1122 12/28/22  0917 12/27/22  1724   ALBUMIN g/dL 2.6* 2.7* 2.5* 2.5*   BILIRUBIN mg/dL 0.3 0.4 0.3 0.4   ALK PHOS U/L 71 73 78 76   AST (SGOT) U/L 30 27 24 26   ALT (SGPT) U/L 22 22 19 24     Results from last 7 days   Lab Units 12/30/22  0734 12/29/22  1122 12/28/22  0917 12/27/22  1724 12/27/22  0945   CALCIUM mg/dL 9.1 9.4 9.4  --  8.9   ALBUMIN g/dL 2.6* 2.7* 2.5* 2.5*  --    MAGNESIUM mg/dL 1.7  --  1.7  --  1.8   PHOSPHORUS mg/dL 2.8 2.5 3.2  --  2.3*     Results from last 7 days   Lab Units 12/26/22  1755   PROCALCITONIN ng/mL 0.26*   LACTATE mmol/L 1.0     COVID19   Date Value Ref Range Status   12/27/2022 Detected (C) Not Detected - Ref. Range Final     No results found for: HGBA1C, POCGLU    CT Abdomen Pelvis With Contrast  CT ABDOMEN PELVIS W CONTRAST-     Radiation dose reduction techniques were utilized, including automated  exposure control and exposure modulation based on body size.           Clinical: Right upper quadrant pain, febrile     Correlation with gallbladder ultrasound earlier at 1831 hours.     FINDINGS: There is mild thickening and irregularity of the gallbladder  wall which was not appreciated on the ultrasound examination.  Potentially calculus cholecystitis. CBD is dilated, 11 mm in diameter.  This smoothly tapers to the ampulla. No stone demonstrated. Possible  small diverticulum at this location measuring 13 mm, partially filled  with gas.     The liver is grossly enlarged and heterogenous in attenuation with  superficial irregularity and a nodular appearance consistent with  cirrhosis. Areas of diminished attenuation likely fatty infiltration.  Underlying diffuse hepatocellular disease or neoplasm  possible. The  spleen is enlarged and there is extensive collateral vessel formation  throughout the upper abdomen with recannulization of the umbilical vein  consistent with portal hypertension. Within the right lobe of the liver  there is a 14 mm area of nodularity having the appearance of a cyst. The  pancreas is satisfactory in appearance.     Both adrenal glands are normal. The native kidneys are small and there  is nephrocalcinosis. There is 2 cm right renal cyst. Right pelvic renal  transplant demonstrates a normal pattern of enhancement, no obstructive  uropathy mass or calculus. There is wall thickening demonstrated along  the right lateral border of the urinary bladder. There is some serosal  spiculation at this location. Cystitis not excluded. Tiny gas bubble is  noted within, has a been recent catheterization?     There is diverticulosis of the colon. No indication of diverticulitis.  Within the midabdomen there is a thick-walled segment of jejunum  measuring approximately 12 cm in length. This consistent with enteritis.  The stomach is collapsed. Duodenal bulb is typical in appearance. There  is some fold thickening and irregularity of the second third and fourth  portions of the duodenum suggestive of duodenitis.     No free air or free intraperitoneal fluid. The uterus is small in size,  appropriate for age, no ovarian abnormality. There are Tarlov cysts.     Conclusion:  1. The liver is grossly abnormal, it is enlarged with surface  irregularity and heterogenous. The appearance is consistent with  cirrhosis with likely fatty infiltration. Underlying hepatitis or  hepatocellular carcinoma not excluded. There is splenomegaly and  extensive collateral vessel formation consistent with portal  hypertension.  2. There is gallbladder wall thickening and irregularity, possible  acalculus cholecystitis. CBD is dilated at 11 mm with smooth tapering to  the ampulla. No definite stone identified. At this  location there  appears to be a partially gas-filled diverticulum.  3. Renal transplant is satisfactory in appearance. There is asymmetric  wall thickening of the right bladder with serosal irregularity/edema  worrisome for cystitis.  4. Long segment of jejunum which demonstrates wall thickening. In  addition there is fold irregularity of the duodenum, findings suggest  enteritis/duodenitis.  5. Diverticulosis of the colon. No diverticulitis.     MRI/MRCP would be helpful as follow-up.        This report was finalized on 12/26/2022 8:09 PM by Dr. Tong Noriega M.D.     XR Chest 1 View  Narrative: SINGLE VIEW OF THE CHEST     HISTORY: Fever     COMPARISON: None available.     FINDINGS:  Cardiomegaly is present. There is tortuosity of the aorta. No  pneumothorax is seen. There is some blunting the left costophrenic  angle. There are multifocal pulmonary opacities which are noted within  the right upper lobe, concerning for pneumonia. There is some additional  mild consolidation noted at the lung bases bilaterally. This may  represent atelectasis and/or scarring, although additional infiltrate is  not excluded.     Impression: Multifocal pulmonary opacities, concerning for pneumonia.     This report was finalized on 12/26/2022 7:24 PM by Dr. Jackelyn Merritt M.D.     US Gallbladder  US GALLBLADDER-clinical: Fever with right upper quadrant pain     Renal transplant     COMPARISON: None     FINDINGS: Only a small portion of the pancreatic body could be  visualized and is within normal limits however the majority is obscured.  No pancreatic ductal dilatation seen. No peripancreatic fluid  identified.     There is some coarsening of the overall hepatic echotexture in addition  there is some surface irregularity of the liver, the findings are  worrisome for cirrhosis. No intrahepatic ductal dilatation.     The native right kidney is small, 4 cm in length. The transplant is 11  cm in length. Transplant cortical  echotexture is within normal limits,  no obstructive uropathy calculus or mass is demonstrated. No renal RI  calculated.     The gallbladder is satisfactory in appearance. No gallstones or  gallbladder wall thickening nor pericholecystic fluid. CBD is dilated at  12 mm. Pancreatic protocol CT would be the best means for further  evaluation. No free fluid is demonstrated within the upper abdomen.     CONCLUSION: The gallbladder is satisfactory in appearance, no gallstones  seen. CBD diameter however is abnormal at 12 mm. Only small portion of  the pancreatic body could be visualized. Pancreatic protocol CT would be  the best means for further evaluation if possible.     The right renal transplant is satisfactory in appearance. There is  coarsening of the hepatic echotexture with surface irregularity  worrisome for cirrhosis. No free fluid is demonstrated within the right  upper quadrant.     This report was finalized on 12/26/2022 7:05 PM by Dr. Tong Noriega M.D.       Scheduled Medications  amoxicillin-clavulanate, 1 tablet, Oral, Q12H  ascorbic acid, 250 mg, Oral, Daily  dexamethasone, 6 mg, Oral, Daily With Breakfast  enoxaparin, 40 mg, Subcutaneous, Q24H  ferrous sulfate, 325 mg, Oral, BID With Meals  mycophenolate, 250 mg, Oral, QAM  nadolol, 20 mg, Oral, Daily  pantoprazole, 40 mg, Oral, Q AM  remdesivir, 100 mg, Intravenous, Q24H  sodium chloride, 10 mL, Intravenous, Q12H  tacrolimus, 1 mg, Oral, BID    Infusions   Diet  Diet: Cardiac Diets; Low Sodium (2g); Texture: Regular Texture (IDDSI 7); Fluid Consistency: Thin (IDDSI 0)       Assessment/Plan     Active Hospital Problems    Diagnosis  POA   • **Pneumonia due to COVID-19 virus [U07.1, J12.82]  Yes   • Right upper quadrant abdominal pain [R10.11]  Yes   • Acalculous cholecystitis [K81.9]  Yes   • Duodenitis [K29.80]  Yes   • Cirrhosis of liver (HCC) [K74.60]  Yes   • Multifocal pneumonia [J18.9]  Yes   • History of kidney transplant [Z94.0]  Not  Applicable      Resolved Hospital Problems   No resolved problems to display.       67 y.o. female admitted with Pneumonia due to COVID-19 virus.    · COVID-19 pneumonia causing hypoxemia-continue remdesivir for 5 days or until discharge. Continue dexamethasone for 10 days or until discharge. Monitor CRP, liver and renal function  · Acalculous cholecystitis-improving on zosyn. Not a candidate for cholecystectomy with her cirrhosis history. Transitioned to augmentin with plans for 14 total days of therapy  · Duodenitis seen on CT-EGD this admission showed grade 2 esophageal varices, portal hypertensive gastropathy, gastritis, and a normal duodenem. Already on a daily ppi.  · Diarrhea-could be secondary to antibiotics, po phos replacement or cellcept. Since it's persistent and she's been on abx, will check a c. Diff.  · Asymptomatic bacturia-urine with mixed lori on culture  · Cirrhosis with history of varices-continue nadolol   · History of renal transplantation for polycystic kidney disease-continue cellcept and tacrolimus  · Lovenox 40 mg SC daily for DVT prophylaxis.  · Full code.  · Discussed with patient and nursing staff.  · Anticipate discharge home with family in 1-2 days. if c. Diff is negative      Pablo Grove MD  [facility]  12/30/22  14:42 EST    I wore protective equipment throughout this patient encounter including a face mask, gloves and protective eyewear.  Hand hygiene was performed before donning protective equipment and after removal when leaving the room.

## 2022-12-30 NOTE — PROGRESS NOTES
[facility]  Inpatient Progress Note    Reason for Follow Up:  Cirrhosis, abnormal CT    Subjective     Interval History:   egd yesterday with no duodenal abnormality; varices/PHTG noted.  C/o loose stool.  C. difficile pending.  No abdominal pain.  Tolerating diet    Current Facility-Administered Medications:   •  amoxicillin-clavulanate (AUGMENTIN) 875-125 MG per tablet 1 tablet, 1 tablet, Oral, Q12H, [place] MD, 1 tablet at 12/30/22 0956  •  ascorbic acid (VITAMIN C) tablet 250 mg, 250 mg, Oral, Daily, Kristin Thao MD, 250 mg at 12/30/22 0955  •  dexamethasone (DECADRON) tablet 6 mg, 6 mg, Oral, Daily With Breakfast, Kristin Thao MD, 6 mg at 12/30/22 0956  •  Enoxaparin Sodium (LOVENOX) syringe 40 mg, 40 mg, Subcutaneous, Q24H, Kristin Thao MD, 40 mg at 12/29/22 1735  •  ferrous sulfate tablet 325 mg, 325 mg, Oral, BID With Meals, Kristin Thao MD, 325 mg at 12/30/22 1003  •  HYDROmorphone (DILAUDID) injection 0.5 mg, 0.5 mg, Intravenous, Q3H PRN, Kristin Thao MD, 0.5 mg at 12/29/22 1736  •  melatonin tablet 5 mg, 5 mg, Oral, Nightly PRN, Kristin Thao MD  •  mycophenolate (CELLCEPT) capsule 250 mg, 250 mg, Oral, QAM, Kristin Thao MD, 250 mg at 12/30/22 0642  •  nadolol (CORGARD) tablet 20 mg, 20 mg, Oral, Daily, Kristin Thao MD, 20 mg at 12/30/22 0955  •  ondansetron (ZOFRAN) tablet 4 mg, 4 mg, Oral, Q6H PRN, 4 mg at 12/27/22 2009 **OR** ondansetron (ZOFRAN) injection 4 mg, 4 mg, Intravenous, Q6H PRN, Kristin Thao MD  •  pantoprazole (PROTONIX) EC tablet 40 mg, 40 mg, Oral, Q AM, Kristin Thao MD, 40 mg at 12/30/22 0642  •  [COMPLETED] remdesivir 200 mg in sodium chloride 0.9 % 290 mL IVPB (powder vial), 200 mg, Intravenous, Q24H, 200 mg at 12/27/22 1710 **FOLLOWED BY** remdesivir 100 mg in sodium chloride 0.9 % 250 mL IVPB (powder vial), 100 mg, Intravenous, Q24H, Kristin Thao MD, 100 mg at 12/29/22 1732  •   [COMPLETED] Insert Peripheral IV, , , Once **AND** sodium chloride 0.9 % flush 10 mL, 10 mL, Intravenous, PRN, Kristin Thao MD  •  sodium chloride 0.9 % flush 10 mL, 10 mL, Intravenous, Q12H, Kristin Thao MD, 10 mL at 12/30/22 0955  •  sodium chloride 0.9 % flush 10 mL, 10 mL, Intravenous, PRN, Kristin Thao MD  •  sodium chloride 0.9 % infusion 40 mL, 40 mL, Intravenous, PRN, Kristin Thao MD  •  tacrolimus (PROGRAF) capsule 1 mg, 1 mg, Oral, BID, Kristin Thao MD, 1 mg at 12/30/22 0956  Review of Systems:    Positive for loose stool, negative for abdominal pain, negative for fever    Objective     Vital Signs  Temp:  [97.4 °F (36.3 °C)-98 °F (36.7 °C)] 98 °F (36.7 °C)  Heart Rate:  [60-67] 60  Resp:  [16-18] 18  BP: (114-134)/(75-80) 134/79  Body mass index is 27.28 kg/m².  No intake or output data in the 24 hours ending 12/30/22 1621  No intake/output data recorded.     Physical Exam:   General: patient awake, alert and cooperative   Eyes: Normal lids and lashes, no scleral icterus   Neck: supple, normal ROM   Skin: warm and dry, not jaundiced   Pulm:  regular and unlabored   Abdomen: soft, nontender, mildly distended   Psychiatric: Normal mood and behavior; memory intact     Results Review:     I reviewed the patient's new clinical results.    Results from last 7 days   Lab Units 12/30/22  0734 12/29/22  1122 12/28/22  0917   WBC 10*3/mm3 3.43 3.48 2.00*   HEMOGLOBIN g/dL 10.2* 10.4* 9.8*   HEMATOCRIT % 33.1* 31.8* 30.6*   PLATELETS 10*3/mm3 172 153 132*     Results from last 7 days   Lab Units 12/30/22  0734 12/29/22  1122 12/28/22  0917   SODIUM mmol/L 138 137 138   POTASSIUM mmol/L 4.2 4.0 4.4   CHLORIDE mmol/L 107 106 105   CO2 mmol/L 22.7 25.7 25.0   BUN mg/dL 23 22 19   CREATININE mg/dL 0.86 0.96 0.92   CALCIUM mg/dL 9.1 9.4 9.4   BILIRUBIN mg/dL 0.3 0.4 0.3   ALK PHOS U/L 71 73 78   ALT (SGPT) U/L 22 22 19   AST (SGOT) U/L 30 27 24   GLUCOSE mg/dL 109* 96 112*     Results from  last 7 days   Lab Units 12/29/22  1122   INR  1.19*     Lab Results   Lab Value Date/Time    LIPASE 36 12/26/2022 1755    LIPASE 32 07/28/2021 1137       Radiology:  CT Abdomen Pelvis With Contrast   Final Result      XR Chest 1 View   Final Result      US Gallbladder   Final Result          Assessment & Plan     Patient Active Problem List   Diagnosis   • Multifocal pneumonia   • Right upper quadrant abdominal pain   • Benign hypertensive kidney disease with chronic kidney disease   • Benign hypertension   • History of kidney transplant   • Pneumonia due to COVID-19 virus   • Acalculous cholecystitis   • Duodenitis   • Cirrhosis of liver (HCC)       Assessment:  1.  Abnormal CT imaging of the abdomen with duodenitis, thickened jejunum     2.  Cirrhosis with portal hypertension: Chronic issue, thought to be related to her polycystic disease     3.  Acute right upper quadrant pain: Concerns for acalculus cholecystitis     4.  History of polycystic kidney disease status postrenal transplant on immunosuppression     5.  Multifocal pneumonia: On antibiotics      Plan:  · CT findings re duodenum not noted on egd- nml small bowel  · Mild gastritis noted - bx d/w pt.  Continue daily PPI  · Continue nadolol for esophageal variceal bleeding prophlaxis  · Stool studies ordered for diarrhea per primary team  · Will need close outpt f/u with Dr Hernandez for cirrhosis upon d/c-we will see as needed    I discussed the patients findings and my recommendations with patient and family.    Kristin Thao MD

## 2022-12-30 NOTE — PROGRESS NOTES
[facility] Progress Note      Patient Name: Jacey Valdez  : 1955  MRN: [id number]  Primary Care Physician:  Morales Alan MD  Date of admission: 2022    Subjective     Interval History:     Patient overnight no event    Accompanied by  at bedside    Patient with improved right upper quadrant discomfort  Tolerating oral intake  Noticing some loose bowel movements C. difficile has been requested    Urinary spontaneously    No fevers or chills    Review of Systems:   As noted above    Objective     Vitals:   Temp:  [97.4 °F (36.3 °C)-98 °F (36.7 °C)] 98 °F (36.7 °C)  Heart Rate:  [60-73] 60  Resp:  [16-18] 18  BP: (114-137)/(75-89) 134/79  Flow (L/min):  [2] 2  No intake or output data in the 24 hours ending 22 1452    Physical Exam:      Constitutional: Awake, alert, no acute distress.  HEENT: Sclera anicteric, no conjunctival injection  Neck: Supple, no thyromegaly, no lymphadenopathy, trachea at midline, no JVD  Respiratory: Clear to auscultation bilaterally, nonlabored respiration  Cardiovascular: RRR, no murmurs,   Gastrointestinal: Positive bowel sounds, abdomen is soft, nontender and nondistended.  Palpable transplanted kidney  : No palpable bladder  Musculoskeletal: No edema, no clubbing or cyanosis.  Right hand clubbing with previous ipsilateral AV fistula  Psychiatric: Appropriate affect, cooperative  Neurologic: Oriented x3, moving all extremities, normal speech and mental status  Skin: Warm and dry        Scheduled Meds:     amoxicillin-clavulanate, 1 tablet, Oral, Q12H  ascorbic acid, 250 mg, Oral, Daily  dexamethasone, 6 mg, Oral, Daily With Breakfast  enoxaparin, 40 mg, Subcutaneous, Q24H  ferrous sulfate, 325 mg, Oral, BID With Meals  mycophenolate, 250 mg, Oral, QAM  nadolol, 20 mg, Oral, Daily  pantoprazole, 40 mg, Oral, Q AM  remdesivir, 100 mg, Intravenous, Q24H  sodium chloride, 10 mL, Intravenous, Q12H  tacrolimus, 1 mg, Oral,  BID      IV Meds:        Results Reviewed:   I have personally reviewed the results from the time of this admission to 12/30/2022 14:52 EST     Results from last 7 days   Lab Units 12/30/22  0734 12/29/22  1122 12/28/22  0917   SODIUM mmol/L 138 137 138   POTASSIUM mmol/L 4.2 4.0 4.4   CHLORIDE mmol/L 107 106 105   CO2 mmol/L 22.7 25.7 25.0   BUN mg/dL 23 22 19   CREATININE mg/dL 0.86 0.96 0.92   CALCIUM mg/dL 9.1 9.4 9.4   BILIRUBIN mg/dL 0.3 0.4 0.3   ALK PHOS U/L 71 73 78   ALT (SGPT) U/L 22 22 19   AST (SGOT) U/L 30 27 24   GLUCOSE mg/dL 109* 96 112*       Estimated Creatinine Clearance: 76 mL/min (by C-G formula based on SCr of 0.86 mg/dL).    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  Patient is currently on CellCept 250 mg daily, tacrolimus 1 mg twice a day.we will continue current therapy and will follow closely renal function ..  Urinalysis nitrate positive with pyuria and bacteriuria  -H/o of cirrhosis of liver and portal hypertension by CT and MRI.  On nadolol   -COVID 19 pneumonia . Currently on remdesivir, dexamethasone , inhalers , supplemental oxygen and respiratory therapy . Isolation . As per primary team and pulmonology .  -Acalculus cholelithiasis patient is not a surgical candidate as per general surgery on medical management on piperacillin  tazobactam changed to Augmentin that we will need to complete 14 days of treatment  -Acute on chronic normocytic anemia . Started on ferrous sulfate and vitamin C ,   -Hypophosphatemia on phosphorus oral  Replacement, will follow trend       PLAN:    -Continue current immunosuppressant.   -Currently her renal function ,electrolytes, and volume status remained stable  -Patient to complete remdesivir treatment in 24 hours   -Patient is stable to with discharge once he completed her COVID treatment  -Follow renal function closely  -Avoid nephrotoxins  -Adjust medications to GFR       Thank you for involving us in the care of Jacey Valdez.  Please feel free to call with any questions.    Akin Tee MD  12/30/22  14:52 [facility]    [facility]  [phone]    Please note that portions of this note were completed with a voice recognition program.

## 2022-12-30 NOTE — PLAN OF CARE
Problem: Adult Inpatient Plan of Care  Goal: Plan of Care Review  Outcome: Ongoing, Progressing  Flowsheets (Taken 12/30/2022 0500)  Progress: improving  Plan of Care Reviewed With: patient  Outcome Evaluation:   VSS   on RA, 2L @ night   SR on monitor   no c/o soa, CP, or N/V   no acute distress noted   will cont to monitor   Goal Outcome Evaluation:  Plan of Care Reviewed With: patient        Progress: improving  Outcome Evaluation: VSS; on RA, 2L @ night; SR on monitor; no c/o soa, CP, or N/V; no acute distress noted; will cont to monitor

## 2022-12-31 ENCOUNTER — READMISSION MANAGEMENT (OUTPATIENT)
Dept: CALL CENTER | Facility: HOSPITAL | Age: 67
End: 2022-12-31

## 2022-12-31 VITALS
RESPIRATION RATE: 18 BRPM | WEIGHT: 195.6 LBS | DIASTOLIC BLOOD PRESSURE: 98 MMHG | HEIGHT: 71 IN | SYSTOLIC BLOOD PRESSURE: 138 MMHG | BODY MASS INDEX: 27.38 KG/M2 | HEART RATE: 65 BPM | OXYGEN SATURATION: 94 % | TEMPERATURE: 97.4 F

## 2022-12-31 PROBLEM — U07.1 PNEUMONIA DUE TO COVID-19 VIRUS: Status: RESOLVED | Noted: 2022-12-27 | Resolved: 2022-12-31

## 2022-12-31 PROBLEM — R10.11 RIGHT UPPER QUADRANT ABDOMINAL PAIN: Status: RESOLVED | Noted: 2022-12-27 | Resolved: 2022-12-31

## 2022-12-31 PROBLEM — D89.832 CYTOKINE RELEASE SYNDROME, GRADE 2: Status: RESOLVED | Noted: 2022-12-31 | Resolved: 2022-12-31

## 2022-12-31 PROBLEM — K29.80 DUODENITIS: Status: RESOLVED | Noted: 2022-12-27 | Resolved: 2022-12-31

## 2022-12-31 PROBLEM — K81.9 ACALCULOUS CHOLECYSTITIS: Status: RESOLVED | Noted: 2022-12-27 | Resolved: 2022-12-31

## 2022-12-31 PROBLEM — J18.9 MULTIFOCAL PNEUMONIA: Status: RESOLVED | Noted: 2022-12-26 | Resolved: 2022-12-31

## 2022-12-31 PROBLEM — D89.832 CYTOKINE RELEASE SYNDROME, GRADE 2: Status: ACTIVE | Noted: 2022-12-31

## 2022-12-31 PROBLEM — J12.82 PNEUMONIA DUE TO COVID-19 VIRUS: Status: RESOLVED | Noted: 2022-12-27 | Resolved: 2022-12-31

## 2022-12-31 LAB
ALBUMIN SERPL-MCNC: 2.4 G/DL (ref 3.5–5.2)
ALP SERPL-CCNC: 70 U/L (ref 39–117)
ALT SERPL W P-5'-P-CCNC: 19 U/L (ref 1–33)
ANION GAP SERPL CALCULATED.3IONS-SCNC: 6.4 MMOL/L (ref 5–15)
AST SERPL-CCNC: 27 U/L (ref 1–32)
BACTERIA SPEC AEROBE CULT: NORMAL
BACTERIA SPEC AEROBE CULT: NORMAL
BILIRUB CONJ SERPL-MCNC: <0.2 MG/DL (ref 0–0.3)
BILIRUB INDIRECT SERPL-MCNC: ABNORMAL MG/DL
BILIRUB SERPL-MCNC: 0.4 MG/DL (ref 0–1.2)
BUN SERPL-MCNC: 21 MG/DL (ref 8–23)
BUN/CREAT SERPL: 24.4 (ref 7–25)
CALCIUM SPEC-SCNC: 9.2 MG/DL (ref 8.6–10.5)
CHLORIDE SERPL-SCNC: 110 MMOL/L (ref 98–107)
CO2 SERPL-SCNC: 22.6 MMOL/L (ref 22–29)
CREAT SERPL-MCNC: 0.86 MG/DL (ref 0.57–1)
CRP SERPL-MCNC: 1.59 MG/DL (ref 0–0.5)
DEPRECATED RDW RBC AUTO: 48.6 FL (ref 37–54)
EGFRCR SERPLBLD CKD-EPI 2021: 73.2 ML/MIN/1.73
ERYTHROCYTE [DISTWIDTH] IN BLOOD BY AUTOMATED COUNT: 15.8 % (ref 12.3–15.4)
GLUCOSE SERPL-MCNC: 117 MG/DL (ref 65–99)
HCT VFR BLD AUTO: 37 % (ref 34–46.6)
HGB BLD-MCNC: 11.6 G/DL (ref 12–15.9)
MCH RBC QN AUTO: 27.2 PG (ref 26.6–33)
MCHC RBC AUTO-ENTMCNC: 31.4 G/DL (ref 31.5–35.7)
MCV RBC AUTO: 86.9 FL (ref 79–97)
PLATELET # BLD AUTO: 159 10*3/MM3 (ref 140–450)
PMV BLD AUTO: 10.6 FL (ref 6–12)
POTASSIUM SERPL-SCNC: 3.9 MMOL/L (ref 3.5–5.2)
PROT SERPL-MCNC: 6.5 G/DL (ref 6–8.5)
RBC # BLD AUTO: 4.26 10*6/MM3 (ref 3.77–5.28)
SODIUM SERPL-SCNC: 139 MMOL/L (ref 136–145)
WBC NRBC COR # BLD: 4.97 10*3/MM3 (ref 3.4–10.8)

## 2022-12-31 PROCEDURE — 80076 HEPATIC FUNCTION PANEL: CPT | Performed by: INTERNAL MEDICINE

## 2022-12-31 PROCEDURE — 63710000001 TACROLIMUS PER 1 MG: Performed by: INTERNAL MEDICINE

## 2022-12-31 PROCEDURE — 85027 COMPLETE CBC AUTOMATED: CPT | Performed by: STUDENT IN AN ORGANIZED HEALTH CARE EDUCATION/TRAINING PROGRAM

## 2022-12-31 PROCEDURE — 63710000001 DEXAMETHASONE PER 0.25 MG: Performed by: INTERNAL MEDICINE

## 2022-12-31 PROCEDURE — 86140 C-REACTIVE PROTEIN: CPT | Performed by: INTERNAL MEDICINE

## 2022-12-31 PROCEDURE — 80048 BASIC METABOLIC PNL TOTAL CA: CPT | Performed by: STUDENT IN AN ORGANIZED HEALTH CARE EDUCATION/TRAINING PROGRAM

## 2022-12-31 PROCEDURE — 63710000001 MYCOPHENOLATE MOFETIL PER 250 MG: Performed by: INTERNAL MEDICINE

## 2022-12-31 RX ORDER — MYCOPHENOLIC ACID 180 MG/1
TABLET, DELAYED RELEASE ORAL EVERY 12 HOURS SCHEDULED
Status: CANCELLED | OUTPATIENT
Start: 2022-12-31

## 2022-12-31 RX ORDER — AMOXICILLIN AND CLAVULANATE POTASSIUM 875; 125 MG/1; MG/1
1 TABLET, FILM COATED ORAL EVERY 12 HOURS SCHEDULED
Qty: 19 TABLET | Refills: 0 | Status: SHIPPED | OUTPATIENT
Start: 2022-12-31 | End: 2022-12-31 | Stop reason: SDUPTHER

## 2022-12-31 RX ORDER — PANTOPRAZOLE SODIUM 40 MG/1
40 TABLET, DELAYED RELEASE ORAL
Qty: 30 TABLET | Refills: 0 | Status: SHIPPED | OUTPATIENT
Start: 2023-01-01 | End: 2022-12-31 | Stop reason: SDUPTHER

## 2022-12-31 RX ORDER — PANTOPRAZOLE SODIUM 40 MG/1
40 TABLET, DELAYED RELEASE ORAL
Qty: 30 TABLET | Refills: 0 | Status: SHIPPED | OUTPATIENT
Start: 2023-01-01 | End: 2023-01-31

## 2022-12-31 RX ORDER — FERROUS SULFATE 325(65) MG
325 TABLET ORAL
Qty: 30 TABLET | Refills: 0 | Status: SHIPPED | OUTPATIENT
Start: 2022-12-31 | End: 2022-12-31 | Stop reason: SDUPTHER

## 2022-12-31 RX ORDER — FERROUS SULFATE 325(65) MG
325 TABLET ORAL
Qty: 30 TABLET | Refills: 0 | Status: SHIPPED | OUTPATIENT
Start: 2022-12-31 | End: 2023-01-30

## 2022-12-31 RX ORDER — AMOXICILLIN AND CLAVULANATE POTASSIUM 875; 125 MG/1; MG/1
1 TABLET, FILM COATED ORAL EVERY 12 HOURS SCHEDULED
Qty: 19 TABLET | Refills: 0 | Status: SHIPPED | OUTPATIENT
Start: 2022-12-31 | End: 2023-01-10

## 2022-12-31 RX ADMIN — AMOXICILLIN AND CLAVULANATE POTASSIUM 1 TABLET: 875; 125 TABLET, FILM COATED ORAL at 10:14

## 2022-12-31 RX ADMIN — TACROLIMUS 1 MG: 1 CAPSULE ORAL at 10:14

## 2022-12-31 RX ADMIN — FERROUS SULFATE TAB 325 MG (65 MG ELEMENTAL FE) 325 MG: 325 (65 FE) TAB at 10:14

## 2022-12-31 RX ADMIN — MYCOPHENOLATE MOFETIL 250 MG: 250 CAPSULE ORAL at 07:01

## 2022-12-31 RX ADMIN — OXYCODONE HYDROCHLORIDE AND ACETAMINOPHEN 250 MG: 500 TABLET ORAL at 10:14

## 2022-12-31 RX ADMIN — Medication 10 ML: at 10:17

## 2022-12-31 RX ADMIN — DEXAMETHASONE 6 MG: 4 TABLET ORAL at 10:14

## 2022-12-31 RX ADMIN — PANTOPRAZOLE SODIUM 40 MG: 40 TABLET, DELAYED RELEASE ORAL at 07:01

## 2022-12-31 RX ADMIN — NADOLOL 20 MG: 20 TABLET ORAL at 10:14

## 2022-12-31 NOTE — PROGRESS NOTES
[facility] Progress Note      Patient Name: Jacey Valdez  : 1955  MRN: [id number]  Primary Care Physician:  Morales Alan MD  Date of admission: 2022    Subjective     Interval History:     Follow-up for end-stage renal disease.  Charted vitals stable overnight.   at bedside  Tolerating oral intake  Plans for discharge later today.    Review of Systems:   As noted above    Objective     Vitals:   Temp:  [97.3 °F (36.3 °C)-98 °F (36.7 °C)] 97.4 °F (36.3 °C)  Heart Rate:  [60-65] 65  Resp:  [18] 18  BP: (131-138)/(79-98) 138/98  Flow (L/min):  [2] 2  No intake or output data in the 24 hours ending 22 1056    Physical Exam:      Constitutional: Awake, alert, no acute distress.  HEENT: Sclera anicteric, no conjunctival injection  Neck: Supple, no thyromegaly, no lymphadenopathy, trachea at midline, no JVD  Respiratory: Clear to auscultation bilaterally, nonlabored respiration  Cardiovascular: RRR, no murmurs,   Gastrointestinal: abdomen is soft, nontender and nondistended.  Palpable transplanted kidney  : No palpable bladder  Musculoskeletal: No edema, no clubbing or cyanosis.  Right hand clubbing with previous ipsilateral AV fistula  Psychiatric: Appropriate affect, cooperative  Neurologic: Oriented x3, moving all extremities, normal speech and mental status  Skin: Warm and dry        Scheduled Meds:     amoxicillin-clavulanate, 1 tablet, Oral, Q12H  ascorbic acid, 250 mg, Oral, Daily  dexamethasone, 6 mg, Oral, Daily With Breakfast  enoxaparin, 40 mg, Subcutaneous, Q24H  ferrous sulfate, 325 mg, Oral, BID With Meals  mycophenolate, 250 mg, Oral, QAM  nadolol, 20 mg, Oral, Daily  pantoprazole, 40 mg, Oral, Q AM  remdesivir, 100 mg, Intravenous, Q24H  sodium chloride, 10 mL, Intravenous, Q12H  tacrolimus, 1 mg, Oral, BID      IV Meds:        Results Reviewed:   I have personally reviewed the results from the time of this admission to 2022 10:56  EST     Results from last 7 days   Lab Units 12/31/22  0914 12/30/22  0734 12/29/22  1122   SODIUM mmol/L 139 138 137   POTASSIUM mmol/L 3.9 4.2 4.0   CHLORIDE mmol/L 110* 107 106   CO2 mmol/L 22.6 22.7 25.7   BUN mg/dL 21 23 22   CREATININE mg/dL 0.86 0.86 0.96   CALCIUM mg/dL 9.2 9.1 9.4   BILIRUBIN mg/dL 0.4 0.3 0.4   ALK PHOS U/L 70 71 73   ALT (SGPT) U/L 19 22 22   AST (SGOT) U/L 27 30 27   GLUCOSE mg/dL 117* 109* 96       Estimated Creatinine Clearance: 76 mL/min (by C-G formula based on SCr of 0.86 mg/dL).    Results from last 7 days   Lab Units 12/30/22  0734 12/29/22  1122 12/28/22  0917 12/27/22  0945   MAGNESIUM mg/dL 1.7  --  1.7 1.8   PHOSPHORUS mg/dL 2.8 2.5 3.2 2.3*             Results from last 7 days   Lab Units 12/31/22  0914 12/30/22  0734 12/29/22  1122 12/28/22  0917 12/27/22  0945   WBC 10*3/mm3 4.97 3.43 3.48 2.00* 3.78   HEMOGLOBIN g/dL 11.6* 10.2* 10.4* 9.8* 9.7*   PLATELETS 10*3/mm3 159 172 153 132* 105*       Results from last 7 days   Lab Units 12/29/22  1122   INR  1.19*       Assessment / Plan     ASSESSMENT:    - ESRD due to Polycystic Kidney Disease. 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  Patient is currently on CellCept 250 mg daily, tacrolimus 1 mg twice a day.we will continue current therapy and will follow closely renal function ..  Urinalysis nitrate positive with pyuria and bacteriuria  -H/o of cirrhosis of liver and portal hypertension by CT and MRI.  On nadolol   -COVID 19 pneumonia . Currently on remdesivir, dexamethasone , inhalers , supplemental oxygen and respiratory therapy . Isolation . As per primary team and pulmonology.  Last dose of remdesivir today.  -Acalculus cholelithiasis patient is not a surgical candidate as per general surgery on medical management on piperacillin tazobactam changed to Augmentin that we will need to complete 14 days of treatment  -Acute on chronic  normocytic anemia . Started on ferrous sulfate and vitamin C ,   -Hypophosphatemia on phosphorus oral  Replacement, will follow trend       PLAN:    -Continue current immunosuppressant.   -Encourage oral hydration  -Currently her renal function ,electrolytes, and volume status remained stable  -Avoid nephrotoxins  -Adjust medications to GFR  -Can be discharged from renal standpoint, will arrange for transplant outpatient follow-up in couple of weeks after discharge    Thank you for involving us in the care of Jacey Valdez.  Please feel free to call with any questions.    Augusto Kapoor MD  12/31/22  10:56 [facility]    [facility]  [phone]    Please note that portions of this note were completed with a voice recognition program.

## 2022-12-31 NOTE — OUTREACH NOTE
Prep Survey    Flowsheet Row Responses   [facility] facility patient discharged from? [facility]   Is LACE score < 7 ? No   Eligibility Readm Mgmt   Discharge diagnosis Pneumonia due to COVID Cirrhosis of liver Acalculous cholecystitis    Does the patient have one of the following disease processes/diagnoses(primary or secondary)? Pneumonia   Does the patient have Home health ordered? No   Is there a DME ordered? No   Prep survey completed? Yes          JASON HUGO - Registered Nurse

## 2022-12-31 NOTE — DISCHARGE SUMMARY
Patient Name: Jacey Valdez  : 1955  MRN: [id number]    Date of Admission: 2022  Date of Discharge:  2022  Primary Care Physician: Morales Alan MD      Chief Complaint:   Abdominal Pain      Discharge Diagnoses     Active Hospital Problems    Diagnosis  POA   • Cirrhosis of liver (HCC) [K74.60]  Yes   • History of kidney transplant [Z94.0]  Not Applicable      Resolved Hospital Problems    Diagnosis Date Resolved POA   • **Pneumonia due to COVID-19 virus [U07.1, J12.82] 2022 Yes   • Cytokine release syndrome, grade 2 [D89.832] 2022 No   • Right upper quadrant abdominal pain [R10.11] 2022 Yes   • Acalculous cholecystitis [K81.9] 2022 Yes   • Duodenitis [K29.80] 2022 Yes   • Multifocal pneumonia [J18.9] 2022 Yes        Hospital Course     Ms. Valdez is a 67 y.o. female with a history of polycystic kidney disease s/p renal transplantation on cellcept and tacrolimus, cirrhosis with esophageal varices thought to be related to her history of polycystic kidney disease who presented to [facility] initially complaining of abdominal pain, cough, shortness of breath.  Please see the admitting history and physical for further details.  She was found to have acute respiratory failure with hypoxia from covid-19 pneumonia and acalculous cholecystitis as well as duodenitis seen on CT and was admitted to the hospital for further evaluation and treatment.      She was seen in consultation by nephrology, GI, general surgery. She was started on zosyn, dexamethasone, and remdesivir. General surgery felt that with her varices/cirrhosis she was not a good candidate for cholecystectomy and instead if not improving on abx she should have a percutaneous cholecystostomy tube. Thankfully, she improved quickly and was transitioned to oral augmentin for a total of 14 days of therapy. With regard to her covid, she improved and after a couple of days was on room air.  Gastroenterology did take her for EGD to evaluate the duodenitis seen on the admission CT scan. This showed grade 2 esophgeal varices, portal hypertensive gastropathy, gastritis, and a normal duodenem. She was started on a PPI. She's doing well today and has no new complains and so will be discharged home. She was told that she will need to isolate for 20 days from admission given her immunocompromised state.     Day of Discharge     Subjective:  No events overnight. She's feeling well and has no complaints. Diarrhea is resolving. C. Diff testing was negative.    Physical Exam:  Temp:  [97.3 °F (36.3 °C)-98 °F (36.7 °C)] 97.4 °F (36.3 °C)  Heart Rate:  [60-65] 65  Resp:  [18] 18  BP: (131-138)/(79-98) 138/98  Body mass index is 27.28 kg/m².  Physical Exam  Constitutional:       General: She is not in acute distress.     Appearance: She is not toxic-appearing.   Cardiovascular:      Rate and Rhythm: Normal rate and regular rhythm.      Heart sounds: Normal heart sounds.   Pulmonary:      Effort: No respiratory distress.      Breath sounds: Normal breath sounds. No wheezing, rhonchi or rales.   Abdominal:      General: Bowel sounds are normal. There is no distension.      Palpations: Abdomen is soft.      Tenderness: There is no abdominal tenderness.   Musculoskeletal:         General: No tenderness.      Right lower leg: No edema.      Left lower leg: No edema.   Neurological:      Mental Status: She is alert.   Psychiatric:         Mood and Affect: Mood normal.         Behavior: Behavior normal.         Consultants     Consult Orders (all) (From admission, onward)     Start     Ordered    12/28/22 1519  Inpatient Consult to Advance Care Planning  Once        Provider:  (Not yet assigned)    12/28/22 1518    12/27/22 1518  Inpatient Nephrology Consult  Once        Specialty:  Nephrology  Provider:  Cornelio Porter MD    12/27/22 1518    12/27/22 1441  Inpatient Infection Control Nurse Consult  Once         Provider:  (Not yet assigned)    12/27/22 1441    12/27/22 0318  Inpatient Infection Control Nurse Consult  Once        Provider:  (Not yet assigned)    12/27/22 0317    12/27/22 0213  Inpatient Gastroenterology Consult  Once        Specialty:  Gastroenterology  Provider:  Kristin Thao MD    12/27/22 0212 12/26/22 2148  Inpatient General Surgery Consult  Once        Specialty:  General Surgery  Provider:  (Not yet assigned)    12/26/22 2149 12/1955  [facility] (on-call MD unless specified) Details  Once        Specialty:  Hospitalist  Provider:  (Not yet assigned)    12/1955              Procedures     Imaging Results (All)     Procedure Component Value Units Date/Time    CT Abdomen Pelvis With Contrast [[id number]] Collected: 12/26/22 1939     Updated: 12/26/22 2012    Narrative:      CT ABDOMEN PELVIS W CONTRAST-     Radiation dose reduction techniques were utilized, including automated  exposure control and exposure modulation based on body size.           Clinical: Right upper quadrant pain, febrile     Correlation with gallbladder ultrasound earlier at 1831 hours.     FINDINGS: There is mild thickening and irregularity of the gallbladder  wall which was not appreciated on the ultrasound examination.  Potentially calculus cholecystitis. CBD is dilated, 11 mm in diameter.  This smoothly tapers to the ampulla. No stone demonstrated. Possible  small diverticulum at this location measuring 13 mm, partially filled  with gas.     The liver is grossly enlarged and heterogenous in attenuation with  superficial irregularity and a nodular appearance consistent with  cirrhosis. Areas of diminished attenuation likely fatty infiltration.  Underlying diffuse hepatocellular disease or neoplasm possible. The  spleen is enlarged and there is extensive collateral vessel formation  throughout the upper abdomen with recannulization of the umbilical vein  consistent with portal hypertension. Within the right lobe  of the liver  there is a 14 mm area of nodularity having the appearance of a cyst. The  pancreas is satisfactory in appearance.     Both adrenal glands are normal. The native kidneys are small and there  is nephrocalcinosis. There is 2 cm right renal cyst. Right pelvic renal  transplant demonstrates a normal pattern of enhancement, no obstructive  uropathy mass or calculus. There is wall thickening demonstrated along  the right lateral border of the urinary bladder. There is some serosal  spiculation at this location. Cystitis not excluded. Tiny gas bubble is  noted within, has a been recent catheterization?     There is diverticulosis of the colon. No indication of diverticulitis.  Within the midabdomen there is a thick-walled segment of jejunum  measuring approximately 12 cm in length. This consistent with enteritis.  The stomach is collapsed. Duodenal bulb is typical in appearance. There  is some fold thickening and irregularity of the second third and fourth  portions of the duodenum suggestive of duodenitis.     No free air or free intraperitoneal fluid. The uterus is small in size,  appropriate for age, no ovarian abnormality. There are Tarlov cysts.     Conclusion:  1. The liver is grossly abnormal, it is enlarged with surface  irregularity and heterogenous. The appearance is consistent with  cirrhosis with likely fatty infiltration. Underlying hepatitis or  hepatocellular carcinoma not excluded. There is splenomegaly and  extensive collateral vessel formation consistent with portal  hypertension.  2. There is gallbladder wall thickening and irregularity, possible  acalculus cholecystitis. CBD is dilated at 11 mm with smooth tapering to  the ampulla. No definite stone identified. At this location there  appears to be a partially gas-filled diverticulum.  3. Renal transplant is satisfactory in appearance. There is asymmetric  wall thickening of the right bladder with serosal irregularity/edema  worrisome for  cystitis.  4. Long segment of jejunum which demonstrates wall thickening. In  addition there is fold irregularity of the duodenum, findings suggest  enteritis/duodenitis.  5. Diverticulosis of the colon. No diverticulitis.     MRI/MRCP would be helpful as follow-up.        This report was finalized on 12/26/2022 8:09 PM by Dr. Tong Noriega M.D.       XR Chest 1 View [[id number]] Collected: 12/26/22 1922     Updated: 12/26/22 1930    Narrative:      SINGLE VIEW OF THE CHEST     HISTORY: Fever     COMPARISON: None available.     FINDINGS:  Cardiomegaly is present. There is tortuosity of the aorta. No  pneumothorax is seen. There is some blunting the left costophrenic  angle. There are multifocal pulmonary opacities which are noted within  the right upper lobe, concerning for pneumonia. There is some additional  mild consolidation noted at the lung bases bilaterally. This may  represent atelectasis and/or scarring, although additional infiltrate is  not excluded.       Impression:      Multifocal pulmonary opacities, concerning for pneumonia.     This report was finalized on 12/26/2022 7:24 PM by Dr. Jackelyn Merritt M.D.       US Gallbladder [[id number]] Collected: 12/26/22 1857     Updated: 12/26/22 1908    Narrative:      US GALLBLADDER-clinical: Fever with right upper quadrant pain     Renal transplant     COMPARISON: None     FINDINGS: Only a small portion of the pancreatic body could be  visualized and is within normal limits however the majority is obscured.  No pancreatic ductal dilatation seen. No peripancreatic fluid  identified.     There is some coarsening of the overall hepatic echotexture in addition  there is some surface irregularity of the liver, the findings are  worrisome for cirrhosis. No intrahepatic ductal dilatation.     The native right kidney is small, 4 cm in length. The transplant is 11  cm in length. Transplant cortical echotexture is within normal limits,  no obstructive uropathy  calculus or mass is demonstrated. No renal RI  calculated.     The gallbladder is satisfactory in appearance. No gallstones or  gallbladder wall thickening nor pericholecystic fluid. CBD is dilated at  12 mm. Pancreatic protocol CT would be the best means for further  evaluation. No free fluid is demonstrated within the upper abdomen.     CONCLUSION: The gallbladder is satisfactory in appearance, no gallstones  seen. CBD diameter however is abnormal at 12 mm. Only small portion of  the pancreatic body could be visualized. Pancreatic protocol CT would be  the best means for further evaluation if possible.     The right renal transplant is satisfactory in appearance. There is  coarsening of the hepatic echotexture with surface irregularity  worrisome for cirrhosis. No free fluid is demonstrated within the right  upper quadrant.     This report was finalized on 12/26/2022 7:05 PM by Dr. Tong Noriega M.D.             Pertinent Labs     Results from last 7 days   Lab Units 12/31/22  0914 12/30/22  0734 12/29/22  1122 12/28/22  0917   WBC 10*3/mm3 4.97 3.43 3.48 2.00*   HEMOGLOBIN g/dL 11.6* 10.2* 10.4* 9.8*   PLATELETS 10*3/mm3 159 172 153 132*     Results from last 7 days   Lab Units 12/30/22  0734 12/29/22  1122 12/28/22  0917 12/27/22  1724 12/27/22  0945   SODIUM mmol/L 138 137 138  --  138   POTASSIUM mmol/L 4.2 4.0 4.4  --  4.0   CHLORIDE mmol/L 107 106 105  --  107   CO2 mmol/L 22.7 25.7 25.0  --  24.6   BUN mg/dL 23 22 19  --  17   CREATININE mg/dL 0.86 0.96 0.92 0.90 0.85   GLUCOSE mg/dL 109* 96 112*  --  124*   Estimated Creatinine Clearance: 76 mL/min (by C-G formula based on SCr of 0.86 mg/dL).  Results from last 7 days   Lab Units 12/30/22  0734 12/29/22  1122 12/28/22  0917 12/27/22  1724   ALBUMIN g/dL 2.6* 2.7* 2.5* 2.5*   BILIRUBIN mg/dL 0.3 0.4 0.3 0.4   ALK PHOS U/L 71 73 78 76   AST (SGOT) U/L 30 27 24 26   ALT (SGPT) U/L 22 22 19 24     Results from last 7 days   Lab Units 12/30/22  0707  12/29/22  1122 12/28/22  0917 12/27/22  1724 12/27/22  0945   CALCIUM mg/dL 9.1 9.4 9.4  --  8.9   ALBUMIN g/dL 2.6* 2.7* 2.5* 2.5*  --    MAGNESIUM mg/dL 1.7  --  1.7  --  1.8   PHOSPHORUS mg/dL 2.8 2.5 3.2  --  2.3*     Results from last 7 days   Lab Units 12/26/22  1755   LIPASE U/L 36             Invalid input(s): LDLCALC  Results from last 7 days   Lab Units 12/26/22 2239 12/26/22  1755   BLOODCX   --  No growth at 4 days  No growth at 4 days   URINECX  >100,000 CFU/mL Mixed Karla Isolated  --        Test Results Pending at Discharge     Pending Labs     Order Current Status    Basic Metabolic Panel In process    C-reactive Protein In process    Hepatic Function Panel In process    Blood Culture - Blood, Arm, Left Preliminary result    Blood Culture - Blood, Arm, Right Preliminary result          Discharge Details        Discharge Medications      New Medications      Instructions Start Date   amoxicillin-clavulanate 875-125 MG per tablet  Commonly known as: AUGMENTIN   1 tablet, Oral, Every 12 Hours Scheduled      ferrous sulfate 325 (65 FE) MG tablet   325 mg, Oral, Daily With Breakfast      pantoprazole 40 MG EC tablet  Commonly known as: PROTONIX   40 mg, Oral, Every Early Morning   Start Date: January 1, 2023        Continue These Medications      Instructions Start Date   multivitamin with minerals tablet tablet   1 tablet, Oral, Daily      mycophenolate 250 MG capsule  Commonly known as: CELLCEPT   250 mg, Oral, Every Morning      nadolol 20 MG tablet  Commonly known as: CORGARD   20 mg, Oral, Daily      tacrolimus 1 MG capsule  Commonly known as: PROGRAF   1 mg, Oral, 2 Times Daily             No Known Allergies      Discharge Disposition:  Home or Self Care    Discharge Diet:  Diet Order   Procedures   • Diet: Cardiac Diets; Low Sodium (2g); Texture: Regular Texture (IDDSI 7); Fluid Consistency: Thin (IDDSI 0)       Discharge Activity:   Activity Instructions     Activity as Tolerated            CODE  STATUS:    Code Status and Medical Interventions:   Ordered at: 12/27/22 0213     Level Of Support Discussed With:    Patient     Code Status (Patient has no pulse and is not breathing):    CPR (Attempt to Resuscitate)     Medical Interventions (Patient has pulse or is breathing):    Full Support     Release to patient:    Routine Release       No future appointments.  Additional Instructions for the Follow-ups that You Need to Schedule     Call MD With Problems / Concerns   As directed      Instructions: return to the hospital if you experience chest pain, shortness of breath, abdominal pain, nausea, vomiting, fevers, sweats, chills, or worsening of your symptoms    Order Comments: Instructions: return to the hospital if you experience chest pain, shortness of breath, abdominal pain, nausea, vomiting, fevers, sweats, chills, or worsening of your symptoms          Discharge Follow-up with PCP   As directed       Currently Documented PCP:    Morales Alan MD    PCP Phone Number:    [phone]     Follow Up Details: 2 weeks         Discharge Follow-up with Specialty: gastroenterology as directed   As directed      Specialty: gastroenterology as directed            Follow-up Information     Morales Alan MD .    Specialty: Family Medicine  Why: 2 weeks  Contact information:  [address] [address]  [phone]                         Additional Instructions for the Follow-ups that You Need to Schedule     Call MD With Problems / Concerns   As directed      Instructions: return to the hospital if you experience chest pain, shortness of breath, abdominal pain, nausea, vomiting, fevers, sweats, chills, or worsening of your symptoms    Order Comments: Instructions: return to the hospital if you experience chest pain, shortness of breath, abdominal pain, nausea, vomiting, fevers, sweats, chills, or worsening of your symptoms          Discharge Follow-up with PCP   As  directed       Currently Documented PCP:    Morales Alan MD    PCP Phone Number:    [phone]     Follow Up Details: 2 weeks         Discharge Follow-up with Specialty: gastroenterology as directed   As directed      Specialty: gastroenterology as directed           Time Spent on Discharge:  Greater than 30 minutes      Pablo Grove MD  [facility]  12/31/22  10:30 EST

## 2022-12-31 NOTE — PLAN OF CARE
Problem: Adult Inpatient Plan of Care  Goal: Plan of Care Review  Outcome: Ongoing, Progressing  Flowsheets  Taken 12/31/2022 1113 by Yolis Mcneal, RN  Plan of Care Reviewed With: patient  Outcome Evaluation:   patient d/c home today, remains on ra   SR on monitor   denies SOA or CP   no acute distress   VSS  Taken 12/30/2022 0500 by Cheryl Doss RN  Progress: improving  Goal: Patient-Specific Goal (Individualized)  Outcome: Ongoing, Progressing  Goal: Absence of Hospital-Acquired Illness or Injury  Outcome: Ongoing, Progressing  Goal: Optimal Comfort and Wellbeing  Outcome: Ongoing, Progressing  Goal: Readiness for Transition of Care  Outcome: Ongoing, Progressing     Problem: Fall Injury Risk  Goal: Absence of Fall and Fall-Related Injury  Outcome: Ongoing, Progressing     Problem: Pain Acute  Goal: Acceptable Pain Control and Functional Ability  Outcome: Ongoing, Progressing   Goal Outcome Evaluation:  Plan of Care Reviewed With: patient           Outcome Evaluation: patient d/c home today, remains on ra; SR on monitor; denies SOA or CP; no acute distress; VSS

## 2023-01-01 NOTE — PAYOR COMM NOTE
"Jacey Clemente (67 y.o. Female)       ATTN: DISCHARGE SUMMARY FOR REVIEW: [id number]     DEPT: [phone],   [phone]    [facility]: NPI [id number]      Date of Birth   1955    Social Security Number       Address   [address] [place], [place] [address]    Home Phone   [phone]    MRN   [id number]       Christian   None    Marital Status                               Admission Date   12/26/22    Admission Type   Emergency    Admitting Provider   Sylvia Ceja MD    Attending Provider       Department, Room/Bed   [address], [address]/1       Discharge Date   12/31/2022    Discharge Disposition   Home or Self Care    Discharge Destination                               Attending Provider: (none)   Allergies: No Known Allergies    Isolation: None   Infection: MRSA (12/27/22), COVID (confirmed) (12/27/22), C.difficile (rule out) (12/30/22)   Code Status: Prior    Ht: 180.3 cm (71\")   Wt: 88.7 kg (195 lb 9.6 oz)    Admission Cmt: None   Principal Problem: Pneumonia due to COVID-19 virus [U07.1,J12.82]                 Active Insurance as of 12/26/2022     Primary Coverage     Payor Plan Insurance Group Employer/Plan Group    [facility] [employer] EMPLOYEE [id number]     Payor Plan Address Payor Plan Phone Number Payor Plan Fax Number Effective Dates    [address] [phone]  1/1/2022 - None Entered    [place] [address]       Subscriber Name Subscriber Birth Date Member ID       YOHANNES CLEMENTE CANDY 2/28/1954 [id number]                 Emergency Contacts      (Rel.) Home Phone Work Phone Mobile Phone    YOHANNES CLEMENTE (Spouse) -- -- [phone]               Operative/Procedure Notes (all)      Procedures signed by Kristin Thao MD at 12/29/22 1413   Version 1 of 1     Procedure Orders    1. UPPER GI ENDOSCOPY [[id number]] ordered by Kristin Thao MD at 12/29/22 1339           [Media Unavailable] Scan on 12/29/2022 " 1339 by Kristin Thao MD: EGD          Electronically signed by Kristin Thao MD at 22 1413          Discharge Summary      Pablo Grove MD at 22 1030              Patient Name: Jacey Valdez  : 1955  MRN: [id number]    Date of Admission: 2022  Date of Discharge:  2022  Primary Care Physician: Morales Alan MD      Chief Complaint:   Abdominal Pain      Discharge Diagnoses     Active Hospital Problems    Diagnosis  POA   • Cirrhosis of liver (HCC) [K74.60]  Yes   • History of kidney transplant [Z94.0]  Not Applicable      Resolved Hospital Problems    Diagnosis Date Resolved POA   • **Pneumonia due to COVID-19 virus [U07.1, J12.82] 2022 Yes   • Cytokine release syndrome, grade 2 [D89.832] 2022 No   • Right upper quadrant abdominal pain [R10.11] 2022 Yes   • Acalculous cholecystitis [K81.9] 2022 Yes   • Duodenitis [K29.80] 2022 Yes   • Multifocal pneumonia [J18.9] 2022 Yes        Hospital Course     Ms. Valdez is a 67 y.o. female with a history of polycystic kidney disease s/p renal transplantation on cellcept and tacrolimus, cirrhosis with esophageal varices thought to be related to her history of polycystic kidney disease who presented to [facility] initially complaining of abdominal pain, cough, shortness of breath.  Please see the admitting history and physical for further details.  She was found to have acute respiratory failure with hypoxia from covid-19 pneumonia and acalculous cholecystitis as well as duodenitis seen on CT and was admitted to the hospital for further evaluation and treatment.      She was seen in consultation by nephrology, GI, general surgery. She was started on zosyn, dexamethasone, and remdesivir. General surgery felt that with her varices/cirrhosis she was not a good candidate for cholecystectomy and instead if not improving on abx she should have a percutaneous cholecystostomy tube.  Thankfully, she improved quickly and was transitioned to oral augmentin for a total of 14 days of therapy. With regard to her covid, she improved and after a couple of days was on room air. Gastroenterology did take her for EGD to evaluate the duodenitis seen on the admission CT scan. This showed grade 2 esophgeal varices, portal hypertensive gastropathy, gastritis, and a normal duodenem. She was started on a PPI. She's doing well today and has no new complains and so will be discharged home. She was told that she will need to isolate for 20 days from admission given her immunocompromised state.     Day of Discharge     Subjective:  No events overnight. She's feeling well and has no complaints. Diarrhea is resolving. C. Diff testing was negative.    Physical Exam:  Temp:  [97.3 °F (36.3 °C)-98 °F (36.7 °C)] 97.4 °F (36.3 °C)  Heart Rate:  [60-65] 65  Resp:  [18] 18  BP: (131-138)/(79-98) 138/98  Body mass index is 27.28 kg/m².  Physical Exam  Constitutional:       General: She is not in acute distress.     Appearance: She is not toxic-appearing.   Cardiovascular:      Rate and Rhythm: Normal rate and regular rhythm.      Heart sounds: Normal heart sounds.   Pulmonary:      Effort: No respiratory distress.      Breath sounds: Normal breath sounds. No wheezing, rhonchi or rales.   Abdominal:      General: Bowel sounds are normal. There is no distension.      Palpations: Abdomen is soft.      Tenderness: There is no abdominal tenderness.   Musculoskeletal:         General: No tenderness.      Right lower leg: No edema.      Left lower leg: No edema.   Neurological:      Mental Status: She is alert.   Psychiatric:         Mood and Affect: Mood normal.         Behavior: Behavior normal.         Consultants     Consult Orders (all) (From admission, onward)     Start     Ordered    12/28/22 0869  Inpatient Consult to Advance Care Planning  Once        Provider:  (Not yet assigned)    12/28/22 1518    12/27/22 3227   Inpatient Nephrology Consult  Once        Specialty:  Nephrology  Provider:  Cornelio Porter MD    12/27/22 1518    12/27/22 1441  Inpatient Infection Control Nurse Consult  Once        Provider:  (Not yet assigned)    12/27/22 1441    12/27/22 0318  Inpatient Infection Control Nurse Consult  Once        Provider:  (Not yet assigned)    12/27/22 0317    12/27/22 0213  Inpatient Gastroenterology Consult  Once        Specialty:  Gastroenterology  Provider:  Kristin Thao MD    12/27/22 0212 12/26/22 2148  Inpatient General Surgery Consult  Once        Specialty:  General Surgery  Provider:  (Not yet assigned)    12/26/22 2149 12/1955  [facility] (on-call MD unless specified) Details  Once        Specialty:  Hospitalist  Provider:  (Not yet assigned)    12/1955              Procedures     Imaging Results (All)     Procedure Component Value Units Date/Time    CT Abdomen Pelvis With Contrast [[id number]] Collected: 12/26/22 1939     Updated: 12/26/22 2012    Narrative:      CT ABDOMEN PELVIS W CONTRAST-     Radiation dose reduction techniques were utilized, including automated  exposure control and exposure modulation based on body size.           Clinical: Right upper quadrant pain, febrile     Correlation with gallbladder ultrasound earlier at 1831 hours.     FINDINGS: There is mild thickening and irregularity of the gallbladder  wall which was not appreciated on the ultrasound examination.  Potentially calculus cholecystitis. CBD is dilated, 11 mm in diameter.  This smoothly tapers to the ampulla. No stone demonstrated. Possible  small diverticulum at this location measuring 13 mm, partially filled  with gas.     The liver is grossly enlarged and heterogenous in attenuation with  superficial irregularity and a nodular appearance consistent with  cirrhosis. Areas of diminished attenuation likely fatty infiltration.  Underlying diffuse hepatocellular disease or neoplasm possible. The  spleen is  enlarged and there is extensive collateral vessel formation  throughout the upper abdomen with recannulization of the umbilical vein  consistent with portal hypertension. Within the right lobe of the liver  there is a 14 mm area of nodularity having the appearance of a cyst. The  pancreas is satisfactory in appearance.     Both adrenal glands are normal. The native kidneys are small and there  is nephrocalcinosis. There is 2 cm right renal cyst. Right pelvic renal  transplant demonstrates a normal pattern of enhancement, no obstructive  uropathy mass or calculus. There is wall thickening demonstrated along  the right lateral border of the urinary bladder. There is some serosal  spiculation at this location. Cystitis not excluded. Tiny gas bubble is  noted within, has a been recent catheterization?     There is diverticulosis of the colon. No indication of diverticulitis.  Within the midabdomen there is a thick-walled segment of jejunum  measuring approximately 12 cm in length. This consistent with enteritis.  The stomach is collapsed. Duodenal bulb is typical in appearance. There  is some fold thickening and irregularity of the second third and fourth  portions of the duodenum suggestive of duodenitis.     No free air or free intraperitoneal fluid. The uterus is small in size,  appropriate for age, no ovarian abnormality. There are Tarlov cysts.     Conclusion:  1. The liver is grossly abnormal, it is enlarged with surface  irregularity and heterogenous. The appearance is consistent with  cirrhosis with likely fatty infiltration. Underlying hepatitis or  hepatocellular carcinoma not excluded. There is splenomegaly and  extensive collateral vessel formation consistent with portal  hypertension.  2. There is gallbladder wall thickening and irregularity, possible  acalculus cholecystitis. CBD is dilated at 11 mm with smooth tapering to  the ampulla. No definite stone identified. At this location there  appears to be a  partially gas-filled diverticulum.  3. Renal transplant is satisfactory in appearance. There is asymmetric  wall thickening of the right bladder with serosal irregularity/edema  worrisome for cystitis.  4. Long segment of jejunum which demonstrates wall thickening. In  addition there is fold irregularity of the duodenum, findings suggest  enteritis/duodenitis.  5. Diverticulosis of the colon. No diverticulitis.     MRI/MRCP would be helpful as follow-up.        This report was finalized on 12/26/2022 8:09 PM by Dr. Tong Noriega M.D.       XR Chest 1 View [[id number]] Collected: 12/26/22 1922     Updated: 12/26/22 1930    Narrative:      SINGLE VIEW OF THE CHEST     HISTORY: Fever     COMPARISON: None available.     FINDINGS:  Cardiomegaly is present. There is tortuosity of the aorta. No  pneumothorax is seen. There is some blunting the left costophrenic  angle. There are multifocal pulmonary opacities which are noted within  the right upper lobe, concerning for pneumonia. There is some additional  mild consolidation noted at the lung bases bilaterally. This may  represent atelectasis and/or scarring, although additional infiltrate is  not excluded.       Impression:      Multifocal pulmonary opacities, concerning for pneumonia.     This report was finalized on 12/26/2022 7:24 PM by Dr. Jackelyn Merritt M.D.       US Gallbladder [[id number]] Collected: 12/26/22 1857     Updated: 12/26/22 1908    Narrative:      US GALLBLADDER-clinical: Fever with right upper quadrant pain     Renal transplant     COMPARISON: None     FINDINGS: Only a small portion of the pancreatic body could be  visualized and is within normal limits however the majority is obscured.  No pancreatic ductal dilatation seen. No peripancreatic fluid  identified.     There is some coarsening of the overall hepatic echotexture in addition  there is some surface irregularity of the liver, the findings are  worrisome for cirrhosis. No intrahepatic ductal  dilatation.     The native right kidney is small, 4 cm in length. The transplant is 11  cm in length. Transplant cortical echotexture is within normal limits,  no obstructive uropathy calculus or mass is demonstrated. No renal RI  calculated.     The gallbladder is satisfactory in appearance. No gallstones or  gallbladder wall thickening nor pericholecystic fluid. CBD is dilated at  12 mm. Pancreatic protocol CT would be the best means for further  evaluation. No free fluid is demonstrated within the upper abdomen.     CONCLUSION: The gallbladder is satisfactory in appearance, no gallstones  seen. CBD diameter however is abnormal at 12 mm. Only small portion of  the pancreatic body could be visualized. Pancreatic protocol CT would be  the best means for further evaluation if possible.     The right renal transplant is satisfactory in appearance. There is  coarsening of the hepatic echotexture with surface irregularity  worrisome for cirrhosis. No free fluid is demonstrated within the right  upper quadrant.     This report was finalized on 12/26/2022 7:05 PM by Dr. Tong Noriega M.D.             Pertinent Labs     Results from last 7 days   Lab Units 12/31/22  0914 12/30/22  0734 12/29/22  1122 12/28/22  0917   WBC 10*3/mm3 4.97 3.43 3.48 2.00*   HEMOGLOBIN g/dL 11.6* 10.2* 10.4* 9.8*   PLATELETS 10*3/mm3 159 172 153 132*     Results from last 7 days   Lab Units 12/30/22  0734 12/29/22  1122 12/28/22  0917 12/27/22  1724 12/27/22  0945   SODIUM mmol/L 138 137 138  --  138   POTASSIUM mmol/L 4.2 4.0 4.4  --  4.0   CHLORIDE mmol/L 107 106 105  --  107   CO2 mmol/L 22.7 25.7 25.0  --  24.6   BUN mg/dL 23 22 19  --  17   CREATININE mg/dL 0.86 0.96 0.92 0.90 0.85   GLUCOSE mg/dL 109* 96 112*  --  124*   Estimated Creatinine Clearance: 76 mL/min (by C-G formula based on SCr of 0.86 mg/dL).  Results from last 7 days   Lab Units 12/30/22  0734 12/29/22  1122 12/28/22  0917 12/27/22  1724   ALBUMIN g/dL 2.6* 2.7* 2.5* 2.5*    BILIRUBIN mg/dL 0.3 0.4 0.3 0.4   ALK PHOS U/L 71 73 78 76   AST (SGOT) U/L 30 27 24 26   ALT (SGPT) U/L 22 22 19 24     Results from last 7 days   Lab Units 12/30/22  0734 12/29/22  1122 12/28/22  0917 12/27/22  1724 12/27/22  0945   CALCIUM mg/dL 9.1 9.4 9.4  --  8.9   ALBUMIN g/dL 2.6* 2.7* 2.5* 2.5*  --    MAGNESIUM mg/dL 1.7  --  1.7  --  1.8   PHOSPHORUS mg/dL 2.8 2.5 3.2  --  2.3*     Results from last 7 days   Lab Units 12/26/22  1755   LIPASE U/L 36             Invalid input(s): LDLCALC  Results from last 7 days   Lab Units 12/26/22  2239 12/26/22  1755   BLOODCX   --  No growth at 4 days  No growth at 4 days   URINECX  >100,000 CFU/mL Mixed Karla Isolated  --        Test Results Pending at Discharge     Pending Labs     Order Current Status    Basic Metabolic Panel In process    C-reactive Protein In process    Hepatic Function Panel In process    Blood Culture - Blood, Arm, Left Preliminary result    Blood Culture - Blood, Arm, Right Preliminary result          Discharge Details        Discharge Medications      New Medications      Instructions Start Date   amoxicillin-clavulanate 875-125 MG per tablet  Commonly known as: AUGMENTIN   1 tablet, Oral, Every 12 Hours Scheduled      ferrous sulfate 325 (65 FE) MG tablet   325 mg, Oral, Daily With Breakfast      pantoprazole 40 MG EC tablet  Commonly known as: PROTONIX   40 mg, Oral, Every Early Morning   Start Date: January 1, 2023        Continue These Medications      Instructions Start Date   multivitamin with minerals tablet tablet   1 tablet, Oral, Daily      mycophenolate 250 MG capsule  Commonly known as: CELLCEPT   250 mg, Oral, Every Morning      nadolol 20 MG tablet  Commonly known as: CORGARD   20 mg, Oral, Daily      tacrolimus 1 MG capsule  Commonly known as: PROGRAF   1 mg, Oral, 2 Times Daily             No Known Allergies      Discharge Disposition:  Home or Self Care    Discharge Diet:  Diet Order   Procedures   • Diet: Cardiac Diets; Low  Sodium (2g); Texture: Regular Texture (IDDSI 7); Fluid Consistency: Thin (IDDSI 0)       Discharge Activity:   Activity Instructions     Activity as Tolerated            CODE STATUS:    Code Status and Medical Interventions:   Ordered at: 12/27/22 0213     Level Of Support Discussed With:    Patient     Code Status (Patient has no pulse and is not breathing):    CPR (Attempt to Resuscitate)     Medical Interventions (Patient has pulse or is breathing):    Full Support     Release to patient:    Routine Release       No future appointments.  Additional Instructions for the Follow-ups that You Need to Schedule     Call MD With Problems / Concerns   As directed      Instructions: return to the hospital if you experience chest pain, shortness of breath, abdominal pain, nausea, vomiting, fevers, sweats, chills, or worsening of your symptoms    Order Comments: Instructions: return to the hospital if you experience chest pain, shortness of breath, abdominal pain, nausea, vomiting, fevers, sweats, chills, or worsening of your symptoms          Discharge Follow-up with PCP   As directed       Currently Documented PCP:    Morales Alan MD    PCP Phone Number:    [phone]     Follow Up Details: 2 weeks         Discharge Follow-up with Specialty: gastroenterology as directed   As directed      Specialty: gastroenterology as directed            Follow-up Information     Morales Alan MD .    Specialty: Family Medicine  Why: 2 weeks  Contact information:  [address] [place] [address]  [phone]                         Additional Instructions for the Follow-ups that You Need to Schedule     Call MD With Problems / Concerns   As directed      Instructions: return to the hospital if you experience chest pain, shortness of breath, abdominal pain, nausea, vomiting, fevers, sweats, chills, or worsening of your symptoms    Order Comments: Instructions: return to the hospital if you  experience chest pain, shortness of breath, abdominal pain, nausea, vomiting, fevers, sweats, chills, or worsening of your symptoms          Discharge Follow-up with PCP   As directed       Currently Documented PCP:    Morales Alan MD    PCP Phone Number:    [phone]     Follow Up Details: 2 weeks         Discharge Follow-up with Specialty: gastroenterology as directed   As directed      Specialty: gastroenterology as directed           Time Spent on Discharge:  Greater than 30 minutes      Lynda Lan MD  [facility]  12/31/22  10:30 EST              Electronically signed by Lynda Lan MD at 12/31/22 1036       Discharge Order (From admission, onward)     Start     Ordered    12/31/22 0929  Discharge patient  Once,   Status:  Canceled        Expected Discharge Date: 12/31/22    Discharge Disposition: Home or Self Care    Physician of Record for Attribution - Please select from Treatment Team: LYNDA LAN [[id number]]    Review needed by CMO to determine Physician of Record: No       Question Answer Comment   Physician of Record for Attribution - Please select from Treatment Team LYNDA LAN    Review needed by CMO to determine Physician of Record No        12/31/22 0928 12/31/22 0844  Discharge patient  Once        Expected Discharge Date: 12/31/22    Discharge Disposition: Home or Self Care    Physician of Record for Attribution - Please select from Treatment Team: LYNDA LAN [[id number]]    Review needed by CMO to determine Physician of Record: No       Question Answer Comment   Physician of Record for Attribution - Please select from Treatment Team LYNDA LAN    Review needed by CMO to determine Physician of Record No        12/31/22 0844

## 2023-01-02 NOTE — CASE MANAGEMENT/SOCIAL WORK
Case Management Discharge Note      Final Note: home no needs         Selected Continued Care - Discharged on 12/31/2022 Admission date: 12/26/2022 - Discharge disposition: Home or Self Care    Destination    No services have been selected for the patient.              Durable Medical Equipment    No services have been selected for the patient.              Dialysis/Infusion    No services have been selected for the patient.              Home Medical Care    No services have been selected for the patient.              Therapy    No services have been selected for the patient.              Community Resources    No services have been selected for the patient.              Community & DME    No services have been selected for the patient.                  Transportation Services  Private: Car    Final Discharge Disposition Code: 01 - home or self-care

## 2023-01-04 ENCOUNTER — READMISSION MANAGEMENT (OUTPATIENT)
Dept: CALL CENTER | Facility: HOSPITAL | Age: 68
End: 2023-01-04
Payer: COMMERCIAL

## 2023-01-06 ENCOUNTER — READMISSION MANAGEMENT (OUTPATIENT)
Dept: CALL CENTER | Facility: HOSPITAL | Age: 68
End: 2023-01-06
Payer: COMMERCIAL

## 2023-01-06 NOTE — OUTREACH NOTE
COPD/PN Week 1 Survey    Flowsheet Row Responses   [facility] [place], operated by [facility] patient discharged from? [facility]   Does the patient have one of the following disease processes/diagnoses(primary or secondary)? Pneumonia   Week 1 attempt successful? 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    Discharge diagnosis Pneumonia due to COVID Cirrhosis of liver Acalculous cholecystitis    Person spoke with today (if not patient) and relationship YOHANNES CLEMENTE Spouse    Meds reviewed with patient/caregiver? Yes   Does the patient have all medications ordered at discharge? Yes   Is the patient taking all medications as directed (includes completed medication regime)? Yes   Does the patient have a primary care provider?  Yes   Does the patient have an appointment with their PCP or specialist within 7 days of discharge? No   Comments regarding PCP Follow up with Morales Alan MD 2 weeks   Nursing Interventions Educated patient on importance of making appointment, Advised patient to make appointment   Has the patient kept scheduled appointments due by today? N/A   Comments patient to also f/u with gastroenterology   Has home health visited the patient within 72 hours of discharge? N/A   Pulse Ox monitoring None   Psychosocial issues? No   Did the patient receive a copy of their discharge instructions? Yes   Nursing interventions Reviewed instructions with patient  [spouse]   What is the patient's perception of their health status since discharge? Improving   If the patient is a current smoker, are they able to teach back resources for cessation? Not a smoker   Is the patient/caregiver able to teach back the hierarchy of who to call/visit for symptoms/problems? PCP, Specialist, Home health nurse, Urgent Care, ED, 911 Yes   Is the patient/caregiver able to teach back signs and symptoms of worsening condition: Fever/chills, Shortness  of breath, Chest pain   Is the patient/caregiver able to teach back importance of completing antibiotic course of treatment? Yes   Week 1 call completed? Yes   Wrap up additional comments  reports patient getting good rest at home,  eating well. Reports some increased LE edema.           JASON CHENG - Registered Nurse

## 2023-01-16 ENCOUNTER — READMISSION MANAGEMENT (OUTPATIENT)
Dept: CALL CENTER | Facility: HOSPITAL | Age: 68
End: 2023-01-16
Payer: COMMERCIAL

## 2023-01-16 NOTE — OUTREACH NOTE
COPD/PN Week 2 Survey    Flowsheet Row Responses   [facility], [place], operated by [facility] patient discharged from? [place]   Does the patient have one of the following disease processes/diagnoses(primary or secondary)? Pneumonia   Week 2 attempt successful? Yes   Call start time 1049   Call end time 1053   Discharge diagnosis Pneumonia due to COVID Cirrhosis of liver Acalculous cholecystitis    Person spoke with today (if not patient) and relationship YOHANNES CLEMENTE Spouse    Meds reviewed with patient/caregiver? Yes   Does the patient have all medications ordered at discharge? Yes   Is the patient taking all medications as directed (includes completed medication regime)? Yes   Does the patient have a primary care provider?  Yes   Does the patient have an appointment with their PCP or specialist within 7 days of discharge? No   Nursing Interventions Educated patient on importance of making appointment, Advised patient to make appointment   Has the patient kept scheduled appointments due by today? N/A   Comments patient to also f/u with gastroenterology   Has home health visited the patient within 72 hours of discharge? N/A   Pulse Ox monitoring None   Psychosocial issues? No   Did the patient receive a copy of their discharge instructions? Yes   Nursing interventions Reviewed instructions with patient   What is the patient's perception of their health status since discharge? Improving  [ reports that patient is up moving around more, showing improvement. ]   If the patient is a current smoker, are they able to teach back resources for cessation? Not a smoker   Is the patient/caregiver able to teach back the hierarchy of who to call/visit for symptoms/problems? PCP, Specialist, Home health nurse, Urgent Care, ED, 911 Yes   Week 2 call completed? Yes   Wrap up additional comments  denies any new questions or concerns this week.           JASON CHENG - Registered Nurse

## 2023-02-27 ENCOUNTER — TRANSCRIBE ORDERS (OUTPATIENT)
Dept: ADMINISTRATIVE | Facility: HOSPITAL | Age: 68
End: 2023-02-27
Payer: COMMERCIAL

## 2023-02-27 ENCOUNTER — OFFICE (OUTPATIENT)
Dept: URBAN - METROPOLITAN AREA CLINIC 76 | Facility: CLINIC | Age: 68
End: 2023-02-27

## 2023-02-27 VITALS
HEART RATE: 77 BPM | HEIGHT: 71 IN | DIASTOLIC BLOOD PRESSURE: 80 MMHG | OXYGEN SATURATION: 94 % | WEIGHT: 189 LBS | SYSTOLIC BLOOD PRESSURE: 140 MMHG

## 2023-02-27 DIAGNOSIS — K58.0 IRRITABLE BOWEL SYNDROME WITH DIARRHEA: ICD-10-CM

## 2023-02-27 DIAGNOSIS — I85.00 ESOPHAGEAL VARICES WITHOUT BLEEDING: ICD-10-CM

## 2023-02-27 DIAGNOSIS — K63.9 THICKENED SMALL BOWEL: Primary | ICD-10-CM

## 2023-02-27 PROBLEM — K31.89 OTHER DISEASES OF STOMACH AND DUODENUM: Status: ACTIVE | Noted: 2020-01-07

## 2023-02-27 PROCEDURE — 99214 OFFICE O/P EST MOD 30 MIN: CPT | Performed by: INTERNAL MEDICINE

## 2023-03-30 ENCOUNTER — HOSPITAL ENCOUNTER (OUTPATIENT)
Dept: GENERAL RADIOLOGY | Facility: HOSPITAL | Age: 68
Discharge: HOME | End: 2023-03-30
Payer: COMMERCIAL

## 2023-03-30 ENCOUNTER — HOSPITAL ENCOUNTER (OUTPATIENT)
Dept: MRI IMAGING | Facility: HOSPITAL | Age: 68
Discharge: HOME | End: 2023-03-30
Payer: COMMERCIAL

## 2023-03-30 ENCOUNTER — APPOINTMENT (OUTPATIENT)
Dept: OTHER | Facility: HOSPITAL | Age: 68
End: 2023-03-30
Payer: COMMERCIAL

## 2023-03-30 DIAGNOSIS — K63.9 THICKENED SMALL BOWEL: ICD-10-CM

## 2023-03-30 LAB — CREAT BLDA-MCNC: 1 MG/DL (ref 0.6–1.3)

## 2023-03-30 PROCEDURE — 74183 MRI ABD W/O CNTR FLWD CNTR: CPT

## 2023-03-30 PROCEDURE — 74248 X-RAY SM INT F-THRU STD: CPT

## 2023-03-30 PROCEDURE — A9577 INJ MULTIHANCE: HCPCS | Performed by: INTERNAL MEDICINE

## 2023-03-30 PROCEDURE — 74240 X-RAY XM UPR GI TRC 1CNTRST: CPT

## 2023-03-30 PROCEDURE — 0 GADOBENATE DIMEGLUMINE 529 MG/ML SOLUTION: Performed by: INTERNAL MEDICINE

## 2023-03-30 PROCEDURE — 82565 ASSAY OF CREATININE: CPT

## 2023-03-30 RX ADMIN — BARIUM SULFATE 183 ML: 960 POWDER, FOR SUSPENSION ORAL at 09:22

## 2023-03-30 RX ADMIN — GADOBENATE DIMEGLUMINE 18 ML: 529 INJECTION, SOLUTION INTRAVENOUS at 07:25

## 2023-07-06 ENCOUNTER — OFFICE (OUTPATIENT)
Dept: URBAN - METROPOLITAN AREA CLINIC 76 | Facility: CLINIC | Age: 68
End: 2023-07-06

## 2023-07-06 VITALS
OXYGEN SATURATION: 97 % | DIASTOLIC BLOOD PRESSURE: 86 MMHG | WEIGHT: 186 LBS | HEART RATE: 74 BPM | DIASTOLIC BLOOD PRESSURE: 91 MMHG | SYSTOLIC BLOOD PRESSURE: 141 MMHG | SYSTOLIC BLOOD PRESSURE: 143 MMHG | HEIGHT: 71 IN

## 2023-07-06 DIAGNOSIS — K58.0 IRRITABLE BOWEL SYNDROME WITH DIARRHEA: ICD-10-CM

## 2023-07-06 DIAGNOSIS — R93.2 ABNORMAL FINDINGS ON DIAGNOSTIC IMAGING OF LIVER AND BILIARY: ICD-10-CM

## 2023-07-06 DIAGNOSIS — I85.00 ESOPHAGEAL VARICES WITHOUT BLEEDING: ICD-10-CM

## 2023-07-06 DIAGNOSIS — K74.60 UNSPECIFIED CIRRHOSIS OF LIVER: ICD-10-CM

## 2023-07-06 PROCEDURE — 99214 OFFICE O/P EST MOD 30 MIN: CPT | Performed by: INTERNAL MEDICINE

## 2023-09-22 ENCOUNTER — OFFICE VISIT (OUTPATIENT)
Dept: CARDIOLOGY | Facility: CLINIC | Age: 68
End: 2023-09-22
Payer: COMMERCIAL

## 2023-09-22 VITALS
OXYGEN SATURATION: 99 % | HEART RATE: 71 BPM | DIASTOLIC BLOOD PRESSURE: 70 MMHG | HEIGHT: 71 IN | SYSTOLIC BLOOD PRESSURE: 128 MMHG | WEIGHT: 181 LBS | RESPIRATION RATE: 18 BRPM | BODY MASS INDEX: 25.34 KG/M2

## 2023-09-22 DIAGNOSIS — R06.09 DYSPNEA ON EXERTION: ICD-10-CM

## 2023-09-22 DIAGNOSIS — I10 BENIGN HYPERTENSION: Primary | ICD-10-CM

## 2023-09-22 DIAGNOSIS — K74.60 CIRRHOSIS OF LIVER WITHOUT ASCITES, UNSPECIFIED HEPATIC CIRRHOSIS TYPE: ICD-10-CM

## 2023-09-22 DIAGNOSIS — R00.2 PALPITATIONS: ICD-10-CM

## 2023-09-22 DIAGNOSIS — I47.1 AVNRT (AV NODAL RE-ENTRY TACHYCARDIA): ICD-10-CM

## 2023-09-22 PROCEDURE — 99204 OFFICE O/P NEW MOD 45 MIN: CPT | Performed by: STUDENT IN AN ORGANIZED HEALTH CARE EDUCATION/TRAINING PROGRAM

## 2023-09-22 PROCEDURE — 93000 ELECTROCARDIOGRAM COMPLETE: CPT | Performed by: STUDENT IN AN ORGANIZED HEALTH CARE EDUCATION/TRAINING PROGRAM

## 2023-09-22 NOTE — PROGRESS NOTES
[facility]    Subjective:     Encounter Date:09/22/23      Patient ID: Jacey Valdez is a 68 y.o. female.    Chief Complaint:   Chief Complaint   Patient presents with    Heart Problem     H/o renal transplant   Tachycardia       History of Present Illness    Ms. Valdez is a pleasant 68-year-old lady previous history of ESRD due to polycystic kidney disease, AVNRT status post ablation 2014 status post kidney transplant 2003, course was complicated by CMV and leukopenia.  She also recently has been evaluated with CT scans which revealed portal hypertension and cirrhosis, with varices.  She is set to undergo an EGD soon to reevaluate her varices.    She does have a history of AVNRT, both typical and atypical, and underwent ablation on June 2, 2014 with Dr. Adhikari at [place], where she underwent a catheter ablation of the slow pathway.    Reports that ever since December of this last year, she has had decreased exercise tolerance and lower extremity swelling is slowly been gradually worsening.  She denies any chest pain or chest pressure.  Main complaint is dyspnea.  She has not undergone any cardiac testing for this.  She reports that she has chronic clubbing of her right upper extremity, none of her left.  This is near the site of her AV fistula which has been present for the last 20 years.  She also reports intermittent palpitation episodes which have occurred over the last 2 weeks.  She had a recent viral upper respiratory infection and has noticed palpitations worsened since then.  Her nadolol was recently increased by her nephrologist    Echocardiogram April 2014:  Mild concentric LVH  LVEF 59%  Grade 1 diastolic dysfunction  Mildly enlarged left atrium    Pharmacologic stress test Lexiscan Cardiolite April 2014:  1. Normal IV Lexiscan myocardial perfusion study without evidence of   ischemia or infarction.   2. Normal systolic function with normal ejection fraction of 69%.       The following  "portions of the patient's history were reviewed and updated as appropriate: allergies, current medications, past family history, past medical history, past social history, past surgical history and problem list.    Past Medical History:   Diagnosis Date    Acute cystitis without hematuria 12/27/2022    Kidney transplanted     Right upper quadrant abdominal pain 12/27/2022    Tachycardia     Had ablation       Past Surgical History:   Procedure Laterality Date    ENDOSCOPY N/A 12/29/2022    Procedure: ESOPHAGOGASTRODUODENOSCOPY with biopsies;  Surgeon: Kristin Thao MD;  Location: [facility] ENDOSCOPY;  Service: Gastroenterology;  Laterality: N/A;  Pre: abnormal CT scan  Post: esophageal varacies, portal hypertensive gastropathy, gastritis    PARATHYROIDECTOMY      \"All removed but 1/2 of one\"    TRANSPLANTATION RENAL             ECG 12 Lead    Date/Time: 9/22/2023 1:47 PM  Performed by: Bret Cason MD  Authorized by: Bret Cason MD   Comparison: compared with previous ECG from 12/29/2022  Rhythm: sinus rhythm  Ectopy: atrial premature contractions  Rate: normal  ST Elevation: II, III, aVF, V5 and V6  T Waves: T waves normal  QRS axis: normal  Other findings: early repolarization  Comments: Normal variant EKG, PAC, otherwise           Objective:     Vitals:    09/22/23 1303   BP: 128/70   BP Location: Left arm   Patient Position: Sitting   Cuff Size: Adult   Pulse: 71   Resp: 18   SpO2: 99%   Weight: 82.1 kg (181 lb)   Height: 180.3 cm (71\")         Constitutional:       Appearance: Healthy appearance. Not in distress.   Neck:      Vascular: JVD normal.   Pulmonary:      Effort: Pulmonary effort is normal.      Breath sounds: Normal breath sounds.   Cardiovascular:      PMI at left midclavicular line. Normal rate. Regular rhythm. Normal S2.       Murmurs: There is a grade 2/6 mid frequency blowing holosystolic murmur at the LLSB.   Pulses:     Intact distal pulses.   Edema:     Peripheral edema present.     " Pretibial: bilateral 1+ edema of the pretibial area.     Ankle: bilateral 1+ edema of the ankle.  Skin:     General: Skin is warm and dry.   Neurological:      General: No focal deficit present.      Mental Status: Alert, oriented to person, place, and time and oriented to person, place and time.   Psychiatric:         Mood and Affect: Mood and affect normal.       Lab Review:       BUN   Date Value Ref Range Status   12/31/2022 21 8 - 23 mg/dL Final   01/03/2019 17 7 - 20 mg/dL Final     Creatinine   Date Value Ref Range Status   03/30/2023 1.00 0.60 - 1.30 mg/dL Final     Comment:     Serial Number: [id number]:  [id number]   01/03/2019 0.9 0.7 - 1.5 mg/dL Final     Potassium   Date Value Ref Range Status   12/31/2022 3.9 3.5 - 5.2 mmol/L Final   01/03/2019 4.3 3.5 - 5.1 mmol/L Final     ALT (SGPT)   Date Value Ref Range Status   12/31/2022 19 1 - 33 U/L Final   10/15/2018 33 13 - 69 U/L Final     AST (SGOT)   Date Value Ref Range Status   12/31/2022 27 1 - 32 U/L Final   10/15/2018 37 15 - 46 U/L Final         Performed        Assessment:          Diagnosis Plan   1. Benign hypertension        2. Cirrhosis of liver without ascites, unspecified hepatic cirrhosis type  Adult Transthoracic Echo Complete w/ Color, Spectral and Contrast if Necessary Per Protocol    TSH Rfx On Abnormal To Free T4      3. Dyspnea on exertion  Adult Transthoracic Echo Complete w/ Color, Spectral and Contrast if Necessary Per Protocol    TSH Rfx On Abnormal To Free T4      4. Palpitations  Holter Monitor - 72 Hour Up To 15 Days      5. AVNRT (AV tae re-entry tachycardia)  Holter Monitor - 72 Hour Up To 15 Days             Plan:         Dyspnea on exertion: We will investigate for cardiac cause with echocardiogram.  She does have clubbing of right upper extremity which is quite peculiar, she did report has been chronic ever since her transplant.  Perhaps related to her prior AV fistula.  If there is any evidence of pulmonary hypertension,  or signs of volume overload, we may need to consider right heart cath to investigate for high-output heart failure.  She does have a murmur on exam and a very large right AV fistula, if there is evidence of pulmonary hypertension she may need a right heart cath with fistula occlusion to investigate for high-output heart failure  We will obtain the echo with bubble study to investigate for portal pulmonary syndrome  Palpitations, history of AVNRT status post ablation: Arrange for 7-day patch monitor.  Her symptoms do sound consistent with PVCs, likely benign but starting with the monitor first.  Continue nadolol, for both varices and palpitations  Cirrhosis: She has upcoming EGDs for variceal surveillance.  Currently she is on room air and does not appear volume overloaded centrally, I appreciate no JVD, she has lower extreme edema but there are no preclusions to endoscopy and she is low risk for any endoscopic procedures.  Continue follow-up with GI  Mild resting tremor.  Probably related to tacrolimus, patient has not noted before.  We will also check TSH today    Thank you for allowing me to participate in the care of Jacey Valdez. Feel free to contact me directly with any further questions or concerns.    RTC 3 months for symptom check-in.  Arranging for echocardiogram and patch monitor in the interim.  Possible referral for right heart catheterization there is any evidence of pulmonary hypertension on her echo    Bret Cason MD  [facility]  09/22/23  12:53 EDT       Current Outpatient Medications:     multivitamin with minerals tablet tablet, Take 1 tablet by mouth Daily., Disp: , Rfl:     mycophenolate (CELLCEPT) 250 MG capsule, Take 1 capsule by mouth Every Morning., Disp: , Rfl:     nadolol (CORGARD) 20 MG tablet, Take 2 tablets by mouth Daily., Disp: , Rfl:     tacrolimus (PROGRAF) 1 MG capsule, Take 1 capsule by mouth 2 (Two) Times a Day., Disp: , Rfl:     benzonatate (TESSALON) 200 MG  capsule, Take 1 capsule by mouth 3 (Three) Times a Day As Needed for Cough. (Patient not taking: Reported on 9/22/2023), Disp: 30 capsule, Rfl: 0    fluticasone (FLONASE) 50 MCG/ACT nasal spray, 2 sprays into the nostril(s) as directed by provider Daily. (Patient not taking: Reported on 9/22/2023), Disp: 16 mL, Rfl: 0         Return in about 3 months (around 12/22/2023).      Part of this note may be an electronic transcription/translation of spoken language to printed text using the Dragon Dictation System.

## 2023-09-22 NOTE — PATIENT INSTRUCTIONS
I would recommend we obtain a heart ultrasound to look into your shortness of breath complaints. It could be due to a condition called high output heart failure due to a large AV fistula that causes a lot of blood to recirculate, but the echo will be the first test to sort this out.     We will check thyroid levels to make sure that is not contributing.

## 2023-10-03 ENCOUNTER — HOSPITAL ENCOUNTER (OUTPATIENT)
Dept: CARDIOLOGY | Facility: HOSPITAL | Age: 68
Discharge: HOME | End: 2023-10-03
Admitting: STUDENT IN AN ORGANIZED HEALTH CARE EDUCATION/TRAINING PROGRAM
Payer: COMMERCIAL

## 2023-10-03 VITALS
BODY MASS INDEX: 25.34 KG/M2 | WEIGHT: 181 LBS | SYSTOLIC BLOOD PRESSURE: 122 MMHG | HEART RATE: 78 BPM | HEIGHT: 71 IN | DIASTOLIC BLOOD PRESSURE: 86 MMHG

## 2023-10-03 DIAGNOSIS — K74.60 CIRRHOSIS OF LIVER WITHOUT ASCITES, UNSPECIFIED HEPATIC CIRRHOSIS TYPE: ICD-10-CM

## 2023-10-03 DIAGNOSIS — R06.09 DYSPNEA ON EXERTION: ICD-10-CM

## 2023-10-03 LAB
AORTIC ARCH: 2.6 CM
AORTIC DIMENSIONLESS INDEX: 1.1 (DI)
ASCENDING AORTA: 3.1 CM
BH CV ECHO LEFT VENTRICLE GLOBAL LONGITUDINAL STRAIN: -21.4 %
BH CV ECHO MEAS - ACS: 2.21 CM
BH CV ECHO MEAS - AI P1/2T: 435.9 MSEC
BH CV ECHO MEAS - AO MAX PG: 6.6 MMHG
BH CV ECHO MEAS - AO MEAN PG: 3 MMHG
BH CV ECHO MEAS - AO ROOT DIAM: 3.3 CM
BH CV ECHO MEAS - AO V2 MAX: 128 CM/SEC
BH CV ECHO MEAS - AO V2 VTI: 27.5 CM
BH CV ECHO MEAS - AVA(I,D): 3.6 CM2
BH CV ECHO MEAS - EDV(CUBED): 85.2 ML
BH CV ECHO MEAS - EDV(MOD-SP2): 82 ML
BH CV ECHO MEAS - EDV(MOD-SP4): 132 ML
BH CV ECHO MEAS - EF(MOD-BP): 73.3 %
BH CV ECHO MEAS - EF(MOD-SP2): 73.2 %
BH CV ECHO MEAS - EF(MOD-SP4): 73.5 %
BH CV ECHO MEAS - ESV(CUBED): 25.4 ML
BH CV ECHO MEAS - ESV(MOD-SP2): 22 ML
BH CV ECHO MEAS - ESV(MOD-SP4): 35 ML
BH CV ECHO MEAS - FS: 33.2 %
BH CV ECHO MEAS - IVS/LVPW: 1 CM
BH CV ECHO MEAS - IVSD: 1.3 CM
BH CV ECHO MEAS - LAT PEAK E' VEL: 9.4 CM/SEC
BH CV ECHO MEAS - LV DIASTOLIC VOL/BSA (35-75): 65.3 CM2
BH CV ECHO MEAS - LV MASS(C)D: 215.1 GRAMS
BH CV ECHO MEAS - LV MAX PG: 5.3 MMHG
BH CV ECHO MEAS - LV MEAN PG: 3 MMHG
BH CV ECHO MEAS - LV SYSTOLIC VOL/BSA (12-30): 17.3 CM2
BH CV ECHO MEAS - LV V1 MAX: 115 CM/SEC
BH CV ECHO MEAS - LV V1 VTI: 29.3 CM
BH CV ECHO MEAS - LVIDD: 4.4 CM
BH CV ECHO MEAS - LVIDS: 2.9 CM
BH CV ECHO MEAS - LVOT AREA: 3.4 CM2
BH CV ECHO MEAS - LVOT DIAM: 2.08 CM
BH CV ECHO MEAS - LVPWD: 1.3 CM
BH CV ECHO MEAS - MED PEAK E' VEL: 6.1 CM/SEC
BH CV ECHO MEAS - MR MAX PG: 30.4 MMHG
BH CV ECHO MEAS - MR MAX VEL: 275.5 CM/SEC
BH CV ECHO MEAS - MV A DUR: 0.12 SEC
BH CV ECHO MEAS - MV A MAX VEL: 61.8 CM/SEC
BH CV ECHO MEAS - MV DEC SLOPE: 285.1 CM/SEC2
BH CV ECHO MEAS - MV DEC TIME: 0.26 SEC
BH CV ECHO MEAS - MV E MAX VEL: 78.2 CM/SEC
BH CV ECHO MEAS - MV E/A: 1.27
BH CV ECHO MEAS - MV MAX PG: 3.4 MMHG
BH CV ECHO MEAS - MV MEAN PG: 2.07 MMHG
BH CV ECHO MEAS - MV P1/2T: 92 MSEC
BH CV ECHO MEAS - MV V2 VTI: 25.9 CM
BH CV ECHO MEAS - MVA(P1/2T): 2.39 CM2
BH CV ECHO MEAS - MVA(VTI): 3.8 CM2
BH CV ECHO MEAS - PA ACC TIME: 0.17 SEC
BH CV ECHO MEAS - PA V2 MAX: 97.7 CM/SEC
BH CV ECHO MEAS - PULM A REVS DUR: 0.13 SEC
BH CV ECHO MEAS - PULM A REVS VEL: 27.2 CM/SEC
BH CV ECHO MEAS - PULM DIAS VEL: 45.5 CM/SEC
BH CV ECHO MEAS - PULM S/D: 1.03
BH CV ECHO MEAS - PULM SYS VEL: 47 CM/SEC
BH CV ECHO MEAS - QP/QS: 0.79
BH CV ECHO MEAS - RAP SYSTOLE: 3 MMHG
BH CV ECHO MEAS - RV MAX PG: 1.84 MMHG
BH CV ECHO MEAS - RV V1 MAX: 67.8 CM/SEC
BH CV ECHO MEAS - RV V1 VTI: 20.7 CM
BH CV ECHO MEAS - RVOT DIAM: 2.19 CM
BH CV ECHO MEAS - RVSP: 32 MMHG
BH CV ECHO MEAS - SI(MOD-SP2): 29.7 ML/M2
BH CV ECHO MEAS - SI(MOD-SP4): 48 ML/M2
BH CV ECHO MEAS - SUP REN AO DIAM: 1.5 CM
BH CV ECHO MEAS - SV(LVOT): 99.2 ML
BH CV ECHO MEAS - SV(MOD-SP2): 60 ML
BH CV ECHO MEAS - SV(MOD-SP4): 97 ML
BH CV ECHO MEAS - SV(RVOT): 78.4 ML
BH CV ECHO MEAS - TAPSE (>1.6): 2.6 CM
BH CV ECHO MEAS - TR MAX PG: 29 MMHG
BH CV ECHO MEAS - TR MAX VEL: 267 CM/SEC
BH CV ECHO MEASUREMENTS AVERAGE E/E' RATIO: 10.09
BH CV ECHO SHUNT ASSESSMENT PERFORMED (HIDDEN SCRIPTING): 1
BH CV XLRA - RV BASE: 3.2 CM
BH CV XLRA - RV LENGTH: 6.5 CM
BH CV XLRA - RV MID: 2.9 CM
BH CV XLRA - TDI S': 17.8 CM/SEC
LEFT ATRIUM VOLUME INDEX: 42.5 ML/M2
SINUS: 3.4 CM
STJ: 3.2 CM

## 2023-10-03 PROCEDURE — 93356 MYOCRD STRAIN IMG SPCKL TRCK: CPT

## 2023-10-03 PROCEDURE — 93306 TTE W/DOPPLER COMPLETE: CPT

## 2023-10-03 NOTE — PROGRESS NOTES
Can you please call Mrs. Valdez and let her know that, thankfully, her heart ultrasound is normal.  There are no findings on this ultrasound to suggest any increased heart pressures, any evidence of high blood pressure in the lungs, or any findings that would suggest portal pulmonary hypertension.  There is also no evidence that there is any high-output heart failure due to her fistula.  This is a very reassuring echo overall, and it shows us that her liver issues are not due to any increased pressures or heart problems.

## 2023-10-06 DIAGNOSIS — I47.10 SUSTAINED SVT: Primary | ICD-10-CM

## 2023-10-06 DIAGNOSIS — I49.1 PREMATURE ATRIAL CONTRACTION: ICD-10-CM

## 2023-10-06 DIAGNOSIS — I47.10 PAROXYSMAL SVT (SUPRAVENTRICULAR TACHYCARDIA): ICD-10-CM

## 2023-10-06 RX ORDER — DILTIAZEM HYDROCHLORIDE 180 MG/1
180 CAPSULE, COATED, EXTENDED RELEASE ORAL DAILY
Qty: 30 CAPSULE | Refills: 2 | Status: SHIPPED | OUTPATIENT
Start: 2023-10-06

## 2023-10-09 ENCOUNTER — LAB (OUTPATIENT)
Dept: LAB | Facility: HOSPITAL | Age: 68
End: 2023-10-09
Payer: COMMERCIAL

## 2023-10-09 DIAGNOSIS — I47.10 PAROXYSMAL SVT (SUPRAVENTRICULAR TACHYCARDIA): ICD-10-CM

## 2023-10-09 DIAGNOSIS — K74.60 CIRRHOSIS OF LIVER WITHOUT ASCITES, UNSPECIFIED HEPATIC CIRRHOSIS TYPE: ICD-10-CM

## 2023-10-09 DIAGNOSIS — R06.09 DYSPNEA ON EXERTION: ICD-10-CM

## 2023-10-09 LAB
MAGNESIUM SERPL-MCNC: 1.7 MG/DL (ref 1.6–2.4)
T4 FREE SERPL-MCNC: 1.27 NG/DL (ref 0.93–1.7)
TSH SERPL DL<=0.05 MIU/L-ACNC: 1.32 UIU/ML (ref 0.27–4.2)

## 2023-10-09 PROCEDURE — 84439 ASSAY OF FREE THYROXINE: CPT

## 2023-10-09 PROCEDURE — 84443 ASSAY THYROID STIM HORMONE: CPT

## 2023-10-09 PROCEDURE — 83735 ASSAY OF MAGNESIUM: CPT

## 2023-10-09 PROCEDURE — 36415 COLL VENOUS BLD VENIPUNCTURE: CPT

## 2023-10-09 NOTE — PROGRESS NOTES
Please let patient know that her thyroid results were normal. She continues to have low magnesium at 1.7. For the heart it is recommended to have a magnesium over 2. Please have her start OTC magnesium glycinate or gluconate 200 mg daily.

## 2023-11-13 ENCOUNTER — ON CAMPUS - OUTPATIENT (OUTPATIENT)
Dept: URBAN - METROPOLITAN AREA HOSPITAL 108 | Facility: HOSPITAL | Age: 68
End: 2023-11-13

## 2023-11-13 DIAGNOSIS — I85.00 ESOPHAGEAL VARICES WITHOUT BLEEDING: ICD-10-CM

## 2023-11-13 DIAGNOSIS — K29.50 UNSPECIFIED CHRONIC GASTRITIS WITHOUT BLEEDING: ICD-10-CM

## 2023-11-13 PROCEDURE — 43244 EGD VARICES LIGATION: CPT | Performed by: INTERNAL MEDICINE

## 2023-11-13 PROCEDURE — 43239 EGD BIOPSY SINGLE/MULTIPLE: CPT | Performed by: INTERNAL MEDICINE

## 2023-12-13 ENCOUNTER — OFFICE (OUTPATIENT)
Dept: URBAN - METROPOLITAN AREA CLINIC 76 | Facility: CLINIC | Age: 68
End: 2023-12-13

## 2023-12-13 VITALS
HEIGHT: 71 IN | SYSTOLIC BLOOD PRESSURE: 128 MMHG | HEART RATE: 78 BPM | DIASTOLIC BLOOD PRESSURE: 80 MMHG | OXYGEN SATURATION: 96 % | WEIGHT: 183 LBS

## 2023-12-13 DIAGNOSIS — K74.60 UNSPECIFIED CIRRHOSIS OF LIVER: ICD-10-CM

## 2023-12-13 DIAGNOSIS — I85.00 ESOPHAGEAL VARICES WITHOUT BLEEDING: ICD-10-CM

## 2023-12-13 PROCEDURE — 99214 OFFICE O/P EST MOD 30 MIN: CPT | Performed by: INTERNAL MEDICINE

## 2023-12-26 ENCOUNTER — OFFICE VISIT (OUTPATIENT)
Dept: CARDIOLOGY | Facility: CLINIC | Age: 68
End: 2023-12-26
Payer: COMMERCIAL

## 2023-12-26 VITALS
HEIGHT: 71 IN | WEIGHT: 181.2 LBS | SYSTOLIC BLOOD PRESSURE: 133 MMHG | HEART RATE: 65 BPM | DIASTOLIC BLOOD PRESSURE: 78 MMHG | BODY MASS INDEX: 25.37 KG/M2 | OXYGEN SATURATION: 98 %

## 2023-12-26 DIAGNOSIS — I47.29 NON-SUSTAINED VENTRICULAR TACHYCARDIA: ICD-10-CM

## 2023-12-26 DIAGNOSIS — R06.09 DOE (DYSPNEA ON EXERTION): Primary | ICD-10-CM

## 2023-12-26 DIAGNOSIS — I47.10 PAROXYSMAL SVT (SUPRAVENTRICULAR TACHYCARDIA): ICD-10-CM

## 2023-12-26 DIAGNOSIS — Z94.0 HISTORY OF KIDNEY TRANSPLANT: ICD-10-CM

## 2023-12-26 DIAGNOSIS — R91.8 ABNORMAL CT LUNG SCREENING: ICD-10-CM

## 2023-12-26 RX ORDER — DILTIAZEM HYDROCHLORIDE 180 MG/1
180 CAPSULE, COATED, EXTENDED RELEASE ORAL 2 TIMES DAILY
Qty: 60 CAPSULE | Refills: 2 | Status: SHIPPED | OUTPATIENT
Start: 2023-12-26

## 2023-12-26 NOTE — PROGRESS NOTES
CARDIOLOGY    Date of Office Visit: 2023  Patient Name: Jacey Valdez  : 1955  Encounter Provider: David Damon PA-C  Primary Cardiologist: Bret Cason MD    CHIEF COMPLAINT / REASON FOR OFFICE VISIT     3 month follow up and Shortness of Breath      HISTORY OF PRESENT ILLNESS     This is a 68 y.o. year old female who presents to [facility] CARDIOLOGY for a for a 3 month follow up.     She has a prior history of a prior AV node reentrant ablation 2014 with Dr. Adhikari at [facility].    He was seen in the office by Dr. Cason 2023 having decreased exercise tolerance and increasing lower extremity edema.  He noted that she had chronic clubbing of her right upper extremity due for her AV fistula was.  She was recommended to get a new echocardiogram to evaluate LV and valvular function.  Results below.  Additionally, she had been having increasing palpitations and she was recommended to continue nadolol and complete a 7-day Holter monitor.    Her Holter monitor had showed multiple short runs of nonsustained SVT.  I recommended starting Cardizem 180 mg daily to see if this would help her symptoms and follow-up in the office for further recommendations.    Today the patient reports her palpitations have marginally improved after starting the Cardizem 180 mg daily.  She is currently taking this in the morning and will notice when she does not take it as she has an increase in palpitations.  Palpitations can last for brief episodes but she has also had multiple younger episodes lasting for around 1 or 2 hours.  They do not feel similar to the palpitations that sent her back to the hospital in  they have been worsening and ongoing since she had COVID back in 2022.    She had a CT of her lungs ordered by her nephrologist and it showed mucosal thickening as well as consolidation/mucous plugging.  She has never formally seen a pulmonologist.  She continues to have  "persistent dyspnea on exertion and baseline fatigue that has again been present since December 2022.  They have plans to see a pulmonologist March 21 and I recommended getting a pulmonary function test completed between here and there.    We will temporarily try to increase her diltiazem to 180 mg twice daily and see if this helps her symptoms.  I will follow-up with her on the phone in 2 weeks for evaluation.  I will send a note to her nephrologist to keep them updated on her progress.    PMHx:  HTN, polycystic kidney disease with prior renal transplant 2003 on CellCept, AVNRT s/p ablation 2014, CVM, cirrhosis    PHYSICAL EXAMINATION     Vital Signs:  /78 (BP Location: Left arm, Patient Position: Sitting, Cuff Size: Adult)   Pulse 65   Ht 180.3 cm (71\")   Wt 82.2 kg (181 lb 3.2 oz)   SpO2 98%   BMI 25.27 kg/m²   Estimated body mass index is 25.27 kg/m² as calculated from the following:    Height as of this encounter: 180.3 cm (71\").    Weight as of this encounter: 82.2 kg (181 lb 3.2 oz).             Physical Exam  Constitutional:       Appearance: Normal appearance.   HENT:      Head: Normocephalic and atraumatic.   Cardiovascular:      Rate and Rhythm: Normal rate and regular rhythm.      Pulses: Normal pulses.      Heart sounds: Normal heart sounds.   Pulmonary:      Effort: Pulmonary effort is normal.      Breath sounds: Normal breath sounds.   Musculoskeletal:      Right lower leg: No edema.      Left lower leg: No edema.   Skin:     General: Skin is warm and dry.   Neurological:      General: No focal deficit present.      Mental Status: She is alert and oriented to person, place, and time.          Cardiac Testing/Results     Cardiac Testing:   - Echo 10/3/2023: Normal LV size with EF of 66 to 70%.  GSL -21.4%.  Mild LVH.  Myocardium appears speckled this may due to due to a high gain on the study.  No significant valvular heart disease.  RVSP less than 35 mmHg.    -7-day Holter monitor " 9/22/2023-average heart rate of 73 bpm with a range of 54 to 154 bpm.  64 runs of nonsustained supraventricular tachycardia which the longest lasted for 12.8 seconds.  34 patient triggered episodes correlating to isolated supraventricular ectopy and 1 or 2 runs of nonsustained SVT    Result Review :  The following data was reviewed by: David Damon PA-C on 12/26/2023:       Lab Results   Component Value Date     12/31/2022     12/30/2022    K 3.9 12/31/2022    K 4.2 12/30/2022     (H) 12/31/2022     12/30/2022    CO2 22.6 12/31/2022    CO2 22.7 12/30/2022    BUN 21 12/31/2022    BUN 23 12/30/2022    CREATININE 1.00 03/30/2023    CREATININE 0.86 12/31/2022    EGFRIFAFRI >60 06/24/2022    EGFRIFAFRI >60 07/31/2021    GLUCOSE 117 (H) 12/31/2022    GLUCOSE 109 (H) 12/30/2022    CALCIUM 9.2 12/31/2022    CALCIUM 9.1 12/30/2022    ALBUMIN 2.4 (L) 12/31/2022    ALBUMIN 2.6 (L) 12/30/2022    AST 27 12/31/2022    AST 30 12/30/2022    ALT 19 12/31/2022    ALT 22 12/30/2022     Lab Results   Component Value Date    WBC 4.97 12/31/2022    WBC 3.43 12/30/2022    HGB 11.6 (L) 12/31/2022    HGB 10.2 (L) 12/30/2022    HCT 37.0 12/31/2022    HCT 33.1 (L) 12/30/2022    MCV 86.9 12/31/2022    MCV 84.0 12/30/2022     12/31/2022     12/30/2022     Lab Results   Component Value Date    .6 05/17/2021     Lab Results   Component Value Date    TROPONINI <0.010 05/17/2021     Lab Results   Component Value Date    TSH 1.320 10/09/2023                 ECG 12 Lead    Date/Time: 12/26/2023 2:29 PM  Performed by: David Damon PA-C    Authorized by: David Damon PA-C  Comparison: compared with previous ECG from 9/22/2023  Rhythm: sinus rhythm  Rate: normal  BPM: 65  Conduction: 1st degree AV block  Q waves: III, II, aVF, V6 and V5    QRS axis: normal    Clinical impression: non-specific ECG  Comments: NC interval 216                ASSESSMENT & PLAN       Diagnoses and all  orders for this visit:    1. HENDRICKSON (dyspnea on exertion) (Primary)  -     Complete PFT - Pre & Post Bronchodilator; Future  Ongoing persistent dyspnea on exertion that was noticeable with short walking intervals  Symptoms have been progressive  Recent echocardiogram shows normal ejection fraction with no significant valvular heart disease or wall motion abnormalities  EKG stable with no acute changes.  She has chronic Q waves in the inferior leads that have been unchanged  Recent CT showed evidence of mucous plugging and bronchial thickening.  Recommend pulmonary function test and she has follow-up with pulmonologist in March  2. Non-sustained ventricular tachycardia  Recent Holter monitor showing frequent episodes of nonsustained ventricular tachycardia with longest lasting for 19 beats.    Symptoms have marginally improved since starting Cardizem and we will try increasing it to Cardizem 180 mg twice daily  Currently on nadolol 40 mg daily (increased around 6 weeks ago by nephrologist)  She has a history of a prior AV node reentrant ablation in 2014  If ongoing symptoms we will need to consider repeat evaluation from electrophysiology versus alternative therapies  3. Abnormal CT lung screening  -     Complete PFT - Pre & Post Bronchodilator; Future  Noted recently on CT.  Planning to follow-up with pulmonology.  Will get PFTs.  She is able to take Mucinex  4. History of kidney transplant  Prior renal transplant in 2003 continues to follow-up every 6 weeks with nephrology.  Recent tacrolimus levels have been normal.  She remains on nadolol 40 mg daily for portal hypertension and varices  Most recent CT did show evidence of cirrhosis  5. Paroxysmal SVT (supraventricular tachycardia)  -     dilTIAZem CD (Cardizem CD) 180 MG 24 hr capsule; Take 1 capsule by mouth 2 (Two) Times a Day.  Dispense: 60 capsule; Refill: 2  History of prior ablation see above        Follow Up:  Return in about 6 months (around 6/26/2024) for  -Routine.  Patient was given instructions and counseling regarding her condition or for health maintenance advice. Please contact office if worsening symptoms or proceed to ER when appropriate.      David Damon PA-C  12/26/23  14:23 EST    MEDICATIONS         Discharge Medications            Accurate as of December 26, 2023  2:23 PM. If you have any questions, ask your nurse or doctor.                Changes to Medications        Instructions Start Date   dilTIAZem  MG 24 hr capsule  Commonly known as: Cardizem CD  What changed: when to take this  Changed by: David Damon PA-C   180 mg, Oral, 2 Times Daily             Continue These Medications        Instructions Start Date   multivitamin with minerals tablet tablet   1 tablet, Oral, Daily      mycophenolate 250 MG capsule  Commonly known as: CELLCEPT   250 mg, Oral, Every Morning      nadolol 20 MG tablet  Commonly known as: CORGARD   40 mg, Oral, Daily      tacrolimus 1 MG capsule  Commonly known as: PROGRAF   1 mg, Oral, 2 Times Daily                   **Dragon Disclaimer: This note was dictated using an electronic transcription. The electronic translation of spoken language may permit erroneous, or at times, nonsensical words or phrases to be inadvertently transcribed. Although I have reviewed the note for such errors, some may still exist.

## 2024-01-08 ENCOUNTER — TELEPHONE (OUTPATIENT)
Dept: CARDIOLOGY | Facility: CLINIC | Age: 69
End: 2024-01-08
Payer: COMMERCIAL

## 2024-01-08 NOTE — TELEPHONE ENCOUNTER
----- Message from David Garcia PA-C sent at 12/26/2023  2:18 PM EST -----  Call in 2 weeks to check on palpitations with SVT, increased cardizem to BID

## 2024-01-08 NOTE — TELEPHONE ENCOUNTER
Called patient to discuss over the phone.  Palpitations have significantly improved since taking diltiazem 180 mg twice daily.  She will continue taking it this way.  She is awaiting evaluation from pulmonology as she still has daytime fatigue

## 2024-01-09 ENCOUNTER — HOSPITAL ENCOUNTER (OUTPATIENT)
Dept: RESPIRATORY THERAPY | Facility: HOSPITAL | Age: 69
Discharge: HOME | End: 2024-01-09
Admitting: PHYSICIAN ASSISTANT
Payer: COMMERCIAL

## 2024-01-09 DIAGNOSIS — R06.09 DOE (DYSPNEA ON EXERTION): ICD-10-CM

## 2024-01-09 DIAGNOSIS — R91.8 ABNORMAL CT LUNG SCREENING: ICD-10-CM

## 2024-01-09 LAB
BDY SITE: ABNORMAL
CALCULATED HEMOGLOBIN, EPOC: ABNORMAL
HGB BLDA-MCNC: 11.4 G/DL (ref 12–18)
Lab: ABNORMAL

## 2024-01-09 PROCEDURE — 94726 PLETHYSMOGRAPHY LUNG VOLUMES: CPT

## 2024-01-09 PROCEDURE — 82820 HEMOGLOBIN-OXYGEN AFFINITY: CPT | Performed by: PHYSICIAN ASSISTANT

## 2024-01-09 PROCEDURE — 94060 EVALUATION OF WHEEZING: CPT

## 2024-01-09 PROCEDURE — 94729 DIFFUSING CAPACITY: CPT

## 2024-01-09 RX ORDER — ALBUTEROL SULFATE 2.5 MG/3ML
2.5 SOLUTION RESPIRATORY (INHALATION) ONCE AS NEEDED
Status: COMPLETED | OUTPATIENT
Start: 2024-01-09 | End: 2024-01-09

## 2024-01-09 RX ADMIN — ALBUTEROL SULFATE 2.5 MG: 2.5 SOLUTION RESPIRATORY (INHALATION) at 12:16

## 2024-01-11 NOTE — PROGRESS NOTES
Please call the patient and let her know that her pulmonary function test did show moderate obstructive lung disease.  I am unsure how this compares to previous pulmonary function test.  She has a follow-up set up with pulmonology already and I will make sure to forward them these results.  Thanks!

## 2024-02-07 ENCOUNTER — TRANSCRIBE ORDERS (OUTPATIENT)
Dept: ADMINISTRATIVE | Facility: HOSPITAL | Age: 69
End: 2024-02-07
Payer: COMMERCIAL

## 2024-02-07 DIAGNOSIS — R91.8 LUNG NODULES: Primary | ICD-10-CM

## 2024-03-28 DIAGNOSIS — I47.10 PAROXYSMAL SVT (SUPRAVENTRICULAR TACHYCARDIA): ICD-10-CM

## 2024-03-28 RX ORDER — DILTIAZEM HYDROCHLORIDE 180 MG/1
CAPSULE, COATED, EXTENDED RELEASE ORAL
Qty: 60 CAPSULE | Refills: 2 | Status: SHIPPED | OUTPATIENT
Start: 2024-03-28

## 2024-04-02 ENCOUNTER — HOSPITAL ENCOUNTER (OUTPATIENT)
Dept: CT IMAGING | Facility: HOSPITAL | Age: 69
Discharge: HOME | End: 2024-04-02
Admitting: INTERNAL MEDICINE
Payer: COMMERCIAL

## 2024-04-02 DIAGNOSIS — R91.8 LUNG NODULES: ICD-10-CM

## 2024-04-02 PROCEDURE — 71250 CT THORAX DX C-: CPT

## 2024-04-12 ENCOUNTER — TRANSCRIBE ORDERS (OUTPATIENT)
Dept: ADMINISTRATIVE | Facility: HOSPITAL | Age: 69
End: 2024-04-12
Payer: COMMERCIAL

## 2024-04-12 DIAGNOSIS — J47.9 BRONCHIECTASIS WITHOUT COMPLICATION: Primary | ICD-10-CM

## 2024-04-12 DIAGNOSIS — R91.8 MULTIPLE LUNG NODULES: ICD-10-CM

## 2024-05-06 ENCOUNTER — TELEPHONE (OUTPATIENT)
Dept: CARDIOLOGY | Facility: CLINIC | Age: 69
End: 2024-05-06
Payer: COMMERCIAL

## 2024-06-19 ENCOUNTER — OFFICE (OUTPATIENT)
Dept: URBAN - METROPOLITAN AREA CLINIC 76 | Facility: CLINIC | Age: 69
End: 2024-06-19

## 2024-06-19 VITALS
RESPIRATION RATE: 20 BRPM | DIASTOLIC BLOOD PRESSURE: 72 MMHG | HEART RATE: 83 BPM | HEIGHT: 71 IN | SYSTOLIC BLOOD PRESSURE: 125 MMHG | WEIGHT: 173 LBS

## 2024-06-19 DIAGNOSIS — R13.10 DYSPHAGIA, UNSPECIFIED: ICD-10-CM

## 2024-06-19 DIAGNOSIS — I85.00 ESOPHAGEAL VARICES WITHOUT BLEEDING: ICD-10-CM

## 2024-06-19 DIAGNOSIS — K58.9 IRRITABLE BOWEL SYNDROME WITHOUT DIARRHEA: ICD-10-CM

## 2024-06-19 DIAGNOSIS — K74.60 UNSPECIFIED CIRRHOSIS OF LIVER: ICD-10-CM

## 2024-06-19 PROCEDURE — 99214 OFFICE O/P EST MOD 30 MIN: CPT | Performed by: INTERNAL MEDICINE

## 2024-06-27 ENCOUNTER — OFFICE VISIT (OUTPATIENT)
Dept: CARDIOLOGY | Facility: CLINIC | Age: 69
End: 2024-06-27
Payer: COMMERCIAL

## 2024-06-27 VITALS
HEIGHT: 71 IN | HEART RATE: 72 BPM | DIASTOLIC BLOOD PRESSURE: 82 MMHG | SYSTOLIC BLOOD PRESSURE: 122 MMHG | BODY MASS INDEX: 24.36 KG/M2 | WEIGHT: 174 LBS | OXYGEN SATURATION: 94 %

## 2024-06-27 DIAGNOSIS — R60.0 LOWER EXTREMITY EDEMA: ICD-10-CM

## 2024-06-27 DIAGNOSIS — Z94.0 HISTORY OF KIDNEY TRANSPLANT: ICD-10-CM

## 2024-06-27 DIAGNOSIS — K74.60 CIRRHOSIS OF LIVER WITHOUT ASCITES, UNSPECIFIED HEPATIC CIRRHOSIS TYPE: ICD-10-CM

## 2024-06-27 DIAGNOSIS — I47.19 ATRIAL TACHYCARDIA: Primary | ICD-10-CM

## 2024-06-27 RX ORDER — GUAIFENESIN 600 MG/1
1200 TABLET, EXTENDED RELEASE ORAL 2 TIMES DAILY
COMMUNITY

## 2024-06-27 RX ORDER — MAGNESIUM 200 MG
1 TABLET ORAL DAILY
COMMUNITY

## 2024-06-27 RX ORDER — PANTOPRAZOLE SODIUM 40 MG/1
40 TABLET, DELAYED RELEASE ORAL DAILY
COMMUNITY

## 2024-06-27 NOTE — PROGRESS NOTES
[facility]    Subjective:     Encounter Date:06/27/24      Patient ID: Jacey Valdez is a 69 y.o. female.    Chief Complaint:   No chief complaint on file.  Follow-up palpitations  History of Present Illness    Ms. 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  She also recently has been evaluated with CT scans which revealed portal hypertension and cirrhosis, with varices and follows with [facility]..     She does have a history of AVNRT, both typical and atypical, and underwent ablation on June 2, 2014 with Dr. Adhikari at [place], where she underwent a catheter ablation of the slow pathway.    At last visit, she started to have recurrent palpitations, and ambulatory monitoring revealed frequent supraventricular ectopy at 10% with multiple atrial runs, longest episode lasting 12.8 seconds.  She started on diltiazem, and she is not sure if it helped the palpitation episodes or not.  It definitely did not help her dyspnea, she follows with Dr. Tay for pneumonitis.    She is noticed some worsening swelling of her legs recently.  But otherwise her symptoms are status quo.    Previous card testing:  Echocardiogram October 23:    : Left ventricular systolic function is normal. Left ventricular ejection fraction appears to be 66 - 70%. Normal global longitudinal LV strain (GLS) = -21.4%. Left ventricle strain data was reviewed by the physician and found to be accurate. Normal left ventricular cavity size noted. Left ventricular wall thickness is consistent with mild concentric hypertrophy. All left ventricular wall segments contract normally. Left ventricular diastolic function was normal.    The low ventricular myocardium appears speckled, but this may be due to high gain on the study in  general. There is normal left atrial volume, normal diastolic function, and normal global longitudinal strain, which makes infiltration less likely.    Estimated right ventricular systolic pressure from tricuspid regurgitation is normal (<35 mmHg).    Holter monitor October 23:      An abnormal monitor study.    7-day Holter monitor predominant rhythm sinus heart ranging 54 to 154 bpm (average 73 bpm). Frequent supraventricular ectopy (10.1%) with 64 runs of nonsustained SVT the longest of which was 12.8 seconds at 115 bpm.   Rare ventricular ectopy with no NSVT. No atrial fibrillation or flutter was noted. There were 34 patient triggered events and 10 diary events with the predominant symptom being palpitations.  All correlated with isolated supraventricular ectopy and 1 or 2 runs of nonsustained SVT.       Echocardiogram April 2014:  Mild concentric LVH  LVEF 59%  Grade 1 diastolic dysfunction  Mildly enlarged left atrium    Pharmacologic stress test Lexiscan Cardiolite April 2014:  1. Normal IV Lexiscan myocardial perfusion study without evidence of   ischemia or infarction.   2. Normal systolic function with normal ejection fraction of 69%.       The following portions of the patient's history were reviewed and updated as appropriate: allergies, current medications, past family history, past medical history, past social history, past surgical history and problem list.    Past Medical History:   Diagnosis Date    Acute cystitis without hematuria 12/27/2022    Arrhythmia 2022    Asthma 2022    Chronic kidney disease 2001    I had pkd, received kidney in 2005    Kidney transplanted     Right upper quadrant abdominal pain 12/27/2022    Tachycardia     Had ablation       Past Surgical History:   Procedure Laterality Date    ABLATION OF DYSRHYTHMIC FOCUS  2003    Had tachycardia    ENDOSCOPY N/A 12/29/2022    Procedure: ESOPHAGOGASTRODUODENOSCOPY with biopsies;  Surgeon: Kristin Thao MD;  [place] [facility] [place];  Service: Gastroenterology;  Laterality: N/A;  Pre: abnormal CT scan  Post: esophageal varacies, portal hypertensive gastropathy, gastritis    PARATHYROIDECTOMY      \"All removed but 1/2 of one\"    TRANSPLANTATION RENAL           Procedures       Objective:     Vitals:    06/27/24 1331   BP: 122/82   Pulse: 72   SpO2: 94%   Weight: 78.9 kg (174 lb)   Height: 180.3 cm (71\")         Constitutional:       Appearance: Healthy appearance. Not in distress.   Neck:      Vascular: JVD normal.   Pulmonary:      Effort: Pulmonary effort is normal.      Breath sounds: Normal breath sounds.   Cardiovascular:      PMI at left midclavicular line. Normal rate. Regular rhythm. Normal S2.       Murmurs: There is a grade 2/6 mid frequency blowing holosystolic murmur at the LLSB.   Pulses:     Intact distal pulses.   Edema:     Peripheral edema present.     Pretibial: bilateral 2+ edema of the pretibial area.     Ankle: bilateral 2+ edema of the ankle.  Musculoskeletal:      Comments: Clubbing noted to right upper extremity only Skin:     General: Skin is warm and dry.   Neurological:      General: No focal deficit present.      Mental Status: Alert, oriented to person, place, and time and oriented to person, place and time.   Psychiatric:         Mood and Affect: Mood and affect normal.         Lab Review:       BUN   Date Value Ref Range Status   12/31/2022 21 8 - 23 mg/dL Final   06/24/2022 14 10 - 20 mg/dL Final     Creatinine   Date Value Ref Range Status   03/30/2023 1.00 0.60 - 1.30 mg/dL Final     Comment:     Serial Number: [id number]:  [id number]   06/24/2022 0.77 0.55 - 1.02 mg/dL Final     Potassium   Date Value Ref Range Status   12/31/2022 3.9 3.5 - 5.2 mmol/L Final   06/24/2022 4.2 3.5 - 5.1 mmol/L Final     ALT (SGPT)   Date Value Ref Range Status   12/31/2022 19 1 - 33 U/L Final   06/24/2022 18 0 - 55 U/L Final     AST (SGOT)   Date Value Ref Range Status   12/31/2022 27 1 - 32 U/L Final   06/24/2022 26 5 - " 34 U/L Final         Performed        Assessment:          Diagnosis Plan   1. Atrial tachycardia        2. Lower extremity edema  Adult Transthoracic Echo Complete w/ Color, Spectral and Contrast if necessary per protocol      3. Cirrhosis of liver without ascites, unspecified hepatic cirrhosis type  Adult Transthoracic Echo Complete w/ Color, Spectral and Contrast if necessary per protocol      4. History of kidney transplant                 Plan:         Dyspnea on exertion: Suggestive of atypical pneumonitis and she follows with pulmonary.  She is on bronchodilators.    Echocardiogram was unremarkable for CHF, and there is no evidence of pulm hypertension.    Plan to repeat an echo 1 year from the previous echo in October, evaluate for any pulmonary hypertension or portal pulmonary hypertension  Palpitations, history of AVNRT status post ablation: Some nonsustained SVT episodes.  She was started on diltiazem, and she is not sure if she feels any better or worse, but her swelling of her legs is slightly worse.    She remains on nadolol for variceal prophylaxis     I think we can try without the diltiazem for a week or so and see how her symptoms do.  If her edema gets better, then I think she can stay off of it, but if she has worsening palpitations she may likely need to remain on it.  Cirrhosis: Follows with GI.   She has grade 2 esophageal varices.  Nadolol.   Lower extremity edema.  Echo was unremarkable per above.  Probably related to cirrhosis.  Will defer to her nephrologist or GI team whether that they would initiate Lasix and spironolactone.       RTC in October with repeat echocardiogram.  Were going to try without her diltiazem for a few days and see how she feels and see if the edema gets better.  She does have intermittent atrial ectopy which could be suppressed with diltiazem, but does not require by any means I do not think it is contributing to any significant pathology outside of intermittent  palpitations.    Bret Cason MD  [facility]  06/27/24  12:53 EDT       Current Outpatient Medications:     Acetaminophen (TYLENOL PO), Take 1 tablet by mouth As Needed., Disp: , Rfl:     guaiFENesin (MUCINEX) 600 MG 12 hr tablet, Take 2 tablets by mouth 2 (Two) Times a Day., Disp: , Rfl:     Magnesium 200 MG tablet, Take 1 tablet by mouth Daily., Disp: , Rfl:     multivitamin with minerals tablet tablet, Take 1 tablet by mouth Daily., Disp: , Rfl:     mycophenolate (CELLCEPT) 250 MG capsule, Take 1 capsule by mouth Every Morning., Disp: , Rfl:     nadolol (CORGARD) 20 MG tablet, Take 2 tablets by mouth Daily., Disp: , Rfl:     pantoprazole (PROTONIX) 40 MG EC tablet, Take 1 tablet by mouth Daily., Disp: , Rfl:     tacrolimus (PROGRAF) 1 MG capsule, Take 1 capsule by mouth 2 (Two) Times a Day., Disp: , Rfl:          Return in about 21 weeks (around 11/21/2024).      Part of this note may be an electronic transcription/translation of spoken language to printed text using the Dragon Dictation System.

## 2024-06-27 NOTE — PATIENT INSTRUCTIONS
The swelling in your legs may be due to multiple things, but most commonly due to cirrhosis of the liver.    I do not think it is due to heart failure thankfully.    It could also be due to the medication called diltiazem.  Since you are already on a drug called nadolol, I think it is safe for you to stop the diltiazem medicine as this can cause some swelling of the legs.  I think you can stop it for about a week or so, by that time, you might start to notice a difference of your swelling.  Let us know via [employer] message which you find.  If you notice a big difference in your swelling gets better, then you can just stay off the diltiazem.  If your swelling stays the same, then it is not the diltiazem causing that problem, and I would recommend you restart it.  In the meantime, try to raise your legs at night, if the swelling really gets bad I would reach out to your nephrologist.

## 2024-07-24 PROBLEM — I85.00: Status: ACTIVE | Noted: 2017-10-31

## 2024-08-07 ENCOUNTER — ON CAMPUS - OUTPATIENT (OUTPATIENT)
Dept: URBAN - METROPOLITAN AREA HOSPITAL 108 | Facility: HOSPITAL | Age: 69
End: 2024-08-07
Payer: COMMERCIAL

## 2024-08-07 DIAGNOSIS — I85.00 ESOPHAGEAL VARICES WITHOUT BLEEDING: ICD-10-CM

## 2024-08-07 DIAGNOSIS — K22.89 OTHER SPECIFIED DISEASE OF ESOPHAGUS: ICD-10-CM

## 2024-08-07 DIAGNOSIS — K74.60 UNSPECIFIED CIRRHOSIS OF LIVER: ICD-10-CM

## 2024-08-07 DIAGNOSIS — K31.7 POLYP OF STOMACH AND DUODENUM: ICD-10-CM

## 2024-08-07 PROCEDURE — 43235 EGD DIAGNOSTIC BRUSH WASH: CPT | Performed by: INTERNAL MEDICINE

## 2024-12-04 ENCOUNTER — TELEPHONE (OUTPATIENT)
Dept: CARDIOLOGY | Facility: CLINIC | Age: 69
End: 2024-12-04
Payer: COMMERCIAL

## 2024-12-05 ENCOUNTER — HOSPITAL ENCOUNTER (OUTPATIENT)
Dept: CARDIOLOGY | Facility: HOSPITAL | Age: 69
Discharge: HOME | End: 2024-12-05
Admitting: STUDENT IN AN ORGANIZED HEALTH CARE EDUCATION/TRAINING PROGRAM
Payer: COMMERCIAL

## 2024-12-05 ENCOUNTER — OFFICE VISIT (OUTPATIENT)
Dept: CARDIOLOGY | Facility: CLINIC | Age: 69
End: 2024-12-05
Payer: COMMERCIAL

## 2024-12-05 VITALS
HEART RATE: 66 BPM | WEIGHT: 174 LBS | DIASTOLIC BLOOD PRESSURE: 85 MMHG | BODY MASS INDEX: 24.36 KG/M2 | SYSTOLIC BLOOD PRESSURE: 145 MMHG | HEIGHT: 71 IN

## 2024-12-05 VITALS
HEART RATE: 66 BPM | DIASTOLIC BLOOD PRESSURE: 82 MMHG | OXYGEN SATURATION: 96 % | SYSTOLIC BLOOD PRESSURE: 122 MMHG | HEIGHT: 71 IN | BODY MASS INDEX: 23.41 KG/M2 | WEIGHT: 167.2 LBS

## 2024-12-05 DIAGNOSIS — I51.7 LVH (LEFT VENTRICULAR HYPERTROPHY): ICD-10-CM

## 2024-12-05 DIAGNOSIS — R60.0 LOWER EXTREMITY EDEMA: ICD-10-CM

## 2024-12-05 DIAGNOSIS — I51.89 DIASTOLIC DYSFUNCTION: ICD-10-CM

## 2024-12-05 DIAGNOSIS — R19.7 DIARRHEA, UNSPECIFIED TYPE: ICD-10-CM

## 2024-12-05 DIAGNOSIS — R00.2 PALPITATIONS: Primary | ICD-10-CM

## 2024-12-05 DIAGNOSIS — Z94.0 HISTORY OF KIDNEY TRANSPLANT: ICD-10-CM

## 2024-12-05 DIAGNOSIS — K74.60 CIRRHOSIS OF LIVER WITHOUT ASCITES, UNSPECIFIED HEPATIC CIRRHOSIS TYPE: ICD-10-CM

## 2024-12-05 DIAGNOSIS — I47.10 PAROXYSMAL SVT (SUPRAVENTRICULAR TACHYCARDIA): ICD-10-CM

## 2024-12-05 LAB
AORTIC ARCH: 2.6 CM
ASCENDING AORTA: 3.6 CM
BH CV ECHO LEFT VENTRICLE GLOBAL LONGITUDINAL STRAIN: -20.3 %
BH CV ECHO MEAS - ACS: 2.34 CM
BH CV ECHO MEAS - AO MAX PG: 4.8 MMHG
BH CV ECHO MEAS - AO MEAN PG: 3 MMHG
BH CV ECHO MEAS - AO ROOT DIAM: 3.5 CM
BH CV ECHO MEAS - AO V2 MAX: 110 CM/SEC
BH CV ECHO MEAS - AO V2 VTI: 25.4 CM
BH CV ECHO MEAS - AVA(I,D): 3.1 CM2
BH CV ECHO MEAS - EDV(CUBED): 91.1 ML
BH CV ECHO MEAS - EDV(MOD-SP2): 97 ML
BH CV ECHO MEAS - EDV(MOD-SP4): 97 ML
BH CV ECHO MEAS - EF(MOD-BP): 62.4 %
BH CV ECHO MEAS - EF(MOD-SP2): 56.7 %
BH CV ECHO MEAS - EF(MOD-SP4): 64.9 %
BH CV ECHO MEAS - ESV(CUBED): 20.5 ML
BH CV ECHO MEAS - ESV(MOD-SP2): 42 ML
BH CV ECHO MEAS - ESV(MOD-SP4): 34 ML
BH CV ECHO MEAS - FS: 39.1 %
BH CV ECHO MEAS - IVS/LVPW: 1.14 CM
BH CV ECHO MEAS - IVSD: 1.6 CM
BH CV ECHO MEAS - LAT PEAK E' VEL: 8.4 CM/SEC
BH CV ECHO MEAS - LV DIASTOLIC VOL/BSA (35-75): 48.8 CM2
BH CV ECHO MEAS - LV MASS(C)D: 275.8 GRAMS
BH CV ECHO MEAS - LV MAX PG: 3.8 MMHG
BH CV ECHO MEAS - LV MEAN PG: 2 MMHG
BH CV ECHO MEAS - LV SYSTOLIC VOL/BSA (12-30): 17.1 CM2
BH CV ECHO MEAS - LV V1 MAX: 98 CM/SEC
BH CV ECHO MEAS - LV V1 VTI: 22.5 CM
BH CV ECHO MEAS - LVIDD: 4.5 CM
BH CV ECHO MEAS - LVIDS: 2.7 CM
BH CV ECHO MEAS - LVOT AREA: 3.5 CM2
BH CV ECHO MEAS - LVOT DIAM: 2.11 CM
BH CV ECHO MEAS - LVPWD: 1.4 CM
BH CV ECHO MEAS - MED PEAK E' VEL: 5.8 CM/SEC
BH CV ECHO MEAS - MR MAX PG: 99.9 MMHG
BH CV ECHO MEAS - MR MAX VEL: 499.8 CM/SEC
BH CV ECHO MEAS - MV A DUR: 0.12 SEC
BH CV ECHO MEAS - MV A MAX VEL: 72.8 CM/SEC
BH CV ECHO MEAS - MV DEC SLOPE: 278.5 CM/SEC2
BH CV ECHO MEAS - MV DEC TIME: 0.28 SEC
BH CV ECHO MEAS - MV E MAX VEL: 59.7 CM/SEC
BH CV ECHO MEAS - MV E/A: 0.82
BH CV ECHO MEAS - MV MAX PG: 2.8 MMHG
BH CV ECHO MEAS - MV MEAN PG: 1.2 MMHG
BH CV ECHO MEAS - MV P1/2T: 78.1 MSEC
BH CV ECHO MEAS - MV V2 VTI: 22.8 CM
BH CV ECHO MEAS - MVA(P1/2T): 2.8 CM2
BH CV ECHO MEAS - MVA(VTI): 3.5 CM2
BH CV ECHO MEAS - PA ACC TIME: 0.16 SEC
BH CV ECHO MEAS - PA V2 MAX: 82.5 CM/SEC
BH CV ECHO MEAS - PULM A REVS DUR: 0.1 SEC
BH CV ECHO MEAS - PULM A REVS VEL: 27.8 CM/SEC
BH CV ECHO MEAS - PULM DIAS VEL: 53.5 CM/SEC
BH CV ECHO MEAS - PULM S/D: 0.85
BH CV ECHO MEAS - PULM SYS VEL: 45.6 CM/SEC
BH CV ECHO MEAS - QP/QS: 0.76
BH CV ECHO MEAS - RAP SYSTOLE: 8 MMHG
BH CV ECHO MEAS - RV MAX PG: 1.98 MMHG
BH CV ECHO MEAS - RV V1 MAX: 70.3 CM/SEC
BH CV ECHO MEAS - RV V1 VTI: 16.9 CM
BH CV ECHO MEAS - RVOT DIAM: 2.12 CM
BH CV ECHO MEAS - RVSP: 25 MMHG
BH CV ECHO MEAS - SUP REN AO DIAM: 1.8 CM
BH CV ECHO MEAS - SV(LVOT): 78.9 ML
BH CV ECHO MEAS - SV(MOD-SP2): 55 ML
BH CV ECHO MEAS - SV(MOD-SP4): 63 ML
BH CV ECHO MEAS - SV(RVOT): 59.8 ML
BH CV ECHO MEAS - SVI(LVOT): 39.7 ML/M2
BH CV ECHO MEAS - SVI(MOD-SP2): 27.7 ML/M2
BH CV ECHO MEAS - SVI(MOD-SP4): 31.7 ML/M2
BH CV ECHO MEAS - TAPSE (>1.6): 2.6 CM
BH CV ECHO MEAS - TR MAX PG: 16.9 MMHG
BH CV ECHO MEAS - TR MAX VEL: 205.3 CM/SEC
BH CV ECHO MEASUREMENTS AVERAGE E/E' RATIO: 8.41
BH CV XLRA - RV BASE: 3.5 CM
BH CV XLRA - RV LENGTH: 6.9 CM
BH CV XLRA - RV MID: 3.4 CM
BH CV XLRA - TDI S': 18.9 CM/SEC
LEFT ATRIUM VOLUME INDEX: 46.4 ML/M2
SINUS: 3.7 CM
STJ: 2.9 CM

## 2024-12-05 PROCEDURE — 99214 OFFICE O/P EST MOD 30 MIN: CPT | Performed by: STUDENT IN AN ORGANIZED HEALTH CARE EDUCATION/TRAINING PROGRAM

## 2024-12-05 PROCEDURE — 93306 TTE W/DOPPLER COMPLETE: CPT

## 2024-12-05 PROCEDURE — 93356 MYOCRD STRAIN IMG SPCKL TRCK: CPT

## 2024-12-05 RX ORDER — PREDNISONE 5 MG/1
1 TABLET ORAL DAILY
COMMUNITY
Start: 2024-10-29 | End: 2025-10-29

## 2024-12-05 RX ORDER — SODIUM CHLORIDE FOR INHALATION 7 %
VIAL, NEBULIZER (ML) INHALATION
COMMUNITY
Start: 2024-10-30

## 2024-12-05 RX ORDER — DIPHENOXYLATE HYDROCHLORIDE AND ATROPINE SULFATE 2.5; .025 MG/1; MG/1
1 TABLET ORAL DAILY
COMMUNITY

## 2024-12-05 RX ORDER — ALBUTEROL SULFATE 0.83 MG/ML
SOLUTION RESPIRATORY (INHALATION)
COMMUNITY
Start: 2024-10-29

## 2024-12-05 NOTE — PROGRESS NOTES
[facility]    Subjective:     Encounter Date:12/05/24      Patient ID: Jcaey Valdez is a 69 y.o. female.    Chief Complaint:   Chief Complaint   Patient presents with    Atrial tachycardia   Follow-up palpitations  History of Present Illness    Ms. Valdez is a pleasant 69 y.o. 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  She also recently has been evaluated with CT scans which revealed portal hypertension and cirrhosis, with varices and follows with [facility].    She does have a history of AVNRT, both typical and atypical, and underwent ablation on June 2, 2014 with Dr. Adhikari at [place], where she underwent a catheter ablation of the slow pathway.    At last visit, she started to have recurrent palpitations, and ambulatory monitoring revealed frequent supraventricular ectopy at 10% with multiple atrial runs, longest episode lasting 12.8 seconds.  She started on diltiazem.  She had some subsequent lower extremity edema and the palpitations were not particularly better or worse.  It was stopped.  It definitely did not help her dyspnea, she follows with Dr. Tay for pneumonitis.    She is noticed some worsening swelling of her legs recently.  It started after diltiazem, it has improved have her since discontinuation at last visit.  Her nadolol was increased to 40 mg.    Previous card testing:  Echocardiogram October 23:    : Left ventricular systolic function is normal. Left ventricular ejection fraction appears to be 66 - 70%. Normal global longitudinal LV strain (GLS) = -21.4%. Left ventricle strain data was reviewed by the physician and found to be accurate. Normal left ventricular cavity size noted. Left ventricular wall thickness is consistent with mild concentric hypertrophy. All left ventricular wall  segments contract normally. Left ventricular diastolic function was normal.    The low ventricular myocardium appears speckled, but this may be due to high gain on the study in general. There is normal left atrial volume, normal diastolic function, and normal global longitudinal strain, which makes infiltration less likely.    Estimated right ventricular systolic pressure from tricuspid regurgitation is normal (<35 mmHg).    Holter monitor October 23:      An abnormal monitor study.    7-day Holter monitor predominant rhythm sinus heart ranging 54 to 154 bpm (average 73 bpm). Frequent supraventricular ectopy (10.1%) with 64 runs of nonsustained SVT the longest of which was 12.8 seconds at 115 bpm.   Rare ventricular ectopy with no NSVT. No atrial fibrillation or flutter was noted. There were 34 patient triggered events and 10 diary events with the predominant symptom being palpitations.  All correlated with isolated supraventricular ectopy and 1 or 2 runs of nonsustained SVT.       Echocardiogram April 2014:  Mild concentric LVH  LVEF 59%  Grade 1 diastolic dysfunction  Mildly enlarged left atrium    Pharmacologic stress test Lexiscan Cardiolite April 2014:  1. Normal IV Lexiscan myocardial perfusion study without evidence of   ischemia or infarction.   2. Normal systolic function with normal ejection fraction of 69%.     Echocardiogram performed December 5, 2024:    Left ventricular systolic function is normal. Calculated left ventricular EF = 62.4% Left ventricular ejection fraction appears to be 61 - 65%.    Left ventricular wall thickness is consistent with moderate concentric hypertrophy.    Left ventricular diastolic function is consistent with (grade I) impaired relaxation.    The right ventricular cavity is borderline dilated.    The left atrial cavity is moderate to severely dilated.    Estimated right ventricular systolic pressure from tricuspid regurgitation is normal (<35 mmHg). Calculated right  "ventricular systolic pressure from tricuspid regurgitation is 25 mmHg.    The aortic root measures 3.5 cm.         The following portions of the patient's history were reviewed and updated as appropriate: allergies, current medications, past family history, past medical history, past social history, past surgical history and problem list.    Past Medical History:   Diagnosis Date    Acute cystitis without hematuria 12/27/2022    Arrhythmia 2022    Asthma 2022    Chronic kidney disease 2001    I had pkd, received kidney in 2005    Kidney transplanted     Right upper quadrant abdominal pain 12/27/2022    Tachycardia     Had ablation       Past Surgical History:   Procedure Laterality Date    ABLATION OF DYSRHYTHMIC FOCUS  2003    Had tachycardia    ENDOSCOPY N/A 12/29/2022    Procedure: ESOPHAGOGASTRODUODENOSCOPY with biopsies;  Surgeon: Kristin Thao MD;  [facility] END[facility]COPY;  Service: Gastroenterology;  Laterality: N/A;  Pre: abnormal CT scan  Post: esophageal varacies, portal hypertensive gastropathy, gastritis    PARATHYROIDECTOMY      \"All removed but 1/2 of one\"    TRANSPLANTATION RENAL           Procedures       Objective:     Vitals:    12/05/24 1438   BP: 122/82   BP Location: Right arm   Patient Position: Sitting   Pulse: 66   SpO2: 96%   Weight: 75.8 kg (167 lb 3.2 oz)   Height: 180.3 cm (71\")         Constitutional:       Appearance: Healthy appearance. Not in distress.   Neck:      Vascular: JVD normal.   Pulmonary:      Effort: Pulmonary effort is normal.      Breath sounds: Normal breath sounds.   Cardiovascular:      PMI at left midclavicular line. Normal rate. Regular rhythm. Normal S2.       Murmurs: There is a grade 2/6 mid frequency blowing holosystolic murmur at the LLSB.      Comments: Edema improved on today's exam  Pulses:     Intact distal pulses.   Edema:     Peripheral edema present.     Pretibial: bilateral trace edema of the pretibial area.     Ankle: bilateral trace edema of " the ankle.  Musculoskeletal:      Comments: Clubbing noted to right upper extremity only Skin:     General: Skin is warm and dry.   Neurological:      General: No focal deficit present.      Mental Status: Alert, oriented to person, place, and time and oriented to person, place and time.   Psychiatric:         Mood and Affect: Mood and affect normal.         Lab Review:     Lipid Panel          9/18/2024    09:30   Lipid Panel   Total Cholesterol 127       Triglycerides 61       HDL Cholesterol 61       LDL Cholesterol  53          Details          This result is from an external source.             BUN   Date Value Ref Range Status   12/31/2022 21 8 - 23 mg/dL Final   06/24/2022 14 10 - 20 mg/dL Final     Creatinine   Date Value Ref Range Status   03/30/2023 1.00 0.60 - 1.30 mg/dL Final     Comment:     Serial Number: [id number]:  [id number]   06/24/2022 0.77 0.55 - 1.02 mg/dL Final     Potassium   Date Value Ref Range Status   12/31/2022 3.9 3.5 - 5.2 mmol/L Final   06/24/2022 4.2 3.5 - 5.1 mmol/L Final     ALT (SGPT)   Date Value Ref Range Status   12/31/2022 19 1 - 33 U/L Final   06/24/2022 18 0 - 55 U/L Final     AST (SGOT)   Date Value Ref Range Status   12/31/2022 27 1 - 32 U/L Final   06/24/2022 26 5 - 34 U/L Final         Performed        Assessment:          Diagnosis Plan   1. Palpitations        2. Paroxysmal SVT (supraventricular tachycardia)        3. Diarrhea, unspecified type        4. Diastolic dysfunction        5. LVH (left ventricular hypertrophy)        6. Cirrhosis of liver without ascites, unspecified hepatic cirrhosis type        7. History of kidney transplant                   Plan:         Dyspnea on exertion: Suggestive of atypical pneumonitis and she follows with pulmonary.  She is on bronchodilators.    Echocardiogram was unremarkable for CHF, and there is no evidence of pulm hypertension.     Repeat echo was performed today, GLS is normal.  There is a mildly speckled appearance to the  myocardium but no definitive features to suggest amyloid.  Will follow her clinically.  May need PYP scan versus amyloid workup if symptoms worsen  Palpitations, history of AVNRT status post ablation: Some nonsustained SVT episodes.  She was started on diltiazem, however she did not feel any better, and leg swellings worsen.  This was discontinued.    Palpitations well-managed on nadolol 40 daily.    Low threshold for EP evaluation should palpitations recur or worsen    Cirrhosis: Follows with GI.   She has grade 2 esophageal varices.  Remains on nadolol  History of renal transplant.  She follows with Dr. Estrada.  Recent diarrhea.  She mentions to me today, I do not see need for any cardiac medications.  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  If her diarrhea persists she may benefit from a further workup of the diarrhea with PCP or her transplant nephrologist.     RTC 6 months.    Bret Cason MD  [facility]  12/05/24  12:53 EDT       Current Outpatient Medications:     Acetaminophen (TYLENOL PO), Take 1 tablet by mouth As Needed., Disp: , Rfl:     albuterol (PROVENTIL) (2.5 MG/3ML) 0.083% nebulizer solution, , Disp: , Rfl:     guaiFENesin (MUCINEX) 600 MG 12 hr tablet, Take 2 tablets by mouth 2 (Two) Times a Day., Disp: , Rfl:     Magnesium 200 MG tablet, Take 1 tablet by mouth Daily., Disp: , Rfl:     multivitamin (MULTIPLE VITAMIN PO), Apply 1 tablet to the mouth or throat Daily., Disp: , Rfl:     multivitamin with minerals tablet tablet, Take 1 tablet by mouth Daily., Disp: , Rfl:     mycophenolate (CELLCEPT) 250 MG capsule, Take 1 capsule by mouth Every Morning., Disp: , Rfl:     nadolol (CORGARD) 20 MG tablet, Take 2 tablets by mouth Daily., Disp: , Rfl:     pantoprazole (PROTONIX) 40 MG EC tablet, Take 1 tablet by mouth Daily., Disp: , Rfl:     predniSONE (DELTASONE) 5 MG tablet, Take 1 tablet by mouth Daily., Disp: , Rfl:      sodium chloride 7 % nebulizer solution nebulizer solution, , Disp: , Rfl:     tacrolimus (PROGRAF) 1 MG capsule, Take 1 capsule by mouth 2 (Two) Times a Day., Disp: , Rfl:          Return in about 6 months (around 6/5/2025).      Part of this note may be an electronic transcription/translation of spoken language to printed text using the Dragon Dictation System.

## 2025-06-17 ENCOUNTER — OFFICE (OUTPATIENT)
Dept: URBAN - METROPOLITAN AREA CLINIC 76 | Facility: CLINIC | Age: 70
End: 2025-06-17
Payer: COMMERCIAL

## 2025-06-17 VITALS
SYSTOLIC BLOOD PRESSURE: 127 MMHG | HEART RATE: 71 BPM | DIASTOLIC BLOOD PRESSURE: 78 MMHG | WEIGHT: 175 LBS | HEIGHT: 71 IN | OXYGEN SATURATION: 99 %

## 2025-06-17 DIAGNOSIS — K29.70 GASTRITIS, UNSPECIFIED, WITHOUT BLEEDING: ICD-10-CM

## 2025-06-17 DIAGNOSIS — R13.10 DYSPHAGIA, UNSPECIFIED: ICD-10-CM

## 2025-06-17 DIAGNOSIS — K74.60 UNSPECIFIED CIRRHOSIS OF LIVER: ICD-10-CM

## 2025-06-17 DIAGNOSIS — R93.2 ABNORMAL FINDINGS ON DIAGNOSTIC IMAGING OF LIVER AND BILIARY: ICD-10-CM

## 2025-06-17 DIAGNOSIS — K64.8 OTHER HEMORRHOIDS: ICD-10-CM

## 2025-06-17 DIAGNOSIS — K57.30 DIVERTICULOSIS OF LARGE INTESTINE WITHOUT PERFORATION OR ABS: ICD-10-CM

## 2025-06-17 DIAGNOSIS — I85.00 ESOPHAGEAL VARICES WITHOUT BLEEDING: ICD-10-CM

## 2025-06-17 DIAGNOSIS — K58.9 IRRITABLE BOWEL SYNDROME, UNSPECIFIED: ICD-10-CM

## 2025-06-17 DIAGNOSIS — Z12.11 ENCOUNTER FOR SCREENING FOR MALIGNANT NEOPLASM OF COLON: ICD-10-CM

## 2025-06-17 DIAGNOSIS — K31.89 OTHER DISEASES OF STOMACH AND DUODENUM: ICD-10-CM

## 2025-06-17 PROCEDURE — 99214 OFFICE O/P EST MOD 30 MIN: CPT | Performed by: INTERNAL MEDICINE

## 2025-06-17 NOTE — SERVICEHPINOTES
Mister Levine is here for follow-up, she used to see Dr. Shaw.  He has since retired but I did her EGD and banding last year.  She does have a history of varices.  She has cirrhosis due to polycystic liver.  She is currently doing well in terms of her liver related issues but she would benefit from an EGD.  She also is on pantoprazole and wanted to know if she needed to stay on it.  I told her I don't want to take if she doesn't need it so she can stop it and see if symptoms relapse.  She also wanted to know she can eat tomato based products and again I told her its is clear trial and error.  She can try those foods and if it causes GI symptoms she can stop them.  She knows not to eat late or overeat.
johanna spencer She is due for repeat EGD as above.  She needs screening for hepatocellular cancer.  I will check an alpha-fetoprotein.  I'm also going to see if she is a candidate for our cirrhosis study.  If she is a candidate then she can receive an MRI of the liver as part of the study.  I also told her based on current guidelines she needs to drink multiple cups of coffee, this is been shown to reduce the risk of hepatocellular cancer.  She also needs to be vaccinated against hepatitis A and hepatitis B.  I will check titers.
johanna spencer She has no lower GI complaints.  There is no diarrhea, constipation or bleeding.  She does tell me she is now on vitamin D, she is also on an antibiotic for a urinary tract infection.  She looks chronically ill but is in no acute distress.

## 2025-06-19 ENCOUNTER — OFFICE VISIT (OUTPATIENT)
Dept: CARDIOLOGY | Facility: CLINIC | Age: 70
End: 2025-06-19
Payer: COMMERCIAL

## 2025-06-19 VITALS
HEIGHT: 71 IN | WEIGHT: 180.2 LBS | DIASTOLIC BLOOD PRESSURE: 74 MMHG | SYSTOLIC BLOOD PRESSURE: 120 MMHG | BODY MASS INDEX: 25.23 KG/M2 | HEART RATE: 62 BPM

## 2025-06-19 DIAGNOSIS — I47.10 PAROXYSMAL SVT (SUPRAVENTRICULAR TACHYCARDIA): ICD-10-CM

## 2025-06-19 DIAGNOSIS — I10 BENIGN HYPERTENSION: ICD-10-CM

## 2025-06-19 DIAGNOSIS — Z94.0 HISTORY OF KIDNEY TRANSPLANT: ICD-10-CM

## 2025-06-19 DIAGNOSIS — K74.60 CIRRHOSIS OF LIVER WITHOUT ASCITES, UNSPECIFIED HEPATIC CIRRHOSIS TYPE: Primary | ICD-10-CM

## 2025-06-19 PROCEDURE — 93000 ELECTROCARDIOGRAM COMPLETE: CPT | Performed by: STUDENT IN AN ORGANIZED HEALTH CARE EDUCATION/TRAINING PROGRAM

## 2025-06-19 PROCEDURE — 99214 OFFICE O/P EST MOD 30 MIN: CPT | Performed by: STUDENT IN AN ORGANIZED HEALTH CARE EDUCATION/TRAINING PROGRAM

## 2025-06-19 RX ORDER — AMOXICILLIN 500 MG/1
CAPSULE ORAL
COMMUNITY
Start: 2025-06-10

## 2025-06-19 NOTE — PROGRESS NOTES
[facility]    Subjective:     Encounter Date:06/19/25      Patient ID: Jacey Valdez is a 70 y.o. female.    Chief Complaint:   Chief Complaint   Patient presents with    Palpitations   Follow-up palpitations  History of Present Illness    Ms. Valdez is a pleasant 70 y.o. 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  She also recently has been evaluated with CT scans which revealed portal hypertension and cirrhosis, with varices and follows with [facility].    She does have a history of AVNRT, both typical and atypical, and underwent ablation on June 2, 2014 with Dr. Adhikari at [facility], where she underwent a catheter ablation of the slow pathway.    At last visit, she started to have recurrent palpitations, and ambulatory monitoring revealed frequent supraventricular ectopy at 10% with multiple atrial runs, longest episode lasting 12.8 seconds.  She started on diltiazem.  She had some subsequent lower extremity edema and the palpitations were not particularly better or worse.  It was stopped.  It definitely did not help her dyspnea, she follows with Dr. Tay for pneumonitis and bronchiectasis.    She is noticed some worsening swelling of her legs recently.  It started after diltiazem, it has improved have her since discontinuation at last visit.  Nadolol was increased and this has helped her palpitations tremendously.  She presents today for follow-up with no new complaints.  She is tolerating her other medications well.    Previous card testing:  Echocardiogram October 23:    : Left ventricular systolic function is normal. Left ventricular ejection fraction appears to be 66 - 70%. Normal global longitudinal LV strain (GLS) = -21.4%. Left ventricle strain data was reviewed by the physician and found to be accurate.  Normal left ventricular cavity size noted. Left ventricular wall thickness is consistent with mild concentric hypertrophy. All left ventricular wall segments contract normally. Left ventricular diastolic function was normal.    The low ventricular myocardium appears speckled, but this may be due to high gain on the study in general. There is normal left atrial volume, normal diastolic function, and normal global longitudinal strain, which makes infiltration less likely.    Estimated right ventricular systolic pressure from tricuspid regurgitation is normal (<35 mmHg).    Holter monitor October 23:      An abnormal monitor study.    7-day Holter monitor predominant rhythm sinus heart ranging 54 to 154 bpm (average 73 bpm). Frequent supraventricular ectopy (10.1%) with 64 runs of nonsustained SVT the longest of which was 12.8 seconds at 115 bpm.   Rare ventricular ectopy with no NSVT. No atrial fibrillation or flutter was noted. There were 34 patient triggered events and 10 diary events with the predominant symptom being palpitations.  All correlated with isolated supraventricular ectopy and 1 or 2 runs of nonsustained SVT.       Echocardiogram April 2014:  Mild concentric LVH  LVEF 59%  Grade 1 diastolic dysfunction  Mildly enlarged left atrium    Pharmacologic stress test Lexiscan Cardiolite April 2014:  1. Normal IV Lexiscan myocardial perfusion study without evidence of   ischemia or infarction.   2. Normal systolic function with normal ejection fraction of 69%.     Echocardiogram performed December 5, 2024:    Left ventricular systolic function is normal. Calculated left ventricular EF = 62.4% Left ventricular ejection fraction appears to be 61 - 65%.    Left ventricular wall thickness is consistent with moderate concentric hypertrophy.    Left ventricular diastolic function is consistent with (grade I) impaired relaxation.    The right ventricular cavity is borderline dilated.    The left atrial cavity is  "moderate to severely dilated.    Estimated right ventricular systolic pressure from tricuspid regurgitation is normal (<35 mmHg). Calculated right ventricular systolic pressure from tricuspid regurgitation is 25 mmHg.    The aortic root measures 3.5 cm.         The following portions of the patient's history were reviewed and updated as appropriate: allergies, current medications, past family history, past medical history, past social history, past surgical history and problem list.    Past Medical History:   Diagnosis Date    Acute cystitis without hematuria 12/27/2022    Arrhythmia 2022    Asthma 2022    Chronic kidney disease 2001    I had pkd, received kidney in 2005    Kidney transplanted     Right upper quadrant abdominal pain 12/27/2022    Tachycardia     Had ablation       Past Surgical History:   Procedure Laterality Date    ABLATION OF DYSRHYTHMIC FOCUS  2003    Had tachycardia    ENDOSCOPY N/A 12/29/2022    Procedure: ESOPHAGOGASTRODUODENOSCOPY with biopsies;  Surgeon: Kristin Thao MD;  Location: [facility] ENDOSCOPY;  Service: Gastroenterology;  Laterality: N/A;  Pre: abnormal CT scan  Post: esophageal varacies, portal hypertensive gastropathy, gastritis    PARATHYROIDECTOMY      \"All removed but 1/2 of one\"    TRANSPLANTATION RENAL             ECG 12 Lead    Date/Time: 6/19/2025 12:09 PM  Performed by: Bret Cason MD    Authorized by: Bret Cason MD  Comparison: compared with previous ECG from 12/26/2023  Similar to previous ECG  Rhythm: sinus rhythm  Rate: normal  Conduction: 1st degree AV block  ST Segments: ST segments normal  T Waves: T waves normal  T elevation: II, III, aVF, V5, V6 and V4  QRS axis: normal  Other: no other findings  Other findings: early repolarization    Clinical impression: normal ECG  Comments: First-degree AV block secondary to nadolol otherwise normal EKG.  Early repolarization findings unchanged compared to prior EKG.             Objective:     Vitals:    06/19/25 " "1124   BP: 120/74   Pulse: 62   Weight: 81.7 kg (180 lb 3.2 oz)   Height: 180.3 cm (71\")         Constitutional:       Appearance: Healthy appearance. Not in distress.   Neck:      Vascular: JVD normal.   Pulmonary:      Effort: Pulmonary effort is normal.      Breath sounds: Normal breath sounds.   Cardiovascular:      PMI at left midclavicular line. Normal rate. Regular rhythm. Normal S2.       Murmurs: There is a grade 2/6 mid frequency blowing holosystolic murmur at the LLSB.   Pulses:     Intact distal pulses.   Edema:     Peripheral edema present.     Pretibial: bilateral trace edema of the pretibial area.     Ankle: bilateral trace edema of the ankle.  Musculoskeletal:      Comments: Clubbing noted to right upper extremity only, right upper extremity AV fistula noted. Skin:     General: Skin is warm and dry.   Neurological:      General: No focal deficit present.      Mental Status: Alert, oriented to person, place, and time and oriented to person, place and time.   Psychiatric:         Mood and Affect: Mood and affect normal.         Lab Review:     Lipid Panel          9/18/2024    09:30 12/9/2024    08:53   Lipid Panel   Total Cholesterol 127     147       Triglycerides 61     73       HDL Cholesterol 61     59       LDL Cholesterol  53     74          Details          This result is from an external source.             BUN   Date Value Ref Range Status   12/31/2022 21 8 - 23 mg/dL Final   06/24/2022 14 10 - 20 mg/dL Final     Creatinine   Date Value Ref Range Status   03/30/2023 1.00 0.60 - 1.30 mg/dL Final     Comment:     Serial Number: [id number]:  [id number]   06/24/2022 0.77 0.55 - 1.02 mg/dL Final     Potassium   Date Value Ref Range Status   12/31/2022 3.9 3.5 - 5.2 mmol/L Final   06/24/2022 4.2 3.5 - 5.1 mmol/L Final     ALT (SGPT)   Date Value Ref Range Status   12/31/2022 19 1 - 33 U/L Final   06/24/2022 18 0 - 55 U/L Final     AST (SGOT)   Date Value Ref Range Status   12/31/2022 27 1 - 32 U/L " Final   06/24/2022 26 5 - 34 U/L Final         Performed        Assessment:          Diagnosis Plan   1. Cirrhosis of liver without ascites, unspecified hepatic cirrhosis type        2. Paroxysmal SVT (supraventricular tachycardia)        3. History of kidney transplant        4. Benign hypertension                     Plan:         Dyspnea on exertion: Suggestive of atypical pneumonitis and she follows with pulmonary.  She is on bronchodilators.    Echocardiogram was unremarkable for CHF, and there is no evidence of pulm hypertension.     Repeat echo was performed in 2024, GLS is normal.  There is a mildly speckled appearance to the myocardium but no definitive features to suggest amyloid.  Will follow her clinically.  May need PYP scan versus amyloid workup if symptoms worsen  Palpitations, history of AVNRT status post ablation: Some nonsustained SVT episodes.  She was started on diltiazem, however she did not feel any better, and leg swellings worsen.  This was discontinued and replaced with nadolol which is working well.    Continue nadolol 40 daily.    Low threshold for EP evaluation should palpitations recur or worsen    Cirrhosis: Follows with GI.   She has grade 2 esophageal varices.  Remains on nadolol  History of renal transplant.  She follows with Dr. Estrada.   Bronhiectasis and pneumonitis. Follows with Dr. Tay   Right upper extremity clubbing.  This is the same arm as her AV fistula.  I think it would be unusual for her to have pulmonary hypertension and it affect only 1 upper extremity which makes me think this is more related to the venous process secondary to the AV fistula in that arm rather than a primary pulmonary or pulmonary hypertension process.     RTC 6 months.    Bret Cason MD  [facility]  06/19/25  12:53 EDT       Current Outpatient Medications:     Acetaminophen (TYLENOL PO), Take 1 tablet by mouth As Needed., Disp: , Rfl:     albuterol (PROVENTIL) (2.5 MG/3ML)  0.083% nebulizer solution, , Disp: , Rfl:     amoxicillin (AMOXIL) 500 MG capsule, , Disp: , Rfl:     Cholecalciferol (Vitamin D3) 25 MCG capsule, Take 2,000 Units by mouth., Disp: , Rfl:     guaiFENesin (MUCINEX) 600 MG 12 hr tablet, Take 2 tablets by mouth 2 (Two) Times a Day., Disp: , Rfl:     multivitamin with minerals tablet tablet, Take 1 tablet by mouth Daily., Disp: , Rfl:     mycophenolate (CELLCEPT) 250 MG capsule, Take 1 capsule by mouth Every Morning., Disp: , Rfl:     nadolol (CORGARD) 20 MG tablet, Take 2 tablets by mouth Daily., Disp: , Rfl:     pantoprazole (PROTONIX) 40 MG EC tablet, Take 1 tablet by mouth Daily., Disp: , Rfl:     predniSONE (DELTASONE) 5 MG tablet, Take 1 tablet by mouth Daily., Disp: , Rfl:     sodium chloride 7 % nebulizer solution nebulizer solution, , Disp: , Rfl:     tacrolimus (PROGRAF) 1 MG capsule, Take 1 capsule by mouth 2 (Two) Times a Day., Disp: , Rfl:     Magnesium 200 MG tablet, Take 1 tablet by mouth Daily., Disp: , Rfl:     multivitamin (MULTIPLE VITAMIN PO), Apply 1 tablet to the mouth or throat Daily., Disp: , Rfl:          Return in about 6 months (around 12/19/2025).      Part of this note may be an electronic transcription/translation of spoken language to printed text using the Dragon Dictation System.

## 2025-07-21 ENCOUNTER — ON CAMPUS - OUTPATIENT (OUTPATIENT)
Dept: URBAN - METROPOLITAN AREA HOSPITAL 108 | Facility: HOSPITAL | Age: 70
End: 2025-07-21
Payer: COMMERCIAL

## 2025-07-21 DIAGNOSIS — K31.7 POLYP OF STOMACH AND DUODENUM: ICD-10-CM

## 2025-07-21 DIAGNOSIS — K22.89 OTHER SPECIFIED DISEASE OF ESOPHAGUS: ICD-10-CM

## 2025-07-21 DIAGNOSIS — I85.00 ESOPHAGEAL VARICES WITHOUT BLEEDING: ICD-10-CM

## 2025-07-21 DIAGNOSIS — K44.9 DIAPHRAGMATIC HERNIA WITHOUT OBSTRUCTION OR GANGRENE: ICD-10-CM

## 2025-07-21 PROCEDURE — 43235 EGD DIAGNOSTIC BRUSH WASH: CPT | Performed by: INTERNAL MEDICINE

## (undated) DEVICE — BITEBLOCK OMNI BLOC

## (undated) DEVICE — SENSR O2 OXIMAX FNGR A/ 18IN NONSTR

## (undated) DEVICE — ADAPT CLN BIOGUARD AIR/H2O DISP

## (undated) DEVICE — MSK PROC CURAPLEX O2 2/ADAPT 7FT

## (undated) DEVICE — KT ORCA ORCAPOD DISP STRL

## (undated) DEVICE — TUBING, SUCTION, 1/4" X 10', STRAIGHT: Brand: MEDLINE

## (undated) DEVICE — FRCP BX RADJAW4 NDL 2.8 240CM LG OG BX40

## (undated) DEVICE — LN SMPL CO2 SHTRM SD STREAM W/M LUER